# Patient Record
Sex: FEMALE | Race: WHITE | NOT HISPANIC OR LATINO | Employment: OTHER | URBAN - METROPOLITAN AREA
[De-identification: names, ages, dates, MRNs, and addresses within clinical notes are randomized per-mention and may not be internally consistent; named-entity substitution may affect disease eponyms.]

---

## 2017-07-28 ENCOUNTER — HOSPITAL ENCOUNTER (OUTPATIENT)
Dept: LAB | Facility: HOSPITAL | Age: 67
Discharge: HOME | End: 2017-07-28
Attending: PSYCHIATRY & NEUROLOGY | Admitting: PSYCHIATRY & NEUROLOGY

## 2017-07-28 LAB
ALBUMIN SERPL-MCNC: 3.9 G/DL (ref 3.5–4.8)
ALBUMIN/GLOB SERPL: 1.1 {RATIO} (ref 1–1.7)
ALP SERPL-CCNC: 87 IU/L (ref 32–91)
ALT SERPL-CCNC: 49 IU/L (ref 14–54)
AMPICILLIN SUSC ISLT: NORMAL
ANION GAP SERPL CALC-SCNC: 19.1 MMOL/L (ref 10–20)
AST SERPL-CCNC: 32 IU/L (ref 15–41)
AZTREONAM SUSC ISLT: NORMAL
BACTERIA ISLT: NORMAL
BACTERIA SPEC AEROBE CULT: NORMAL
BASOPHILS # BLD AUTO: 0 10*3/UL (ref 0–0.2)
BASOPHILS NFR BLD AUTO: 0 % (ref 0–2)
BILIRUB SERPL-MCNC: 0.6 MG/DL (ref 0.3–1.2)
BILIRUB UR QL STRIP: NEGATIVE MG/DL
BUN SERPL-MCNC: 13 MG/DL (ref 8–20)
BUN/CREAT SERPL: 14.4 (ref 5.4–26.2)
CALCIUM SERPL-MCNC: 9.8 MG/DL (ref 8.9–10.3)
CASTS URNS QL MICRO: ABNORMAL /[LPF]
CEFAZOLIN SUSC ISLT: NORMAL
CEFEPIME SUSC ISLT: NORMAL
CEFTRIAXONE SUSC ISLT: NORMAL
CHLORIDE SERPL-SCNC: 95 MMOL/L (ref 101–111)
CHOLEST SERPL-MCNC: 161 MG/DL
CHOLEST/HDLC SERPL: 3.8 {RATIO}
CIPROFLOXACIN SUSC ISLT: NORMAL
COLONY COUNT: NORMAL
COLOR UR: YELLOW
CONV BACTERIA IN URINE MICRO: ABNORMAL
CONV CLARITY OF URINE: ABNORMAL
CONV CO2: 27 MMOL/L (ref 22–32)
CONV HYALINE CASTS IN URINE MICRO: ABNORMAL /[LPF] (ref 0–5)
CONV LDL CHOLESTEROL DIRECT: 77 MG/DL (ref 0–100)
CONV MICROALBUM.,U,RANDOM: 303 MG/L
CONV PROTEIN IN URINE BY AUTOMATED TEST STRIP: 30 MG/DL
CONV SMALL ROUND CELLS: ABNORMAL /[HPF]
CONV TOTAL PROTEIN: 7.5 G/DL (ref 6.1–7.9)
CONV UROBILINOGEN IN URINE BY AUTOMATED TEST STRIP: 0.2 MG/DL
CREAT 24H UR-MCNC: 46.7 MG/DL
CREAT UR-MCNC: 0.9 MG/DL (ref 0.4–1)
CULTURE INDICATED?: ABNORMAL
DIFFERENTIAL METHOD BLD: (no result)
EOSINOPHIL # BLD AUTO: 0 % (ref 0–3)
EOSINOPHIL # BLD AUTO: 0 10*3/UL (ref 0–0.3)
ERTAPENEM SUSC ISLT: NORMAL
ERYTHROCYTE [DISTWIDTH] IN BLOOD BY AUTOMATED COUNT: 13.6 % (ref 11.5–14.5)
GLOBULIN UR ELPH-MCNC: 3.6 G/DL (ref 2.5–3.8)
GLUCOSE SERPL-MCNC: 127 MG/DL (ref 65–99)
GLUCOSE UR QL: NEGATIVE MG/DL
HCT VFR BLD AUTO: 42.6 % (ref 35–49)
HDLC SERPL-MCNC: 43 MG/DL
HGB BLD-MCNC: 14.6 G/DL (ref 12–15)
HGB UR QL STRIP: ABNORMAL
KETONES UR QL STRIP: NEGATIVE MG/DL
LDLC/HDLC SERPL: 1.8 {RATIO}
LEUKOCYTE ESTERASE UR QL STRIP: ABNORMAL
LEVOFLOXACIN SUSC ISLT: NORMAL
LIPID INTERPRETATION: ABNORMAL
LYMPHOCYTES # BLD AUTO: 1.5 10*3/UL (ref 0.8–4.8)
LYMPHOCYTES NFR BLD AUTO: 18 % (ref 18–42)
Lab: NORMAL
MCH RBC QN AUTO: 33.1 PG (ref 26–32)
MCHC RBC AUTO-ENTMCNC: 34.1 G/DL (ref 32–36)
MCV RBC AUTO: 97.1 FL (ref 80–94)
MEROPENEM SUSC ISLT: NORMAL
MICRO REPORT STATUS: NORMAL
MICROALBUMIN/CREAT UR: 648.8 UG/MG
MONOCYTES # BLD AUTO: 0.7 10*3/UL (ref 0.1–1.3)
MONOCYTES NFR BLD AUTO: 8 % (ref 2–11)
NEUTROPHILS # BLD AUTO: 6.2 10*3/UL (ref 2.3–8.6)
NEUTROPHILS NFR BLD AUTO: 74 % (ref 50–75)
NITRITE UR QL STRIP: NEGATIVE
NITROFURANTOIN SUSC ISLT: NORMAL
NRBC BLD AUTO-RTO: 0 /100{WBCS}
NRBC/RBC NFR BLD MANUAL: 0 10*3/UL
PH UR STRIP.AUTO: 7 [PH] (ref 4.5–8)
PIP+TAZO SUSC ISLT: NORMAL
PLATELET # BLD AUTO: 213 10*3/UL (ref 150–450)
PMV BLD AUTO: 8.5 FL (ref 7.4–10.4)
POTASSIUM SERPL-SCNC: 4.1 MMOL/L (ref 3.6–5.1)
RBC # BLD AUTO: 4.39 10*6/UL (ref 4–5.4)
RBC #/AREA URNS HPF: 3 /[HPF] (ref 0–3)
SODIUM SERPL-SCNC: 137 MMOL/L (ref 136–144)
SP GR UR: 1.01 (ref 1–1.03)
SPECIMEN SOURCE: NORMAL
SPERM URNS QL MICRO: ABNORMAL /[HPF]
SQUAMOUS SPT QL MICRO: 5 /[HPF] (ref 0–5)
SUSC METH SPEC: NORMAL
T3FREE SERPL-MCNC: 3.62 PG/ML (ref 2.39–6.79)
T4 FREE SERPL-MCNC: 1.06 NG/DL (ref 0.58–1.64)
TETRACYCLINE SUSC ISLT: NORMAL
TOBRAMYCIN SUSC ISLT: NORMAL
TRIGL SERPL-MCNC: 226 MG/DL
TRIMETHOPRIM/SULFA: NORMAL
TSH SERPL-ACNC: 1.45 UIU/ML (ref 0.34–5.6)
UNIDENT CRYS URNS QL MICRO: ABNORMAL /[HPF]
VIT B12 SERPL-MCNC: 1411 PG/ML (ref 180–914)
VLDLC SERPL CALC-MCNC: 41 MG/DL
WBC # BLD AUTO: 8.4 10*3/UL (ref 4.5–11.5)
WBC #/AREA URNS HPF: ABNORMAL /[HPF] (ref 0–5)
YEAST SPEC QL WET PREP: ABNORMAL /[HPF]

## 2017-08-09 ENCOUNTER — HOSPITAL ENCOUNTER (OUTPATIENT)
Dept: MAMMOGRAPHY | Facility: HOSPITAL | Age: 67
Discharge: HOME | End: 2017-08-09
Attending: PSYCHIATRY & NEUROLOGY | Admitting: PSYCHIATRY & NEUROLOGY

## 2017-10-06 ENCOUNTER — INPATIENT HOSPITAL (OUTPATIENT)
Dept: URBAN - METROPOLITAN AREA HOSPITAL 84 | Facility: HOSPITAL | Age: 67
End: 2017-10-06
Payer: MEDICAID

## 2017-10-06 DIAGNOSIS — R94.5 ABNORMAL RESULTS OF LIVER FUNCTION STUDIES: ICD-10-CM

## 2017-10-06 PROCEDURE — 99221 1ST HOSP IP/OBS SF/LOW 40: CPT | Performed by: NURSE PRACTITIONER

## 2017-10-30 ENCOUNTER — LAB REQUISITION (OUTPATIENT)
Dept: LAB | Facility: HOSPITAL | Age: 67
End: 2017-10-30

## 2017-10-30 DIAGNOSIS — Z00.00 ROUTINE GENERAL MEDICAL EXAMINATION AT A HEALTH CARE FACILITY: ICD-10-CM

## 2017-10-30 LAB
INR PPP: 2.06 (ref 0.9–1.1)
PROTHROMBIN TIME: 22.5 SECONDS (ref 11.7–14.2)

## 2017-10-30 PROCEDURE — 85610 PROTHROMBIN TIME: CPT

## 2018-05-22 ENCOUNTER — HOSPITAL ENCOUNTER (OUTPATIENT)
Dept: LAB | Facility: HOSPITAL | Age: 68
Discharge: HOME | End: 2018-05-22
Attending: INTERNAL MEDICINE | Admitting: INTERNAL MEDICINE

## 2018-05-22 LAB
ANION GAP SERPL CALC-SCNC: 16.7 MMOL/L (ref 10–20)
BILIRUB UR QL STRIP: NEGATIVE MG/DL
BUN SERPL-MCNC: 18 MG/DL (ref 8–20)
BUN/CREAT SERPL: 22.5 (ref 5.4–26.2)
CALCIUM SERPL-MCNC: 9.5 MG/DL (ref 8.9–10.3)
CASTS URNS QL MICRO: ABNORMAL /[LPF]
CHLORIDE SERPL-SCNC: 98 MMOL/L (ref 101–111)
COLOR UR: YELLOW
CONV BACTERIA IN URINE MICRO: NEGATIVE
CONV CLARITY OF URINE: CLEAR
CONV CO2: 26 MMOL/L (ref 22–32)
CONV HYALINE CASTS IN URINE MICRO: 0 /[LPF] (ref 0–5)
CONV PROTEIN IN URINE BY AUTOMATED TEST STRIP: 30 MG/DL
CONV SMALL ROUND CELLS: ABNORMAL /[HPF]
CONV UROBILINOGEN IN URINE BY AUTOMATED TEST STRIP: 0.2 MG/DL
CREAT 24H UR-MCNC: 18.5 MG/DL
CREAT UR-MCNC: 0.8 MG/DL (ref 0.4–1)
CULTURE INDICATED?: ABNORMAL
GLUCOSE SERPL-MCNC: 101 MG/DL (ref 65–99)
GLUCOSE UR QL: NEGATIVE MG/DL
HGB UR QL STRIP: NEGATIVE
KETONES UR QL STRIP: NEGATIVE MG/DL
LEUKOCYTE ESTERASE UR QL STRIP: ABNORMAL
NITRITE UR QL STRIP: NEGATIVE
PH UR STRIP.AUTO: 7.5 [PH] (ref 4.5–8)
POTASSIUM SERPL-SCNC: 4.7 MMOL/L (ref 3.6–5.1)
PROT UR-MCNC: 31 MG/DL
PROT/CREAT UR: 1.7 MG/MG (ref 0–22)
RBC #/AREA URNS HPF: 0 /[HPF] (ref 0–3)
SODIUM SERPL-SCNC: 136 MMOL/L (ref 136–144)
SP GR UR: 1.01 (ref 1–1.03)
SPERM URNS QL MICRO: ABNORMAL /[HPF]
SQUAMOUS SPT QL MICRO: 1 /[HPF] (ref 0–5)
UNIDENT CRYS URNS QL MICRO: ABNORMAL /[HPF]
WBC #/AREA URNS HPF: 2 /[HPF] (ref 0–5)
YEAST SPEC QL WET PREP: ABNORMAL /[HPF]

## 2019-02-05 ENCOUNTER — LAB REQUISITION (OUTPATIENT)
Dept: LAB | Facility: HOSPITAL | Age: 69
End: 2019-02-05

## 2019-02-05 DIAGNOSIS — Z00.00 ROUTINE GENERAL MEDICAL EXAMINATION AT A HEALTH CARE FACILITY: ICD-10-CM

## 2019-02-05 LAB
INR PPP: 1.6 (ref 0.9–1.1)
PROTHROMBIN TIME: 18.7 SECONDS (ref 11.7–14.2)

## 2019-02-05 PROCEDURE — 85610 PROTHROMBIN TIME: CPT

## 2019-04-05 ENCOUNTER — LAB REQUISITION (OUTPATIENT)
Dept: LAB | Facility: HOSPITAL | Age: 69
End: 2019-04-05

## 2019-04-05 DIAGNOSIS — Z00.00 ROUTINE GENERAL MEDICAL EXAMINATION AT A HEALTH CARE FACILITY: ICD-10-CM

## 2019-04-05 LAB
INR PPP: 1.91 (ref 0.9–1.1)
PROTHROMBIN TIME: 21.5 SECONDS (ref 11.7–14.2)

## 2019-04-05 PROCEDURE — 85610 PROTHROMBIN TIME: CPT

## 2019-07-24 ENCOUNTER — OFFICE VISIT (OUTPATIENT)
Dept: NEUROLOGY | Facility: CLINIC | Age: 69
End: 2019-07-24

## 2019-07-24 VITALS
HEIGHT: 65 IN | WEIGHT: 207 LBS | DIASTOLIC BLOOD PRESSURE: 71 MMHG | SYSTOLIC BLOOD PRESSURE: 183 MMHG | HEART RATE: 78 BPM | BODY MASS INDEX: 34.49 KG/M2

## 2019-07-24 DIAGNOSIS — G81.90 HEMIPLEGIA OF DOMINANT SIDE (HCC): ICD-10-CM

## 2019-07-24 DIAGNOSIS — G40.909 SEIZURE DISORDER (HCC): ICD-10-CM

## 2019-07-24 DIAGNOSIS — G47.33 OBSTRUCTIVE SLEEP APNEA: Primary | ICD-10-CM

## 2019-07-24 PROBLEM — I48.91 ATRIAL FIBRILLATION (HCC): Status: ACTIVE | Noted: 2019-04-16

## 2019-07-24 PROBLEM — Z80.1 FAMILY HISTORY OF LUNG CANCER: Status: ACTIVE | Noted: 2019-07-24

## 2019-07-24 PROBLEM — I63.9 CEREBRAL INFARCTION (HCC): Status: ACTIVE | Noted: 2019-04-16

## 2019-07-24 PROBLEM — I10 HYPERTENSIVE DISORDER: Status: ACTIVE | Noted: 2019-04-16

## 2019-07-24 PROBLEM — E78.5 HYPERLIPIDEMIA: Status: ACTIVE | Noted: 2019-04-16

## 2019-07-24 PROBLEM — G47.30 SLEEP APNEA: Status: ACTIVE | Noted: 2019-07-24

## 2019-07-24 PROCEDURE — 99214 OFFICE O/P EST MOD 30 MIN: CPT | Performed by: PSYCHIATRY & NEUROLOGY

## 2019-07-24 RX ORDER — HYDRALAZINE HYDROCHLORIDE 50 MG/1
50 TABLET, FILM COATED ORAL 3 TIMES DAILY
COMMUNITY

## 2019-07-24 RX ORDER — BACLOFEN 10 MG/1
15 TABLET ORAL 3 TIMES DAILY
COMMUNITY

## 2019-07-24 RX ORDER — WARFARIN SODIUM 4 MG/1
TABLET ORAL
COMMUNITY
End: 2020-11-17

## 2019-07-24 RX ORDER — AMLODIPINE BESYLATE 10 MG/1
10 TABLET ORAL NIGHTLY
COMMUNITY
Start: 2015-05-07

## 2019-07-24 RX ORDER — PHENYTOIN SODIUM 100 MG/1
CAPSULE, EXTENDED RELEASE ORAL
COMMUNITY
Start: 2015-05-07 | End: 2020-11-17

## 2019-07-24 RX ORDER — METOPROLOL TARTRATE 50 MG/1
TABLET, FILM COATED ORAL
COMMUNITY
Start: 2015-05-07 | End: 2020-11-17

## 2019-07-24 RX ORDER — WARFARIN SODIUM 5 MG/1
TABLET ORAL
COMMUNITY
End: 2020-11-17

## 2019-07-24 RX ORDER — POLYETHYLENE GLYCOL 3350
17 GRANULES (GRAM) MISCELLANEOUS 2 TIMES DAILY
COMMUNITY
End: 2020-11-17

## 2019-07-24 RX ORDER — LISINOPRIL 40 MG/1
40 TABLET ORAL DAILY
COMMUNITY
Start: 2016-07-21

## 2019-07-24 RX ORDER — DIGOXIN 250 MCG
250 TABLET ORAL
COMMUNITY

## 2019-07-24 RX ORDER — ATORVASTATIN CALCIUM 10 MG/1
10 TABLET, FILM COATED ORAL NIGHTLY
COMMUNITY
Start: 2016-07-21

## 2019-07-24 RX ORDER — LEVETIRACETAM 500 MG/1
500 TABLET ORAL 2 TIMES DAILY
COMMUNITY
Start: 2015-05-07

## 2019-07-24 NOTE — PROGRESS NOTES
Sleep medicine follow-up visit    Jeremiah Henson   1950  68 y.o. female   DATE OF SERVICE: 7/24/2019     SLEEP TESTING HISTORY:    On NPSG at [facility] , 9/4/2009 patient had Moderate obstructive sleep apnea syndrome with apnea-hypopnea index of 9.5 per sleep hour,   Yearly f/u for bipap compliance, patient doing well with pap therapy, and uses nasal mask and goes through [employer] for supplies.     The compliance data reviewed and the patient is on BiPAP therapy at 12-24-4-8 cm/H2O and compliance data indicates excellent compliance with 99% usage for more than 4 hours with an average usage of 9 hours 35 minutes. AHI down to 4.3    The patient's hypersomnia also resolved with [place] Sleepiness Scale score of 0 with CPAP therapy.  The patient feels great and is benefiting from it and is compliant.     Seizure disorder, no seizures doing well with medication.    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 Patient currently uses a wheel chair currently taking coumadin. Patient currently lives at [facility] and all medication refill go to the nursing home.       Review of Systems   Constitutional: Negative for appetite change and fatigue.   HENT: Negative for congestion, sinus pressure and sinus pain.    Eyes: Negative for pain and itching.   Respiratory: Negative for cough and shortness of breath.    Cardiovascular: Negative for chest pain and palpitations.   Gastrointestinal: Negative for constipation and diarrhea.   Endocrine: Negative for cold intolerance and heat intolerance.   Genitourinary: Negative for difficulty urinating and frequency.   Musculoskeletal: Positive for gait problem. Negative for back pain.   Allergic/Immunologic: Negative for environmental allergies.   Neurological: Negative for dizziness, tremors, seizures, syncope, facial asymmetry, speech difficulty, weakness, light-headedness, numbness and headaches.   Psychiatric/Behavioral:  Negative for agitation and confusion.     I reviewed and addressed ROS entered by MA.      Current Outpatient Medications:   •  amLODIPine (NORVASC) 10 MG tablet, amlodipine 10 mg tablet, Disp: , Rfl:   •  levETIRAcetam (KEPPRA) 500 MG tablet, levetiracetam 500 mg tablet, Disp: , Rfl:   •  lisinopril (PRINIVIL,ZESTRIL) 40 MG tablet, lisinopril 40 mg tablet, Disp: , Rfl:   •  metoprolol tartrate (LOPRESSOR) 50 MG tablet, METOPROLOL TARTRATE TABS, Disp: , Rfl:   •  phenytoin (DILANTIN) 100 MG ER capsule, Dilantin Extended 100 mg capsule, Disp: , Rfl:   •  baclofen (LIORESAL) 10 MG tablet, baclofen 10 mg tablet, Disp: , Rfl:   •  digoxin (DIGOX) 250 MCG tablet, Digox 250 mcg tablet, Disp: , Rfl:   •  hydrALAZINE (APRESOLINE) 50 MG tablet, hydralazine 50 mg tablet, Disp: , Rfl:   •  warfarin (COUMADIN) 4 MG tablet, warfarin 4 mg tablet, Disp: , Rfl:   •  warfarin (COUMADIN) 5 MG tablet, warfarin 5 mg tablet, Disp: , Rfl:   Allergies not on file     PHYSICAL EXAMINATION:  Vitals:    07/24/19 1528   BP: (!) 183/71   Pulse: 78      Body mass index is 34.45 kg/m².       HEENT: Normal.      EXTREMITIES: No edema.     IMPRESSION: Patient with obstructive sleep apnea syndrome with hypersomnia successfully treated with CPAP therapy and is compliant and benefiting from it. Compliance 99%    No seizures on medication , dilantin, and keppra, continue currentl dose    Stoke no further stoke or coumadin      EPWORTH SLEEPINESS SCALE  Sitting and reading  0  WatchingTV  0  Sitting, inactive, in a public place  0  As a passenger in a car for 1 hour w/o a break  0  Lying down to rest in the afternoon  0  Sitting and talking to someone  0  Sitting quietly after a lunch  0  In a car, while stopped for traffic or a light  0  Total 0          This document has been electronically signed by Joseph Seipel, MD on July 24, 2019 4:06 PM

## 2019-07-25 ENCOUNTER — TELEPHONE (OUTPATIENT)
Dept: NEUROLOGY | Facility: CLINIC | Age: 69
End: 2019-07-25

## 2019-07-25 DIAGNOSIS — G47.33 OBSTRUCTIVE SLEEP APNEA: Primary | ICD-10-CM

## 2020-04-04 ENCOUNTER — LAB REQUISITION (OUTPATIENT)
Dept: LAB | Facility: HOSPITAL | Age: 70
End: 2020-04-04

## 2020-04-04 DIAGNOSIS — Z00.00 ROUTINE GENERAL MEDICAL EXAMINATION AT A HEALTH CARE FACILITY: ICD-10-CM

## 2020-04-04 LAB
INR PPP: 2.65 (ref 0.9–1.1)
PROTHROMBIN TIME: 28 SECONDS (ref 11.7–14.2)

## 2020-04-04 PROCEDURE — 85610 PROTHROMBIN TIME: CPT

## 2020-07-29 ENCOUNTER — OFFICE VISIT (OUTPATIENT)
Dept: NEUROLOGY | Facility: CLINIC | Age: 70
End: 2020-07-29

## 2020-07-29 VITALS
BODY MASS INDEX: 34.49 KG/M2 | SYSTOLIC BLOOD PRESSURE: 164 MMHG | HEART RATE: 59 BPM | WEIGHT: 207 LBS | HEIGHT: 65 IN | DIASTOLIC BLOOD PRESSURE: 78 MMHG | TEMPERATURE: 97.7 F

## 2020-07-29 DIAGNOSIS — G47.33 OBSTRUCTIVE SLEEP APNEA SYNDROME: Primary | ICD-10-CM

## 2020-07-29 DIAGNOSIS — G40.909 SEIZURE DISORDER (HCC): ICD-10-CM

## 2020-07-29 DIAGNOSIS — G81.90 HEMIPLEGIA OF DOMINANT SIDE (HCC): ICD-10-CM

## 2020-07-29 PROBLEM — S62.91XA CLOSED FRACTURE OF RIGHT HAND: Status: ACTIVE | Noted: 2020-02-03

## 2020-07-29 PROBLEM — G89.4 CHRONIC PAIN DISORDER: Status: ACTIVE | Noted: 2019-09-21

## 2020-07-29 PROBLEM — E55.9 VITAMIN D DEFICIENCY: Status: ACTIVE | Noted: 2020-03-25

## 2020-07-29 PROBLEM — J30.2 SEASONAL ALLERGIC RHINITIS: Status: ACTIVE | Noted: 2020-02-13

## 2020-07-29 PROBLEM — N39.0 URINARY TRACT INFECTIOUS DISEASE: Status: ACTIVE | Noted: 2020-04-08

## 2020-07-29 PROBLEM — L02.212 ABSCESS OF BACK: Status: ACTIVE | Noted: 2020-04-20

## 2020-07-29 PROCEDURE — 99213 OFFICE O/P EST LOW 20 MIN: CPT | Performed by: PSYCHIATRY & NEUROLOGY

## 2020-07-29 RX ORDER — DIVALPROEX SODIUM 125 MG/1
250 TABLET, DELAYED RELEASE ORAL 3 TIMES DAILY
COMMUNITY

## 2020-07-29 NOTE — PROGRESS NOTES
Sleep medicine follow-up visit    Jeremiah Henson   1950  69 y.o. female   DATE OF SERVICE: 7/29/2020     Yearly f/u for bipap compliance, patient doing well with pap therapy, and uses nasal mask and goes through [employer] for supplies.     SLEEP TESTING HISTORY:    On NPSG at [facility] , 9/4/2009 patient had Moderate obstructive sleep apnea syndrome with apnea-hypopnea index of 9.5 per sleep hour,     The compliance data reviewed and the patient is on PAP therapy at 12-24 / 4-8 cm/H2O and compliance data indicates excellent compliance with 96% usage for more than 4 hours with an average usage of 8 hours 49 minutes. AHI down to 12 but leak about 1.5 hour per day.      The patient's hypersomnia also resolved with [place] Sleepiness Scale score of 2 with CPAP therapy.  The patient feels great and is benefiting from it and is compliant.     Seizure disorder, no seizures doing well with medication.dilantin, and keppra.      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     Patient currently uses a wheel chair on coumadin and lives at [facility]. Would like all medication refills to go to the nursing home.       Review of Systems   Constitutional: Negative for appetite change and fatigue.   HENT: Negative for congestion, sinus pressure and sinus pain.    Eyes: Negative for pain and itching.   Respiratory: Positive for apnea. Negative for cough and shortness of breath.    Cardiovascular: Negative for chest pain and palpitations.   Gastrointestinal: Negative for constipation and diarrhea.   Endocrine: Positive for cold intolerance. Negative for heat intolerance.   Genitourinary: Negative for difficulty urinating and frequency.   Musculoskeletal: Positive for back pain and gait problem.   Allergic/Immunologic: Negative for environmental allergies.   Neurological: Negative for dizziness, tremors, seizures, syncope, facial asymmetry, speech difficulty, weakness,  light-headedness, numbness and headaches.   Psychiatric/Behavioral: Negative for agitation and confusion.     I reviewed and addressed ROS entered by MA.    History of Present Illness, BRIE, Seizure disorder, Hemiplegia and Hemiparesis, late effect of stroke right dominant side.     The following portions of the patient's history were reviewed and updated as appropriate: allergies, current medications, past family history, past medical history, past social history, past surgical history and problem list.      Family History   Problem Relation Age of Onset   • Lung cancer Father    • Heart attack Father        Past Medical History:   Diagnosis Date   • CVA (cerebral vascular accident) (CMS/HCC)    • Hypertension    • Seizure (CMS/HCC)    • Sleep apnea        Social History     Socioeconomic History   • Marital status:      Spouse name: Not on file   • Number of children: Not on file   • Years of education: Not on file   • Highest education level: Not on file   Tobacco Use   • Smoking status: Former Smoker     Years: 20.00   • Smokeless tobacco: Never Used   Substance and Sexual Activity   • Alcohol use: No     Frequency: Never   • Drug use: No   • Sexual activity: Defer         Current Outpatient Medications:   •  amLODIPine (NORVASC) 10 MG tablet, amlodipine 10 mg tablet, Disp: , Rfl:   •  atorvastatin (LIPITOR) 10 MG tablet, ATORVASTATIN CALCIUM 40 MG ORAL TABS (ATORVASTATIN CALCIUM), Disp: , Rfl:   •  baclofen (LIORESAL) 10 MG tablet, baclofen 10 mg tablet, Disp: , Rfl:   •  calcium carbonate-vitamin d (CALCIUM 600+D HIGH POTENCY) 600-400 MG-UNIT per tablet, CALCIUM 400, Disp: , Rfl:   •  digoxin (DIGOX) 250 MCG tablet, Digox 250 mcg tablet, Disp: , Rfl:   •  hydrALAZINE (APRESOLINE) 50 MG tablet, hydralazine 50 mg tablet, Disp: , Rfl:   •  levETIRAcetam (KEPPRA) 500 MG tablet, levetiracetam 500 mg tablet, Disp: , Rfl:   •  lisinopril (PRINIVIL,ZESTRIL) 40 MG tablet, lisinopril 40 mg tablet, Disp: , Rfl:   •   metoprolol tartrate (LOPRESSOR) 50 MG tablet, METOPROLOL TARTRATE TABS, Disp: , Rfl:   •  phenytoin (DILANTIN) 100 MG ER capsule, Dilantin Extended 100 mg capsule, Disp: , Rfl:   •  Polyethylene Glycol 3350 granules, polyethylene glycol 3350 17 gram/dose oral powder, Disp: , Rfl:   •  warfarin (COUMADIN) 4 MG tablet, warfarin 4 mg tablet, Disp: , Rfl:   •  warfarin (COUMADIN) 5 MG tablet, warfarin 5 mg tablet, Disp: , Rfl:     Allergies not on file     PHYSICAL EXAMINATION:    There were no vitals filed for this visit.   There is no height or weight on file to calculate BMI.       HEENT: Normal.      EXTREMITIES: No edema.     IMPRESSION:    Patient with obstructive sleep apnea syndrome with hypersomnia successfully treated with   PAP therapy and is compliant and benefiting from it.       RECOMMENDATIONS:   1. Continue present PAP.   2. Follow up 1 year.   3 continue Keppra and dilantin    EPWORTH SLEEPINESS SCALE  Sitting and reading  0  WatchingTV  2  Sitting, inactive, in a public place  0  As a passenger in a car for 1 hour w/o a break  0  Lying down to rest in the afternoon  0  Sitting and talking to someone  0  Sitting quietly after a lunch  0  In a car, while stopped for traffic or a light  0  Total 2        This document has been electronically signed by Joseph Seipel, MD on July 29, 2020 14:52

## 2020-07-31 ENCOUNTER — TELEPHONE (OUTPATIENT)
Dept: NEUROLOGY | Facility: CLINIC | Age: 70
End: 2020-07-31

## 2020-07-31 DIAGNOSIS — G47.33 OBSTRUCTIVE SLEEP APNEA SYNDROME: Primary | ICD-10-CM

## 2020-07-31 NOTE — TELEPHONE ENCOUNTER
----- Message from Joseph F Seipel, MD sent at 7/29/2020  4:16 PM EDT -----   nasal mask and goes through [facility]

## 2020-08-08 ENCOUNTER — LAB REQUISITION (OUTPATIENT)
Dept: LAB | Facility: HOSPITAL | Age: 70
End: 2020-08-08

## 2020-08-08 DIAGNOSIS — Z00.00 ROUTINE GENERAL MEDICAL EXAMINATION AT A HEALTH CARE FACILITY: ICD-10-CM

## 2020-08-08 LAB
BACTERIA UR QL AUTO: ABNORMAL /HPF
BILIRUB UR QL STRIP: NEGATIVE
CLARITY UR: CLEAR
COLOR UR: YELLOW
GLUCOSE UR STRIP-MCNC: NEGATIVE MG/DL
HGB UR QL STRIP.AUTO: NEGATIVE
HYALINE CASTS UR QL AUTO: ABNORMAL /LPF
KETONES UR QL STRIP: NEGATIVE
LEUKOCYTE ESTERASE UR QL STRIP.AUTO: NEGATIVE
NITRITE UR QL STRIP: NEGATIVE
PH UR STRIP.AUTO: 6 [PH] (ref 5–8)
PROT UR QL STRIP: ABNORMAL
RBC # UR: ABNORMAL /HPF
REF LAB TEST METHOD: ABNORMAL
SP GR UR STRIP: 1.01 (ref 1–1.03)
SQUAMOUS #/AREA URNS HPF: ABNORMAL /HPF
UROBILINOGEN UR QL STRIP: ABNORMAL
WBC UR QL AUTO: ABNORMAL /HPF
YEAST URNS QL MICRO: ABNORMAL /HPF

## 2020-08-08 PROCEDURE — 81001 URINALYSIS AUTO W/SCOPE: CPT

## 2020-08-21 ENCOUNTER — TRANSCRIBE ORDERS (OUTPATIENT)
Dept: ADMINISTRATIVE | Facility: HOSPITAL | Age: 70
End: 2020-08-21

## 2020-08-21 DIAGNOSIS — R41.82 ALTERED MENTAL STATUS, UNSPECIFIED ALTERED MENTAL STATUS TYPE: Primary | ICD-10-CM

## 2020-08-26 ENCOUNTER — HOSPITAL ENCOUNTER (OUTPATIENT)
Dept: CT IMAGING | Facility: HOSPITAL | Age: 70
Discharge: HOME | End: 2020-08-26
Admitting: NURSE PRACTITIONER

## 2020-08-26 DIAGNOSIS — R41.82 ALTERED MENTAL STATUS, UNSPECIFIED ALTERED MENTAL STATUS TYPE: ICD-10-CM

## 2020-08-26 PROCEDURE — 70450 CT HEAD/BRAIN W/O DYE: CPT

## 2020-11-17 ENCOUNTER — APPOINTMENT (OUTPATIENT)
Dept: CT IMAGING | Facility: HOSPITAL | Age: 70
End: 2020-11-17

## 2020-11-17 ENCOUNTER — HOSPITAL ENCOUNTER (OUTPATIENT)
Facility: HOSPITAL | Age: 70
Discharge: HOME | End: 2020-11-19
Attending: EMERGENCY MEDICINE | Admitting: INTERNAL MEDICINE

## 2020-11-17 DIAGNOSIS — R10.9 ABDOMINAL PAIN, UNSPECIFIED ABDOMINAL LOCATION: Primary | ICD-10-CM

## 2020-11-17 DIAGNOSIS — R10.32 LEFT LOWER QUADRANT ABDOMINAL PAIN: ICD-10-CM

## 2020-11-17 DIAGNOSIS — K59.00 CONSTIPATION, UNSPECIFIED CONSTIPATION TYPE: ICD-10-CM

## 2020-11-17 LAB
ALBUMIN SERPL-MCNC: 3.9 G/DL (ref 3.5–5.2)
ALBUMIN/GLOB SERPL: 1.3 G/DL
ALP SERPL-CCNC: 103 U/L (ref 39–117)
ALT SERPL W P-5'-P-CCNC: 16 U/L (ref 1–33)
ANION GAP SERPL CALCULATED.3IONS-SCNC: 10 MMOL/L (ref 5–15)
APTT PPP: 29.2 SECONDS (ref 24–31)
AST SERPL-CCNC: 17 U/L (ref 1–32)
BASOPHILS # BLD AUTO: 0 10*3/MM3 (ref 0–0.2)
BASOPHILS NFR BLD AUTO: 0.1 % (ref 0–1.5)
BILIRUB SERPL-MCNC: 0.4 MG/DL (ref 0–1.2)
BUN SERPL-MCNC: 14 MG/DL (ref 8–23)
BUN/CREAT SERPL: 21.2 (ref 7–25)
CALCIUM SPEC-SCNC: 8.8 MG/DL (ref 8.6–10.5)
CHLORIDE SERPL-SCNC: 100 MMOL/L (ref 98–107)
CO2 SERPL-SCNC: 28 MMOL/L (ref 22–29)
CREAT SERPL-MCNC: 0.66 MG/DL (ref 0.57–1)
DEPRECATED RDW RBC AUTO: 45.5 FL (ref 37–54)
EOSINOPHIL # BLD AUTO: 0 10*3/MM3 (ref 0–0.4)
EOSINOPHIL NFR BLD AUTO: 0.6 % (ref 0.3–6.2)
ERYTHROCYTE [DISTWIDTH] IN BLOOD BY AUTOMATED COUNT: 13.7 % (ref 12.3–15.4)
GFR SERPL CREATININE-BSD FRML MDRD: 88 ML/MIN/1.73
GLOBULIN UR ELPH-MCNC: 2.9 GM/DL
GLUCOSE SERPL-MCNC: 137 MG/DL (ref 65–99)
HCT VFR BLD AUTO: 58.6 % (ref 34–46.6)
HGB BLD-MCNC: 19.6 G/DL (ref 12–15.9)
HOLD SPECIMEN: NORMAL
HOLD SPECIMEN: NORMAL
INR PPP: 1.13 (ref 2–3)
LIPASE SERPL-CCNC: 57 U/L (ref 13–60)
LYMPHOCYTES # BLD AUTO: 0.5 10*3/MM3 (ref 0.7–3.1)
LYMPHOCYTES NFR BLD AUTO: 11.4 % (ref 19.6–45.3)
MCH RBC QN AUTO: 32.4 PG (ref 26.6–33)
MCHC RBC AUTO-ENTMCNC: 33.5 G/DL (ref 31.5–35.7)
MCV RBC AUTO: 96.5 FL (ref 79–97)
MONOCYTES # BLD AUTO: 0.4 10*3/MM3 (ref 0.1–0.9)
MONOCYTES NFR BLD AUTO: 9.1 % (ref 5–12)
NEUTROPHILS NFR BLD AUTO: 3.7 10*3/MM3 (ref 1.7–7)
NEUTROPHILS NFR BLD AUTO: 78.8 % (ref 42.7–76)
NRBC BLD AUTO-RTO: 0 /100 WBC (ref 0–0.2)
PLATELET # BLD AUTO: 91 10*3/MM3 (ref 140–450)
PMV BLD AUTO: 8.3 FL (ref 6–12)
POTASSIUM SERPL-SCNC: 3.9 MMOL/L (ref 3.5–5.2)
PROT SERPL-MCNC: 6.8 G/DL (ref 6–8.5)
PROTHROMBIN TIME: 12.4 SECONDS (ref 19.4–28.5)
RBC # BLD AUTO: 6.07 10*6/MM3 (ref 3.77–5.28)
SODIUM SERPL-SCNC: 138 MMOL/L (ref 136–145)
WBC # BLD AUTO: 4.7 10*3/MM3 (ref 3.4–10.8)

## 2020-11-17 PROCEDURE — 85730 THROMBOPLASTIN TIME PARTIAL: CPT | Performed by: EMERGENCY MEDICINE

## 2020-11-17 PROCEDURE — 99284 EMERGENCY DEPT VISIT MOD MDM: CPT

## 2020-11-17 PROCEDURE — G0378 HOSPITAL OBSERVATION PER HR: HCPCS

## 2020-11-17 PROCEDURE — 83690 ASSAY OF LIPASE: CPT | Performed by: EMERGENCY MEDICINE

## 2020-11-17 PROCEDURE — 80053 COMPREHEN METABOLIC PANEL: CPT | Performed by: EMERGENCY MEDICINE

## 2020-11-17 PROCEDURE — 74177 CT ABD & PELVIS W/CONTRAST: CPT

## 2020-11-17 PROCEDURE — 85025 COMPLETE CBC W/AUTO DIFF WBC: CPT | Performed by: EMERGENCY MEDICINE

## 2020-11-17 PROCEDURE — 0 IOPAMIDOL PER 1 ML: Performed by: EMERGENCY MEDICINE

## 2020-11-17 PROCEDURE — 99219 PR INITIAL OBSERVATION CARE/DAY 50 MINUTES: CPT | Performed by: PHYSICIAN ASSISTANT

## 2020-11-17 PROCEDURE — 85610 PROTHROMBIN TIME: CPT | Performed by: EMERGENCY MEDICINE

## 2020-11-17 RX ORDER — RISPERIDONE 0.25 MG/1
0.25 TABLET ORAL 2 TIMES DAILY
COMMUNITY
End: 2022-09-08

## 2020-11-17 RX ORDER — BUSPIRONE HYDROCHLORIDE 5 MG/1
5 TABLET ORAL 2 TIMES DAILY
COMMUNITY

## 2020-11-17 RX ORDER — WARFARIN SODIUM 3 MG/1
3 TABLET ORAL
COMMUNITY
End: 2021-08-02 | Stop reason: ALTCHOICE

## 2020-11-17 RX ORDER — SODIUM CHLORIDE 0.9 % (FLUSH) 0.9 %
10 SYRINGE (ML) INJECTION AS NEEDED
Status: DISCONTINUED | OUTPATIENT
Start: 2020-11-17 | End: 2020-11-19 | Stop reason: HOSPADM

## 2020-11-17 RX ORDER — DOCUSATE SODIUM 100 MG/1
200 CAPSULE, LIQUID FILLED ORAL 2 TIMES DAILY
COMMUNITY

## 2020-11-17 RX ORDER — PHENYTOIN SODIUM 200 MG/1
200 CAPSULE, EXTENDED RELEASE ORAL 2 TIMES DAILY
COMMUNITY

## 2020-11-17 RX ORDER — ACETAMINOPHEN 325 MG/1
650 TABLET ORAL EVERY 8 HOURS PRN
COMMUNITY

## 2020-11-17 RX ORDER — HYDRALAZINE HYDROCHLORIDE 25 MG/1
25 TABLET, FILM COATED ORAL DAILY
COMMUNITY
End: 2022-09-09 | Stop reason: SDUPTHER

## 2020-11-17 RX ORDER — CHLORAL HYDRATE 500 MG
1 CAPSULE ORAL 2 TIMES DAILY
COMMUNITY

## 2020-11-17 RX ORDER — CIPROFLOXACIN 250 MG/1
250 TABLET, FILM COATED ORAL 2 TIMES DAILY
COMMUNITY
Start: 2020-11-16 | End: 2020-11-23

## 2020-11-17 RX ORDER — MELATONIN 200 MCG
3 TABLET ORAL NIGHTLY
COMMUNITY
End: 2022-09-09

## 2020-11-17 RX ORDER — METOPROLOL TARTRATE 100 MG/1
100 TABLET ORAL DAILY
COMMUNITY

## 2020-11-17 RX ORDER — POLYETHYLENE GLYCOL 3350 17 G/17G
17 POWDER, FOR SOLUTION ORAL DAILY
COMMUNITY

## 2020-11-17 RX ADMIN — IOPAMIDOL 100 ML: 755 INJECTION, SOLUTION INTRAVENOUS at 21:00

## 2020-11-18 ENCOUNTER — ON CAMPUS - OUTPATIENT (OUTPATIENT)
Dept: URBAN - METROPOLITAN AREA HOSPITAL 85 | Facility: HOSPITAL | Age: 70
End: 2020-11-18

## 2020-11-18 DIAGNOSIS — G47.33 OBSTRUCTIVE SLEEP APNEA (ADULT) (PEDIATRIC): ICD-10-CM

## 2020-11-18 DIAGNOSIS — Z86.79 PERSONAL HISTORY OF OTHER DISEASES OF THE CIRCULATORY SYSTEM: ICD-10-CM

## 2020-11-18 DIAGNOSIS — R93.3 ABNORMAL FINDINGS ON DIAGNOSTIC IMAGING OF OTHER PARTS OF DI: ICD-10-CM

## 2020-11-18 DIAGNOSIS — R10.84 GENERALIZED ABDOMINAL PAIN: ICD-10-CM

## 2020-11-18 DIAGNOSIS — K59.00 CONSTIPATION, UNSPECIFIED: ICD-10-CM

## 2020-11-18 DIAGNOSIS — Z79.01 LONG TERM (CURRENT) USE OF ANTICOAGULANTS: ICD-10-CM

## 2020-11-18 DIAGNOSIS — I69.30 UNSPECIFIED SEQUELAE OF CEREBRAL INFARCTION: ICD-10-CM

## 2020-11-18 PROBLEM — F39 MOOD DISORDER (HCC): Status: ACTIVE | Noted: 2020-08-06

## 2020-11-18 PROBLEM — S43.006A DISLOCATION OF SHOULDER JOINT: Status: ACTIVE | Noted: 2020-08-10

## 2020-11-18 PROBLEM — F41.9 ANXIETY: Status: ACTIVE | Noted: 2020-08-06

## 2020-11-18 PROBLEM — K56.609 MECHANICAL ILEUS (HCC): Status: ACTIVE | Noted: 2020-11-16

## 2020-11-18 PROBLEM — R10.32 LEFT LOWER QUADRANT ABDOMINAL PAIN: Status: ACTIVE | Noted: 2020-11-17

## 2020-11-18 PROBLEM — F32.A DEPRESSIVE DISORDER: Status: ACTIVE | Noted: 2020-08-12

## 2020-11-18 LAB
ANION GAP SERPL CALCULATED.3IONS-SCNC: 11 MMOL/L (ref 5–15)
BACTERIA UR QL AUTO: ABNORMAL /HPF
BASOPHILS # BLD AUTO: 0 10*3/MM3 (ref 0–0.2)
BASOPHILS NFR BLD AUTO: 0.4 % (ref 0–1.5)
BILIRUB UR QL STRIP: NEGATIVE
BUN SERPL-MCNC: 15 MG/DL (ref 8–23)
BUN/CREAT SERPL: 19.7 (ref 7–25)
CALCIUM SPEC-SCNC: 8.8 MG/DL (ref 8.6–10.5)
CHLORIDE SERPL-SCNC: 100 MMOL/L (ref 98–107)
CLARITY UR: ABNORMAL
CO2 SERPL-SCNC: 26 MMOL/L (ref 22–29)
COLOR UR: YELLOW
CREAT SERPL-MCNC: 0.76 MG/DL (ref 0.57–1)
DEPRECATED RDW RBC AUTO: 45.5 FL (ref 37–54)
DIGOXIN SERPL-MCNC: 0.4 NG/ML (ref 0.6–1.2)
EOSINOPHIL # BLD AUTO: 0 10*3/MM3 (ref 0–0.4)
EOSINOPHIL NFR BLD AUTO: 0.4 % (ref 0.3–6.2)
ERYTHROCYTE [DISTWIDTH] IN BLOOD BY AUTOMATED COUNT: 13.7 % (ref 12.3–15.4)
GFR SERPL CREATININE-BSD FRML MDRD: 75 ML/MIN/1.73
GLUCOSE SERPL-MCNC: 117 MG/DL (ref 65–99)
GLUCOSE UR STRIP-MCNC: NEGATIVE MG/DL
HCT VFR BLD AUTO: 39.3 % (ref 34–46.6)
HGB BLD-MCNC: 13.4 G/DL (ref 12–15.9)
HGB UR QL STRIP.AUTO: NEGATIVE
HYALINE CASTS UR QL AUTO: ABNORMAL /LPF
INR PPP: 1.11 (ref 2–3)
KETONES UR QL STRIP: NEGATIVE
LEUKOCYTE ESTERASE UR QL STRIP.AUTO: ABNORMAL
LYMPHOCYTES # BLD AUTO: 1 10*3/MM3 (ref 0.7–3.1)
LYMPHOCYTES NFR BLD AUTO: 10 % (ref 19.6–45.3)
MAGNESIUM SERPL-MCNC: 1.7 MG/DL (ref 1.6–2.4)
MCH RBC QN AUTO: 32.8 PG (ref 26.6–33)
MCHC RBC AUTO-ENTMCNC: 34.1 G/DL (ref 31.5–35.7)
MCV RBC AUTO: 96.2 FL (ref 79–97)
MONOCYTES # BLD AUTO: 1.2 10*3/MM3 (ref 0.1–0.9)
MONOCYTES NFR BLD AUTO: 12.6 % (ref 5–12)
NEUTROPHILS NFR BLD AUTO: 7.4 10*3/MM3 (ref 1.7–7)
NEUTROPHILS NFR BLD AUTO: 76.6 % (ref 42.7–76)
NITRITE UR QL STRIP: POSITIVE
NRBC BLD AUTO-RTO: 0.3 /100 WBC (ref 0–0.2)
PH UR STRIP.AUTO: 6.5 [PH] (ref 5–8)
PHOSPHATE SERPL-MCNC: 3.7 MG/DL (ref 2.5–4.5)
PLATELET # BLD AUTO: 236 10*3/MM3 (ref 140–450)
PMV BLD AUTO: 8.5 FL (ref 6–12)
POTASSIUM SERPL-SCNC: 4 MMOL/L (ref 3.5–5.2)
PROT UR QL STRIP: ABNORMAL
PROTHROMBIN TIME: 12.2 SECONDS (ref 19.4–28.5)
RBC # BLD AUTO: 4.09 10*6/MM3 (ref 3.77–5.28)
RBC # UR: ABNORMAL /HPF
REF LAB TEST METHOD: ABNORMAL
SARS-COV-2 RNA PNL SPEC NAA+PROBE: NOT DETECTED
SODIUM SERPL-SCNC: 137 MMOL/L (ref 136–145)
SP GR UR STRIP: 1.03 (ref 1–1.03)
SQUAMOUS #/AREA URNS HPF: ABNORMAL /HPF
TSH SERPL DL<=0.05 MIU/L-ACNC: 1.07 UIU/ML (ref 0.27–4.2)
UROBILINOGEN UR QL STRIP: ABNORMAL
WBC # BLD AUTO: 9.6 10*3/MM3 (ref 3.4–10.8)
WBC UR QL AUTO: ABNORMAL /HPF

## 2020-11-18 PROCEDURE — 97161 PT EVAL LOW COMPLEX 20 MIN: CPT

## 2020-11-18 PROCEDURE — 83735 ASSAY OF MAGNESIUM: CPT | Performed by: PHYSICIAN ASSISTANT

## 2020-11-18 PROCEDURE — 80048 BASIC METABOLIC PNL TOTAL CA: CPT | Performed by: PHYSICIAN ASSISTANT

## 2020-11-18 PROCEDURE — 84443 ASSAY THYROID STIM HORMONE: CPT | Performed by: PHYSICIAN ASSISTANT

## 2020-11-18 PROCEDURE — 99225 PR SBSQ OBSERVATION CARE/DAY 25 MINUTES: CPT | Performed by: HOSPITALIST

## 2020-11-18 PROCEDURE — 99213 OFFICE O/P EST LOW 20 MIN: CPT | Performed by: NURSE PRACTITIONER

## 2020-11-18 PROCEDURE — 85610 PROTHROMBIN TIME: CPT | Performed by: PHYSICIAN ASSISTANT

## 2020-11-18 PROCEDURE — 96361 HYDRATE IV INFUSION ADD-ON: CPT

## 2020-11-18 PROCEDURE — 96360 HYDRATION IV INFUSION INIT: CPT

## 2020-11-18 PROCEDURE — 87635 SARS-COV-2 COVID-19 AMP PRB: CPT | Performed by: NURSE PRACTITIONER

## 2020-11-18 PROCEDURE — G0378 HOSPITAL OBSERVATION PER HR: HCPCS

## 2020-11-18 PROCEDURE — 80162 ASSAY OF DIGOXIN TOTAL: CPT | Performed by: PHYSICIAN ASSISTANT

## 2020-11-18 PROCEDURE — 81001 URINALYSIS AUTO W/SCOPE: CPT | Performed by: HOSPITALIST

## 2020-11-18 PROCEDURE — 85025 COMPLETE CBC W/AUTO DIFF WBC: CPT | Performed by: PHYSICIAN ASSISTANT

## 2020-11-18 PROCEDURE — 84100 ASSAY OF PHOSPHORUS: CPT | Performed by: PHYSICIAN ASSISTANT

## 2020-11-18 RX ORDER — SODIUM CHLORIDE 9 MG/ML
100 INJECTION, SOLUTION INTRAVENOUS ONCE
Status: COMPLETED | OUTPATIENT
Start: 2020-11-18 | End: 2020-11-18

## 2020-11-18 RX ORDER — ALUMINA, MAGNESIA, AND SIMETHICONE 2400; 2400; 240 MG/30ML; MG/30ML; MG/30ML
15 SUSPENSION ORAL EVERY 6 HOURS PRN
Status: DISCONTINUED | OUTPATIENT
Start: 2020-11-18 | End: 2020-11-19 | Stop reason: HOSPADM

## 2020-11-18 RX ORDER — POLYETHYLENE GLYCOL 3350 17 G/17G
17 POWDER, FOR SOLUTION ORAL 2 TIMES DAILY
Status: DISCONTINUED | OUTPATIENT
Start: 2020-11-18 | End: 2020-11-18

## 2020-11-18 RX ORDER — SODIUM CHLORIDE 0.9 % (FLUSH) 0.9 %
10 SYRINGE (ML) INJECTION EVERY 12 HOURS SCHEDULED
Status: DISCONTINUED | OUTPATIENT
Start: 2020-11-18 | End: 2020-11-19 | Stop reason: HOSPADM

## 2020-11-18 RX ORDER — CHOLECALCIFEROL (VITAMIN D3) 125 MCG
5 CAPSULE ORAL NIGHTLY PRN
Status: DISCONTINUED | OUTPATIENT
Start: 2020-11-18 | End: 2020-11-19 | Stop reason: HOSPADM

## 2020-11-18 RX ORDER — ACETAMINOPHEN 325 MG/1
650 TABLET ORAL EVERY 4 HOURS PRN
Status: DISCONTINUED | OUTPATIENT
Start: 2020-11-18 | End: 2020-11-19 | Stop reason: HOSPADM

## 2020-11-18 RX ORDER — BUSPIRONE HYDROCHLORIDE 15 MG/1
7.5 TABLET ORAL 3 TIMES DAILY
Status: DISCONTINUED | OUTPATIENT
Start: 2020-11-18 | End: 2020-11-19 | Stop reason: HOSPADM

## 2020-11-18 RX ORDER — SODIUM CHLORIDE 0.9 % (FLUSH) 0.9 %
10 SYRINGE (ML) INJECTION AS NEEDED
Status: DISCONTINUED | OUTPATIENT
Start: 2020-11-18 | End: 2020-11-19 | Stop reason: HOSPADM

## 2020-11-18 RX ORDER — METOPROLOL TARTRATE 50 MG/1
200 TABLET, FILM COATED ORAL EVERY 12 HOURS SCHEDULED
Status: DISCONTINUED | OUTPATIENT
Start: 2020-11-18 | End: 2020-11-19 | Stop reason: HOSPADM

## 2020-11-18 RX ORDER — LISINOPRIL 20 MG/1
40 TABLET ORAL DAILY
Status: DISCONTINUED | OUTPATIENT
Start: 2020-11-18 | End: 2020-11-19 | Stop reason: HOSPADM

## 2020-11-18 RX ORDER — LEVETIRACETAM 500 MG/1
500 TABLET ORAL 2 TIMES DAILY
Status: DISCONTINUED | OUTPATIENT
Start: 2020-11-18 | End: 2020-11-19 | Stop reason: HOSPADM

## 2020-11-18 RX ORDER — WARFARIN SODIUM 3 MG/1
3 TABLET ORAL
Status: DISCONTINUED | OUTPATIENT
Start: 2020-11-18 | End: 2020-11-18

## 2020-11-18 RX ORDER — RISPERIDONE 0.25 MG/1
0.25 TABLET ORAL EVERY 12 HOURS SCHEDULED
Status: DISCONTINUED | OUTPATIENT
Start: 2020-11-18 | End: 2020-11-19 | Stop reason: HOSPADM

## 2020-11-18 RX ORDER — FUROSEMIDE 20 MG/1
20 TABLET ORAL ONCE
Status: DISCONTINUED | OUTPATIENT
Start: 2020-11-18 | End: 2020-11-18

## 2020-11-18 RX ORDER — SODIUM CHLORIDE 0.9 % (FLUSH) 0.9 %
10 SYRINGE (ML) INJECTION AS NEEDED
Status: DISCONTINUED | OUTPATIENT
Start: 2020-11-18 | End: 2020-11-19

## 2020-11-18 RX ORDER — HYDRALAZINE HYDROCHLORIDE 25 MG/1
75 TABLET, FILM COATED ORAL 3 TIMES DAILY
Status: DISCONTINUED | OUTPATIENT
Start: 2020-11-18 | End: 2020-11-19 | Stop reason: HOSPADM

## 2020-11-18 RX ORDER — BISACODYL 10 MG
10 SUPPOSITORY, RECTAL RECTAL DAILY PRN
Status: DISCONTINUED | OUTPATIENT
Start: 2020-11-18 | End: 2020-11-19 | Stop reason: HOSPADM

## 2020-11-18 RX ORDER — ONDANSETRON 4 MG/1
4 TABLET, FILM COATED ORAL EVERY 6 HOURS PRN
Status: DISCONTINUED | OUTPATIENT
Start: 2020-11-18 | End: 2020-11-19 | Stop reason: HOSPADM

## 2020-11-18 RX ORDER — HYDRALAZINE HYDROCHLORIDE 25 MG/1
50 TABLET, FILM COATED ORAL 3 TIMES DAILY
Status: DISCONTINUED | OUTPATIENT
Start: 2020-11-18 | End: 2020-11-18

## 2020-11-18 RX ORDER — WARFARIN SODIUM 4 MG/1
4 TABLET ORAL
Status: COMPLETED | OUTPATIENT
Start: 2020-11-18 | End: 2020-11-18

## 2020-11-18 RX ORDER — ACETAMINOPHEN 160 MG/5ML
650 SOLUTION ORAL EVERY 4 HOURS PRN
Status: DISCONTINUED | OUTPATIENT
Start: 2020-11-18 | End: 2020-11-19 | Stop reason: HOSPADM

## 2020-11-18 RX ORDER — AMLODIPINE BESYLATE 5 MG/1
10 TABLET ORAL NIGHTLY
Status: DISCONTINUED | OUTPATIENT
Start: 2020-11-18 | End: 2020-11-19 | Stop reason: HOSPADM

## 2020-11-18 RX ORDER — ONDANSETRON 2 MG/ML
4 INJECTION INTRAMUSCULAR; INTRAVENOUS EVERY 6 HOURS PRN
Status: DISCONTINUED | OUTPATIENT
Start: 2020-11-18 | End: 2020-11-19 | Stop reason: HOSPADM

## 2020-11-18 RX ORDER — SORBITOL SOLUTION 70 %
50 SOLUTION, ORAL MISCELLANEOUS ONCE
Status: COMPLETED | OUTPATIENT
Start: 2020-11-18 | End: 2020-11-18

## 2020-11-18 RX ORDER — PEG-3350, SODIUM SULFATE, SODIUM CHLORIDE, POTASSIUM CHLORIDE, SODIUM ASCORBATE AND ASCORBIC ACID 7.5-2.691G
1000 KIT ORAL EVERY 12 HOURS
Status: COMPLETED | OUTPATIENT
Start: 2020-11-18 | End: 2020-11-19

## 2020-11-18 RX ORDER — DIVALPROEX SODIUM 250 MG/1
250 TABLET, DELAYED RELEASE ORAL 2 TIMES DAILY
Status: DISCONTINUED | OUTPATIENT
Start: 2020-11-18 | End: 2020-11-19 | Stop reason: HOSPADM

## 2020-11-18 RX ORDER — DIGOXIN 250 MCG
250 TABLET ORAL
Status: DISCONTINUED | OUTPATIENT
Start: 2020-11-18 | End: 2020-11-19 | Stop reason: HOSPADM

## 2020-11-18 RX ORDER — PHENYTOIN SODIUM 100 MG/1
300 CAPSULE, EXTENDED RELEASE ORAL 2 TIMES DAILY
Status: DISCONTINUED | OUTPATIENT
Start: 2020-11-18 | End: 2020-11-19 | Stop reason: HOSPADM

## 2020-11-18 RX ORDER — DOCUSATE SODIUM 100 MG/1
100 CAPSULE, LIQUID FILLED ORAL 2 TIMES DAILY
Status: DISCONTINUED | OUTPATIENT
Start: 2020-11-18 | End: 2020-11-19 | Stop reason: HOSPADM

## 2020-11-18 RX ORDER — ACETAMINOPHEN 650 MG/1
650 SUPPOSITORY RECTAL EVERY 4 HOURS PRN
Status: DISCONTINUED | OUTPATIENT
Start: 2020-11-18 | End: 2020-11-19 | Stop reason: HOSPADM

## 2020-11-18 RX ORDER — ATORVASTATIN CALCIUM 10 MG/1
10 TABLET, FILM COATED ORAL NIGHTLY
Status: DISCONTINUED | OUTPATIENT
Start: 2020-11-18 | End: 2020-11-19 | Stop reason: HOSPADM

## 2020-11-18 RX ORDER — WARFARIN SODIUM 3 MG/1
3 TABLET ORAL
Status: DISCONTINUED | OUTPATIENT
Start: 2020-11-18 | End: 2020-11-19 | Stop reason: HOSPADM

## 2020-11-18 RX ORDER — SODIUM CHLORIDE 0.9 % (FLUSH) 0.9 %
3 SYRINGE (ML) INJECTION EVERY 12 HOURS SCHEDULED
Status: DISCONTINUED | OUTPATIENT
Start: 2020-11-18 | End: 2020-11-19

## 2020-11-18 RX ADMIN — ATORVASTATIN CALCIUM 10 MG: 10 TABLET, FILM COATED ORAL at 20:55

## 2020-11-18 RX ADMIN — DOCUSATE SODIUM 100 MG: 100 CAPSULE, LIQUID FILLED ORAL at 08:12

## 2020-11-18 RX ADMIN — RISPERIDONE 0.25 MG: 0.25 TABLET ORAL at 08:12

## 2020-11-18 RX ADMIN — DIVALPROEX SODIUM 250 MG: 250 TABLET, DELAYED RELEASE ORAL at 20:54

## 2020-11-18 RX ADMIN — ATORVASTATIN CALCIUM 10 MG: 10 TABLET, FILM COATED ORAL at 02:45

## 2020-11-18 RX ADMIN — PHENYTOIN SODIUM 300 MG: 100 CAPSULE ORAL at 08:11

## 2020-11-18 RX ADMIN — SORBITOL SOLUTION (BULK) 50 ML: 70 SOLUTION at 09:39

## 2020-11-18 RX ADMIN — AMLODIPINE BESYLATE 10 MG: 5 TABLET ORAL at 02:45

## 2020-11-18 RX ADMIN — RISPERIDONE 0.25 MG: 0.25 TABLET ORAL at 20:56

## 2020-11-18 RX ADMIN — LINACLOTIDE 145 MCG: 145 CAPSULE, GELATIN COATED ORAL at 11:14

## 2020-11-18 RX ADMIN — METOPROLOL TARTRATE 200 MG: 50 TABLET, FILM COATED ORAL at 20:55

## 2020-11-18 RX ADMIN — BUSPIRONE HYDROCHLORIDE 7.5 MG: 15 TABLET ORAL at 15:25

## 2020-11-18 RX ADMIN — METOPROLOL TARTRATE 200 MG: 50 TABLET, FILM COATED ORAL at 02:45

## 2020-11-18 RX ADMIN — METOPROLOL TARTRATE 200 MG: 50 TABLET, FILM COATED ORAL at 08:15

## 2020-11-18 RX ADMIN — SODIUM CHLORIDE 100 ML/HR: 0.9 INJECTION, SOLUTION INTRAVENOUS at 02:45

## 2020-11-18 RX ADMIN — WARFARIN SODIUM 4 MG: 4 TABLET ORAL at 02:45

## 2020-11-18 RX ADMIN — BUSPIRONE HYDROCHLORIDE 7.5 MG: 15 TABLET ORAL at 08:11

## 2020-11-18 RX ADMIN — LEVETIRACETAM 500 MG: 500 TABLET ORAL at 08:12

## 2020-11-18 RX ADMIN — POLYETHYLENE GLYCOL 3350, SODIUM SULFATE, SODIUM CHLORIDE, POTASSIUM CHLORIDE, ASCORBIC ACID, SODIUM ASCORBATE 1000 ML: KIT at 17:36

## 2020-11-18 RX ADMIN — HYDRALAZINE HYDROCHLORIDE 75 MG: 25 TABLET, FILM COATED ORAL at 20:54

## 2020-11-18 RX ADMIN — SERTRALINE HYDROCHLORIDE 50 MG: 50 TABLET ORAL at 08:11

## 2020-11-18 RX ADMIN — AMLODIPINE BESYLATE 10 MG: 5 TABLET ORAL at 20:55

## 2020-11-18 RX ADMIN — LISINOPRIL 40 MG: 20 TABLET ORAL at 08:11

## 2020-11-18 RX ADMIN — DIVALPROEX SODIUM 250 MG: 250 TABLET, DELAYED RELEASE ORAL at 08:11

## 2020-11-18 RX ADMIN — HYDRALAZINE HYDROCHLORIDE 75 MG: 25 TABLET, FILM COATED ORAL at 15:25

## 2020-11-18 RX ADMIN — BUSPIRONE HYDROCHLORIDE 7.5 MG: 15 TABLET ORAL at 20:54

## 2020-11-18 RX ADMIN — DOCUSATE SODIUM 100 MG: 100 CAPSULE, LIQUID FILLED ORAL at 20:55

## 2020-11-18 RX ADMIN — HYDRALAZINE HYDROCHLORIDE 75 MG: 25 TABLET, FILM COATED ORAL at 08:11

## 2020-11-18 RX ADMIN — LEVETIRACETAM 500 MG: 500 TABLET ORAL at 20:55

## 2020-11-18 RX ADMIN — Medication 10 ML: at 02:45

## 2020-11-18 RX ADMIN — POLYETHYLENE GLYCOL 3350 17 G: 17 POWDER, FOR SOLUTION ORAL at 08:10

## 2020-11-18 RX ADMIN — Medication 10 ML: at 20:54

## 2020-11-18 RX ADMIN — PHENYTOIN SODIUM 300 MG: 100 CAPSULE ORAL at 20:55

## 2020-11-18 RX ADMIN — DIGOXIN 250 MCG: 250 TABLET ORAL at 11:14

## 2020-11-18 RX ADMIN — WARFARIN 3 MG: 3 TABLET ORAL at 17:35

## 2020-11-18 RX ADMIN — Medication 10 ML: at 08:10

## 2020-11-18 NOTE — CONSULTS
"GI CONSULT  NOTE:    Referring Provider:  Dr. Selby    Chief complaint: Abdominal pain, constipation    Subjective . \"I've had pain\"    History of present illness: Jeremiah Henson is a 70 y.o. female who has a history of CVA, seizure disorder and sleep apnea.  She presents from the nursing facility with diffuse abdominal pain with constipation and distention.  Bedside RN in room who reports enema nursing facility without improvement.  Patient unfortunately is alert but poor historian and visually impaired.  Chart reviewed and discussed with bedside RN.  She reports chronic constipation and but is unable to quantify how often she has bowel movements.  She does report generalized abdominal pain.  No nausea or vomiting has been reported.  No fevers or rectal bleeding have been reported.  She denies chest pain or shortness of breath.      Endo History:  11/2012 colonoscopy by Dr. Jameson -hemorrhoids    Past Medical History:  Past Medical History:   Diagnosis Date   • CVA (cerebral vascular accident) (CMS/HCC)    • Hyperlipidemia    • Hypertension    • Seizure (CMS/HCC)    • Sleep apnea        Past Surgical History:  Past Surgical History:   Procedure Laterality Date   • HIP SURGERY     • PACEMAKER IMPLANTATION         Social History:  Social History     Tobacco Use   • Smoking status: Former Smoker     Years: 20.00   • Smokeless tobacco: Never Used   Substance Use Topics   • Alcohol use: No     Frequency: Never   • Drug use: No       Family History:  Family History   Problem Relation Age of Onset   • Lung cancer Father    • Heart attack Father        Medications:  Medications Prior to Admission   Medication Sig Dispense Refill Last Dose   • acetaminophen (TYLENOL) 325 MG tablet Take 650 mg by mouth Every 8 (Eight) Hours As Needed for Mild Pain , Moderate Pain  or Fever.   Unknown at Unknown time   • amLODIPine (NORVASC) 10 MG tablet Take 10 mg by mouth Every Night.   Unknown at Unknown time   • atorvastatin " (LIPITOR) 10 MG tablet Take 10 mg by mouth Every Night.   Unknown at Unknown time   • baclofen (LIORESAL) 10 MG tablet 15 mg 3 (Three) Times a Day.   Unknown at Unknown time   • busPIRone (BUSPAR) 7.5 MG tablet Take 7.5 mg by mouth 3 (Three) Times a Day.   Unknown at Unknown time   • Cholecalciferol 50 MCG (2000 UT) tablet Take 2,000 Units by mouth Daily.   Unknown at Unknown time   • ciprofloxacin (CIPRO) 250 MG tablet Take 250 mg by mouth 2 (Two) Times a Day.   Unknown at Unknown time   • digoxin (DIGOX) 250 MCG tablet Take 250 mcg by mouth Daily.   Unknown at Unknown time   • divalproex (DEPAKOTE) 125 MG DR tablet Take 250 mg by mouth 2 (Two) Times a Day.   Unknown at Unknown time   • docusate sodium (COLACE) 100 MG capsule Take 100 mg by mouth 2 (Two) Times a Day.   Unknown at Unknown time   • hydrALAZINE (APRESOLINE) 25 MG tablet Take 25 mg by mouth 3 (Three) Times a Day. Total dose = 75 mg; give with 50 mg tabs   Unknown at Unknown time   • hydrALAZINE (APRESOLINE) 50 MG tablet Take 50 mg by mouth 3 (Three) Times a Day. Total dose = 75 mg; give with 25 mg tabs   Unknown at Unknown time   • levETIRAcetam (KEPPRA) 500 MG tablet Take 500 mg by mouth 2 (Two) Times a Day.   Unknown at Unknown time   • lisinopril (PRINIVIL,ZESTRIL) 40 MG tablet Take 40 mg by mouth Daily.   Unknown at Unknown time   • Melatonin 3 MG tablet Take 3 mg by mouth Every Night.   Unknown at Unknown time   • metoprolol tartrate (LOPRESSOR) 100 MG tablet Take 200 mg by mouth 2 (Two) Times a Day.   Unknown at Unknown time   • Omega-3 1000 MG capsule Take 1 capsule by mouth 2 (two) times a day.   Unknown at Unknown time   • phenytoin extended (DILANTIN) 300 MG ER capsule Take 300 mg by mouth 2 (Two) Times a Day.   Unknown at Unknown time   • polyethylene glycol (MiraLax) 17 g packet Take 17 g by mouth 2 (Two) Times a Day.   Unknown at Unknown time   • risperiDONE (risperDAL) 0.25 MG tablet Take 0.25 mg by mouth 2 (Two) Times a Day.   Unknown at  Unknown time   • sertraline (ZOLOFT) 50 MG tablet Take 50 mg by mouth Daily.   Unknown at Unknown time   • warfarin (COUMADIN) 3 MG tablet Take 3 mg by mouth Daily.   Unknown at Unknown time       Scheduled Meds:amLODIPine, 10 mg, Oral, Nightly  atorvastatin, 10 mg, Oral, Nightly  busPIRone, 7.5 mg, Oral, TID  digoxin, 250 mcg, Oral, Daily  divalproex, 250 mg, Oral, BID  docusate sodium, 100 mg, Oral, BID  hydrALAZINE, 75 mg, Oral, TID  levETIRAcetam, 500 mg, Oral, BID  linaclotide, 145 mcg, Oral, Daily  lisinopril, 40 mg, Oral, Daily  metoprolol tartrate, 200 mg, Oral, Q12H  PEG-KCl-NaCl-NaSulf-Na Asc-C, 1,000 mL, Oral, Q12H  phenytoin extended, 300 mg, Oral, BID  risperiDONE, 0.25 mg, Oral, Q12H  sertraline, 50 mg, Oral, Daily  sodium chloride, 10 mL, Intravenous, Q12H  sodium chloride, 3 mL, Intravenous, Q12H      Continuous Infusions:Pharmacy to dose warfarin,       PRN Meds:.•  acetaminophen **OR** acetaminophen **OR** acetaminophen  •  aluminum-magnesium hydroxide-simethicone  •  bisacodyl  •  magnesium hydroxide  •  melatonin  •  ondansetron **OR** ondansetron  •  Pharmacy to dose warfarin  •  [COMPLETED] Insert peripheral IV **AND** sodium chloride  •  sodium chloride  •  sodium chloride    ALLERGIES:  Patient has no known allergies.    ROS:  The following systems were reviewed  Constitution:  No fevers, chills, no unintentional weight loss  Skin: no rash, no jaundice  Eyes:  No blurry vision, no eye pain  HENT:  No change in hearing or smell  Resp:  No dyspnea or cough  CV:  No chest pain or palpitations  :  No dysuria, hematuria  Musculoskeletal:  No leg cramps or arthralgias  Neuro:  No tremor, no numbness  Psych:  No depression or confusion    Objective Resting in bed, no acute distress, nurse in room    Vital Signs:   Vitals:    11/17/20 2202 11/17/20 2234 11/18/20 0419 11/18/20 0807   BP:  (!) 184/75 133/78 (!) 190/79   BP Location:  Left arm Left arm Left arm   Patient Position:  Lying Lying Lying    Pulse: 72 68 60 69   Resp:  18 17 18   Temp:  98.1 °F (36.7 °C) 97.7 °F (36.5 °C)    TempSrc:  Oral Oral    SpO2: 98% 92% 95% 96%   Weight:   87.6 kg (193 lb 2 oz)    Height:           Physical Exam:       General Appearance:    Awake and alert, in no acute distress, obese, does not open eyes during exam, poor historian   Head:    Normocephalic, without obvious abnormality, atraumatic   Throat:   No oral lesions, no thrush, oral mucosa moist   Lungs:     Respirations regular, even and unlabored   Chest Wall:    No abnormalities observed   Abdomen:     Soft, mild distention with generalized tenderness but no rebound or guarding, no obvious mass   Rectal:     Deferred   Extremities:   Moves all extremities, no edema, no cyanosis   Pulses:   Pulses palpable and equal bilaterally   Skin:   No rash, no jaundice, normal palpation       Neurologic:   Right sided paralysis       Results Review:   I reviewed the patient's labs and imaging.  CBC    Results from last 7 days   Lab Units 11/18/20 0537 11/17/20 1943   WBC 10*3/mm3 9.60 4.70   HEMOGLOBIN g/dL 13.4 19.6*   PLATELETS 10*3/mm3 236 91*     CMP   Results from last 7 days   Lab Units 11/18/20 0410 11/17/20 1943   SODIUM mmol/L 137 138   POTASSIUM mmol/L 4.0 3.9   CHLORIDE mmol/L 100 100   CO2 mmol/L 26.0 28.0   BUN mg/dL 15 14   CREATININE mg/dL 0.76 0.66   GLUCOSE mg/dL 117* 137*   ALBUMIN g/dL  --  3.90   BILIRUBIN mg/dL  --  0.4   ALK PHOS U/L  --  103   AST (SGOT) U/L  --  17   ALT (SGPT) U/L  --  16   MAGNESIUM mg/dL 1.7  --    PHOSPHORUS mg/dL 3.7  --    LIPASE U/L  --  57     Cr Clearance Estimated Creatinine Clearance: 69.4 mL/min (by C-G formula based on SCr of 0.76 mg/dL).  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 2.Large amount of stool throughout the dilated colon including stool throughout the transverse and ascending portions of the colon to the cecum. There is slight wall thickening at the anorectal region. Clinical and physical exam correlation required to ensure there is no underlying mass.  Electronically Signed By-Chuckie Garcia MD On:11/17/2020 9:10 PM This report was finalized on [id number] by  Chuckie Garcia MD.         ASSESSMENT:  Generalized abdominal pain with constipation  Abnormal CT of the rectum  Abnormal CT of the renal collecting systems  History of CVA/seizure disorder  BRIE  History of A. Fib/pacemaker-on warfarin    PLAN:  Patient presents from the nursing facility with abdominal pain, distention and constipation.  Unfortunately patient is a poor historian and unable to provide significant history.  We will plan full bowel purge as chronic constipation likely contributing to her symptoms with possible anal rectal inflammation/stercoral ulcer from constipation but cannot rule out mass.  We will proceed with colonoscopy evaluation in a.m.. Discussed with bedside RN during rounds.  Will follow.    I discussed the patients findings and my recommendations with the patient.    We appreciate the referral    Tere Collier, JESUS  11/18/20  10:10 EST

## 2020-11-18 NOTE — PLAN OF CARE
Problem: Adult Inpatient Plan of Care  Goal: Absence of Hospital-Acquired Illness or Injury  Intervention: Identify and Manage Fall Risk  Recent Flowsheet Documentation  Taken 11/18/2020 0100 by Julián Livingston LPN  Safety Promotion/Fall Prevention:   toileting scheduled   safety round/check completed   room organization consistent   nonskid shoes/slippers when out of bed   muscle strengthening facilitated   mobility aid in reach  Intervention: Prevent Skin Injury  Recent Flowsheet Documentation  Taken 11/18/2020 0100 by Julián Livingston LPN  Body Position: position changed independently  Intervention: Prevent Infection  Recent Flowsheet Documentation  Taken 11/18/2020 0100 by Julián Livingston LPN  Infection Prevention:   personal protective equipment utilized   hand hygiene promoted   rest/sleep promoted   single patient room provided     Problem: Fall Injury Risk  Goal: Absence of Fall and Fall-Related Injury  Intervention: Identify and Manage Contributors to Fall Injury Risk  Recent Flowsheet Documentation  Taken 11/18/2020 0100 by Julián Livingston LPN  Medication Review/Management: medications reviewed  Intervention: Promote Injury-Free Environment  Recent Flowsheet Documentation  Taken 11/18/2020 0100 by Julián Livingston LPN  Safety Promotion/Fall Prevention:   toileting scheduled   safety round/check completed   room organization consistent   nonskid shoes/slippers when out of bed   muscle strengthening facilitated   mobility aid in reach     Problem: Skin Injury Risk Increased  Goal: Skin Health and Integrity  Intervention: Optimize Skin Protection  Recent Flowsheet Documentation  Taken 11/18/2020 0100 by Julián Livingston LPN  Head of Bed (HOB): HOB at 20-30 degrees   Goal Outcome Evaluation:

## 2020-11-18 NOTE — ED PROVIDER NOTES
"Subjective   Chief complaint: Abdominal pain    70-year-old female presents with abdominal pain and distention.  Patient is a poor historian and cannot state how long this has been going on.  Pain is described as moderate in intensity.  She reports no vomiting.  She is unsure when her last bowel movement was.  She has had no fever.  No alleviating or exacerbating factors.      History provided by:  Patient      Review of Systems   Constitutional: Negative for fever.   HENT: Negative for congestion and sore throat.    Eyes: Negative for redness.   Respiratory: Negative for cough and shortness of breath.    Cardiovascular: Negative for chest pain.   Gastrointestinal: Positive for abdominal distention and abdominal pain. Negative for diarrhea and vomiting.   Genitourinary: Negative for dysuria.   Musculoskeletal: Negative for back pain.   Skin: Negative for rash.   Neurological: Negative for dizziness and headaches.   Psychiatric/Behavioral: Negative for confusion.       Past Medical History:   Diagnosis Date   • CVA (cerebral vascular accident) (CMS/HCC)    • Hypertension    • Seizure (CMS/[facility])    • Sleep apnea        No Known Allergies    Past Surgical History:   Procedure Laterality Date   • HIP SURGERY     • PACEMAKER IMPLANTATION         Family History   Problem Relation Age of Onset   • Lung cancer Father    • Heart attack Father        Social History     Socioeconomic History   • Marital status:      Spouse name: Not on file   • Number of children: Not on file   • Years of education: Not on file   • Highest education level: Not on file   Tobacco Use   • Smoking status: Former Smoker     Years: 20.00   • Smokeless tobacco: Never Used   Substance and Sexual Activity   • Alcohol use: No     Frequency: Never   • Drug use: No   • Sexual activity: Defer       BP (!) 190/83   Pulse 86   Temp 98.2 °F (36.8 °C)   Resp 16   Ht 165.1 cm (65\")   Wt 93 kg (205 lb)   SpO2 98%   BMI 34.11 kg/m²       Objective "   Physical Exam  Constitutional:       Appearance: She is well-developed.   HENT:      Head: Normocephalic and atraumatic.   Eyes:      Extraocular Movements: Extraocular movements intact.      Pupils: Pupils are equal, round, and reactive to light.   Neck:      Musculoskeletal: Normal range of motion and neck supple.   Cardiovascular:      Rate and Rhythm: Normal rate and regular rhythm.      Heart sounds: Normal heart sounds.   Pulmonary:      Effort: Pulmonary effort is normal. No respiratory distress.      Breath sounds: Normal breath sounds.   Abdominal:      General: Bowel sounds are normal. There is distension.      Palpations: Abdomen is soft.      Tenderness: There is generalized abdominal tenderness.   Musculoskeletal: Normal range of motion.   Skin:     General: Skin is warm and dry.   Neurological:      Mental Status: She is alert and oriented to person, place, and time.      Comments: Right-sided paralysis from previous CVA         Procedures           ED Course      Results for orders placed or performed during the hospital encounter of 11/17/20   Comprehensive Metabolic Panel    Specimen: Blood   Result Value Ref Range    Glucose 137 (H) 65 - 99 mg/dL    BUN 14 8 - 23 mg/dL    Creatinine 0.66 0.57 - 1.00 mg/dL    Sodium 138 136 - 145 mmol/L    Potassium 3.9 3.5 - 5.2 mmol/L    Chloride 100 98 - 107 mmol/L    CO2 28.0 22.0 - 29.0 mmol/L    Calcium 8.8 8.6 - 10.5 mg/dL    Total Protein 6.8 6.0 - 8.5 g/dL    Albumin 3.90 3.50 - 5.20 g/dL    ALT (SGPT) 16 1 - 33 U/L    AST (SGOT) 17 1 - 32 U/L    Alkaline Phosphatase 103 39 - 117 U/L    Total Bilirubin 0.4 0.0 - 1.2 mg/dL    eGFR Non African Amer 88 >60 mL/min/1.73    Globulin 2.9 gm/dL    A/G Ratio 1.3 g/dL    BUN/Creatinine Ratio 21.2 7.0 - 25.0    Anion Gap 10.0 5.0 - 15.0 mmol/L   Lipase    Specimen: Blood   Result Value Ref Range    Lipase 57 13 - 60 U/L   Protime-INR    Specimen: Blood   Result Value Ref Range    Protime 12.4 (L) 19.4 - 28.5 Seconds     INR 1.13 (L) 2.00 - 3.00   aPTT    Specimen: Blood   Result Value Ref Range    PTT 29.2 24.0 - 31.0 seconds   CBC Auto Differential    Specimen: Blood   Result Value Ref Range    WBC 4.70 3.40 - 10.80 10*3/mm3    RBC 6.07 (H) 3.77 - 5.28 10*6/mm3    Hemoglobin 19.6 (H) 12.0 - 15.9 g/dL    Hematocrit 58.6 (H) 34.0 - 46.6 %    MCV 96.5 79.0 - 97.0 fL    MCH 32.4 26.6 - 33.0 pg    MCHC 33.5 31.5 - 35.7 g/dL    RDW 13.7 12.3 - 15.4 %    RDW-SD 45.5 37.0 - 54.0 fl    MPV 8.3 6.0 - 12.0 fL    Platelets 91 (L) 140 - 450 10*3/mm3    Neutrophil % 78.8 (H) 42.7 - 76.0 %    Lymphocyte % 11.4 (L) 19.6 - 45.3 %    Monocyte % 9.1 5.0 - 12.0 %    Eosinophil % 0.6 0.3 - 6.2 %    Basophil % 0.1 0.0 - 1.5 %    Neutrophils, Absolute 3.70 1.70 - 7.00 10*3/mm3    Lymphocytes, Absolute 0.50 (L) 0.70 - 3.10 10*3/mm3    Monocytes, Absolute 0.40 0.10 - 0.90 10*3/mm3    Eosinophils, Absolute 0.00 0.00 - 0.40 10*3/mm3    Basophils, Absolute 0.00 0.00 - 0.20 10*3/mm3    nRBC 0.0 0.0 - 0.2 /100 WBC   [place] Blood Culture Bottle Set    Specimen: Arm, Left; Blood   Result Value Ref Range    Extra Tube Hold for add-ons.      Ct Abdomen Pelvis With Contrast    Result Date: 11/17/2020  1.Duplicated right-sided renal collecting system with an obstructed upper pole calyx and significantly dilated ureter to the level of the ectopic ureterocele. 2.Large amount of stool throughout the dilated colon including stool throughout the transverse and ascending portions of the colon to the cecum. There is slight wall thickening at the anorectal region. Clinical and physical exam correlation required to ensure there is no underlying mass.  Electronically Signed By-Chuckie Garcia MD On:11/17/2020 9:10 PM This report was finalized on [id number] by  Chuckie Garcia MD.                                         MDM   Patient had the above evaluation.  Results were discussed with the patient.  Labs were fairly unremarkable.  CT scan is showing a large amount of stool  throughout the colon with slight wall thickening at the anorectal region which could represent a mass.  Patient will be admitted for GI consult.  I discussed with the nurse practitioner on-call for the hospitalist who agreed to admit.      Final diagnoses:   Abdominal pain, unspecified abdominal location   Constipation, unspecified constipation type            Jer Jiménez MD  11/17/20 4696

## 2020-11-18 NOTE — PROGRESS NOTES
"Pharmacy dosing service  Anticoagulant  Warfarin     Subjective:    Jeremiah Henson is a 70 y.o.female being continued on warfarin for atrial fibrillation.    INR Goal: 2 - 3  Home medication?: Yes, warfarin 3 mg PO every day at 1800  Bridge Therapy Present?:  No  Interacting Medications Evaluation (New/Present/Discontinued): phenytoin, divalproex, sertraline, digoxin  Additional Contributing Factors: patient constipated on arrival      Assessment/Plan:    Patient INR subtherapeutic at 1.13 so ordered once boost dose of warfarin 4mg. Dayshift will follow up and schedule subsequent doses.    Continue to monitor and adjust based on INR.         Date 11/17           INR 1.13           Dose 4mg once               Objective:  [Ht: 165.1 cm (65\"); Wt: 93 kg (205 lb); BMI: Body mass index is 34.11 kg/m².]    Lab Results   Component Value Date    ALBUMIN 3.90 11/17/2020     Lab Results   Component Value Date    INR 1.13 (L) 11/17/2020    INR 2.65 (H) 04/04/2020    INR 1.91 (H) 04/05/2019    PROTIME 12.4 (L) 11/17/2020    PROTIME 28.0 (H) 04/04/2020    PROTIME 21.5 (H) 04/05/2019     Lab Results   Component Value Date    HGB 19.6 (H) 11/17/2020    HGB 14.2 10/07/2017    HGB 15.3 (H) 10/06/2017     Lab Results   Component Value Date    HCT 58.6 (H) 11/17/2020    HCT 41.3 10/07/2017    HCT 44.0 10/06/2017       Julia Meadows RPH  11/18/20 04:12 EST     "

## 2020-11-18 NOTE — PLAN OF CARE
Per physical therapy, pt is from nursing home and is at baseline level of function.  Will sign off.   Martha Batres OTR/L

## 2020-11-18 NOTE — H&P
[facility] Medicine Services      Patient Name: Jeremiah Henson  : 1950  MRN: [id number]  Primary Care Physician: Anna Barkley MD  Date of admission: 2020    Patient Care Team:  Anna Barkley MD as PCP - General (Geriatric Medicine)          Subjective   History Present Illness     Chief Complaint:   Chief Complaint   Patient presents with   • Abdominal Pain     Abdominal pain    Ms. Henson is a 70 y.o.  presents to [facility] complaining of diffusely located abdominal pain that is associated with constipation and distention. Patient is unable to say when this started and is unable to describe the pain. She does not notice a pattern to the pain. She does report that having a bowel movement improves the pain but is unable to state the last time she had a BM. Patient denies any fever, chills, nausea/vomiting, melena or bright red blood per rectum. She is a poor historian and is unable to provide any further details.          History of Present Illness    Review of Systems   All other systems reviewed and are negative.          Personal History     Past Medical History:   Past Medical History:   Diagnosis Date   • CVA (cerebral vascular accident) (CMS/HCC)    • Hyperlipidemia    • Hypertension    • Seizure (CMS/HCC)    • Sleep apnea        Surgical History:      Past Surgical History:   Procedure Laterality Date   • HIP SURGERY     • PACEMAKER IMPLANTATION             Family History: family history includes Heart attack in her father; Lung cancer in her father. Otherwise pertinent FHx was reviewed and unremarkable.     Social History:  reports that she has quit smoking. She quit after 20.00 years of use. She has never used smokeless tobacco. She reports that she does not drink alcohol or use drugs.      Medications:  Prior to Admission medications    Medication Sig Start Date End Date Taking? Authorizing Provider   acetaminophen (TYLENOL) 325 MG tablet  Take 650 mg by mouth Every 8 (Eight) Hours As Needed for Mild Pain , Moderate Pain  or Fever.    Som Stringer MD   amLODIPine (NORVASC) 10 MG tablet Take 10 mg by mouth Every Night. 5/7/15   Som Stringer MD   atorvastatin (LIPITOR) 10 MG tablet Take 10 mg by mouth Every Night. 7/21/16   Som Stringer MD   baclofen (LIORESAL) 10 MG tablet 15 mg 3 (Three) Times a Day.    Som Stringer MD   busPIRone (BUSPAR) 7.5 MG tablet Take 7.5 mg by mouth 3 (Three) Times a Day.    Som Stringer MD   Cholecalciferol 50 MCG (2000 UT) tablet Take 2,000 Units by mouth Daily.    Som Stringer MD   ciprofloxacin (CIPRO) 250 MG tablet Take 250 mg by mouth 2 (Two) Times a Day. 11/16/20 11/23/20  Som Stringer MD   digoxin (DIGOX) 250 MCG tablet Take 250 mcg by mouth Daily.    Som Stringer MD   divalproex (DEPAKOTE) 125 MG DR tablet Take 250 mg by mouth 2 (Two) Times a Day.    Som Stringer MD   docusate sodium (COLACE) 100 MG capsule Take 100 mg by mouth 2 (Two) Times a Day.    Som Stringer MD   hydrALAZINE (APRESOLINE) 25 MG tablet Take 25 mg by mouth 3 (Three) Times a Day. Total dose = 75 mg; give with 50 mg tabs    Som Stringer MD   hydrALAZINE (APRESOLINE) 50 MG tablet Take 50 mg by mouth 3 (Three) Times a Day. Total dose = 75 mg; give with 25 mg tabs    Som Stringer MD   levETIRAcetam (KEPPRA) 500 MG tablet Take 500 mg by mouth 2 (Two) Times a Day. 5/7/15   Som Stringer MD   lisinopril (PRINIVIL,ZESTRIL) 40 MG tablet Take 40 mg by mouth Daily. 7/21/16   Som Stringer MD   Melatonin 3 MG tablet Take 3 mg by mouth Every Night.    Som Stringer MD   metoprolol tartrate (LOPRESSOR) 100 MG tablet Take 200 mg by mouth 2 (Two) Times a Day.    Som Stringer MD   Omega-3 1000 MG capsule Take 1 capsule by mouth 2 (two) times a day.    Som Stringer MD   phenytoin extended (DILANTIN) 300 MG ER capsule  Take 300 mg by mouth 2 (Two) Times a Day.    Som Stringer MD   polyethylene glycol (MiraLax) 17 g packet Take 17 g by mouth 2 (Two) Times a Day.    Som Stringer MD   risperiDONE (risperDAL) 0.25 MG tablet Take 0.25 mg by mouth 2 (Two) Times a Day.    Som Stringer MD   sertraline (ZOLOFT) 50 MG tablet Take 50 mg by mouth Daily.    Som Stringer MD   warfarin (COUMADIN) 3 MG tablet Take 3 mg by mouth Daily.    Som Stringer MD   calcium carbonate-vitamin d (CALCIUM 600+D HIGH POTENCY) 600-400 MG-UNIT per tablet CALCIUM 400 7/21/16 11/17/20  Som Stringer MD   metoprolol tartrate (LOPRESSOR) 50 MG tablet METOPROLOL TARTRATE TABS 5/7/15 11/17/20  Som Stringer MD   phenytoin (DILANTIN) 100 MG ER capsule Dilantin Extended 100 mg capsule 5/7/15 11/17/20  Som Stringer MD   Polyethylene Glycol 3350 granules Take 17 g by mouth 2 (two) times a day.  11/17/20  Som Stringer MD   Pseudoephedrine-Acetaminophen  MG tablet acetaminophen 325 mg tablet  11/17/20  Som Stringer MD   warfarin (COUMADIN) 4 MG tablet warfarin 4 mg tablet  11/17/20  Som Stringer MD   warfarin (COUMADIN) 5 MG tablet warfarin 5 mg tablet  11/17/20  Som Stringer MD       Allergies:  No Known Allergies    Objective   Objective     Vital Signs  Temp:  [98.1 °F (36.7 °C)-98.2 °F (36.8 °C)] 98.1 °F (36.7 °C)  Heart Rate:  [68-86] 68  Resp:  [16-18] 18  BP: (152-190)/(49-83) 184/75  SpO2:  [2 %-100 %] 2 %  on   ;   Device (Oxygen Therapy): room air  Body mass index is 34.11 kg/m².    Physical Exam  Vitals signs reviewed.   Constitutional:       General: She is not in acute distress.     Appearance: Normal appearance. She is obese. She is not ill-appearing, toxic-appearing or diaphoretic.   HENT:      Head: Normocephalic and atraumatic.      Right Ear: External ear normal.      Left Ear: External ear normal.      Nose: Nose normal.      Mouth/Throat:       Comments: Wearing mask  Eyes:      General: No scleral icterus.        Right eye: No discharge.         Left eye: No discharge.      Comments: Wearing mask   Neck:      Musculoskeletal: Neck supple.   Cardiovascular:      Rate and Rhythm: Normal rate and regular rhythm.      Pulses: Normal pulses.      Heart sounds: Normal heart sounds.   Pulmonary:      Effort: Pulmonary effort is normal. No respiratory distress.      Breath sounds: Normal breath sounds. No wheezing or rales.   Abdominal:      Comments: Mildly firm with distention; hypoactive bowel sounds; more tender in LLQ with increased firmness but she is mildly tender RLQ; guarding on LLQ   Musculoskeletal: Normal range of motion.      Right lower leg: No edema.      Left lower leg: No edema.   Skin:     General: Skin is warm and dry.   Neurological:      General: No focal deficit present.      Mental Status: She is alert. Mental status is at baseline.   Psychiatric:         Mood and Affect: Mood normal.         Behavior: Behavior normal.         Results Review:  I have personally reviewed most recent cardiac tracings, lab results, microbiology results, pathology results and radiology images and interpretations and agree with findings, most notably: .    Results from last 7 days   Lab Units 11/17/20 1943   WBC 10*3/mm3 4.70   HEMOGLOBIN g/dL 19.6*   HEMATOCRIT % 58.6*   PLATELETS 10*3/mm3 91*   INR  1.13*     Results from last 7 days   Lab Units 11/17/20 1943   SODIUM mmol/L 138   POTASSIUM mmol/L 3.9   CHLORIDE mmol/L 100   CO2 mmol/L 28.0   BUN mg/dL 14   CREATININE mg/dL 0.66   GLUCOSE mg/dL 137*   CALCIUM mg/dL 8.8   ALT (SGPT) U/L 16   AST (SGOT) U/L 17     Estimated Creatinine Clearance: 71.6 mL/min (by C-G formula based on SCr of 0.66 mg/dL).  Brief Urine Lab Results     None          Microbiology Results (last 10 days)     ** No results found for the last 240 hours. **          ECG/EMG Results (most recent)     None                    Ct Abdomen  Pelvis With Contrast    Result Date: 11/17/2020  1.Duplicated right-sided renal collecting system with an obstructed upper pole calyx and significantly dilated ureter to the level of the ectopic ureterocele. 2.Large amount of stool throughout the dilated colon including stool throughout the transverse and ascending portions of the colon to the cecum. There is slight wall thickening at the anorectal region. Clinical and physical exam correlation required to ensure there is no underlying mass.  Electronically Signed By-Chuckie Garcia MD On:11/17/2020 9:10 PM This report was finalized on [id number] by  Chuckie Garcia MD.        Estimated Creatinine Clearance: 71.6 mL/min (by C-G formula based on SCr of 0.66 mg/dL).    Assessment/Plan   Assessment/Plan       Active Hospital Problems    Diagnosis  POA   • **Abdominal pain [R10.9]  Yes   • Constipation [K59.00]  Yes   • Seizure disorder (CMS/HCC) [G40.909]  Yes   • Sleep apnea [G47.30]  Yes   • Atrial fibrillation (CMS/HCC) [I48.91]  Yes   • Hypertensive disorder [I10]  Yes      Resolved Hospital Problems   No resolved problems to display.       Acute Abdominal pain likely related to acute constipation with concern for underlying rectal mass  - GI consult ordered   - Miralax ordered daily along with PRN constipation medication  - NPO  - IVFs ordered     HTN: continue amlodipine and hydralazine and lisinopril and metoprolol    Obesity: BMI 34.11    Seizure Disorder: continue Keppra and dilaantin    AFIB: continue Digoxin; will check level; continue metoprolol; continue ACT with coumadin and daily PT/INR ordered    BRIE: Supplemental O2 QHS PRN               VTE Prophylaxis -   Mechanical Order History:     None      Pharmalogical Order History:     Home coumadin          CODE STATUS:    There are no questions and answers to display.       This patient has been examined wearing appropriate Personal Protective Equipment and discussed with ed provider. 11/18/20      I  discussed the patient's findings and my recommendations with patient.      Signature:Electronically signed by Britni Molina PA-C, 11/18/20, 1:24 AM EST.      [facility] Fragoso Hospitalist Team

## 2020-11-18 NOTE — PROGRESS NOTES
"Pharmacy dosing service  Anticoagulant  Warfarin     Subjective:    Jeremiah Henson is a 70 y.o.female being continued on warfarin for atrial fibrillation.    INR Goal: 2 - 3  Home medication?: Yes, warfarin 3 mg PO every day at 1800  Bridge Therapy Present?:  No  Interacting Medications Evaluation (New/Present/Discontinued): phenytoin, divalproex, sertraline, digoxin  Additional Contributing Factors:       Assessment/Plan:    INR on admission subtherapeutic so patient was given increased dose of 4mg yesterday. Will resume home dosing today but patient may require overall dose increase if INR remains subtherapeutic.      Continue to monitor and adjust based on INR.         Date 11/17 11/18          INR 1.13 1.11          Dose 4mg once 3mg               Objective:  [Ht: 165.1 cm (65\"); Wt: 87.6 kg (193 lb 2 oz); BMI: Body mass index is 32.14 kg/m².]    Lab Results   Component Value Date    ALBUMIN 3.90 11/17/2020     Lab Results   Component Value Date    INR 1.11 (L) 11/18/2020    INR 1.13 (L) 11/17/2020    INR 2.65 (H) 04/04/2020    PROTIME 12.2 (L) 11/18/2020    PROTIME 12.4 (L) 11/17/2020    PROTIME 28.0 (H) 04/04/2020     Lab Results   Component Value Date    HGB 13.4 11/18/2020    HGB 19.6 (H) 11/17/2020    HGB 14.2 10/07/2017     Lab Results   Component Value Date    HCT 39.3 11/18/2020    HCT 58.6 (H) 11/17/2020    HCT 41.3 10/07/2017       Yue Diaz RPH  11/18/20 11:29 EST       "

## 2020-11-18 NOTE — PROGRESS NOTES
Discharge Planning Assessment   [place]     Patient Name: Jeremiah Henson  MRN: [id number]  Today's Date: 11/18/2020    Admit Date: 11/17/2020    Discharge Needs Assessment     Row Name 11/18/20 1422       Living Environment    Lives With  facility resident per patient she resides at [facility]    Primary Care Provided by  other (see comments) staff at rehab facility    Able to Return to Prior Arrangements  other (see comments) patient states that she is from [facility]       Transition Planning    Patient/Family Anticipates Transition to  long-term care facility    Patient/Family Anticipated Services at Transition         Discharge Needs Assessment    Equipment Currently Used at Home  wheelchair    Concerns to be Addressed  discharge planning    Provided Post Acute Provider List?  N/A    N/A Provider List Comment  patient states she is from [facility]; attempting to verify with rep        Discharge Plan     Row Name 11/18/20 1426       Plan    Plan  from [facility] and need verification from liason    Patient/Family in Agreement with Plan  yes    Plan Comments  spoke to patient in room with ppe(mask and goggles); staying 6 feet away and less than 15 mins; patient states she is from [facility] and verifying with liason       Carol naegele rn  Case management  Office number [phone]  Cell phone [phone]

## 2020-11-18 NOTE — DISCHARGE PLACEMENT REQUEST
"Taqueria Henson (70 y.o. Female)     Date of Birth Social Security Number Address [place] Phone MRN    1950  [id number] [place] [address] [phone] [id number]    Bahai Marital Status          None        Admission Date Admission Type Admitting Provider Attending Provider Department, Room/Bed    11/17/20 Emergency Chandra Selby MD Piwko, Radomir, MD [facility] 3C MEDICAL INPATIENT, 366/1    Discharge Date Discharge Disposition Discharge Destination                       Attending Provider: Chandra Selby MD    Allergies: No Known Allergies    Isolation: None   Infection: None   Code Status: CPR    Ht: 165.1 cm (65\")   Wt: 87.6 kg (193 lb 2 oz)    Admission Cmt: None   Principal Problem: Abdominal pain [R10.9]                 Active Insurance as of 11/17/2020     Primary Coverage     Payor Plan Insurance Group Employer/Plan Group    MEDICARE MEDICARE A & B      Payor Plan Address Payor Plan Phone Number Payor Plan Fax Number Effective Dates    [address] [phone]  12/1/2001 - None Entered    [facility] [id number]       Subscriber Name Subscriber Birth Date Member ID       TAQUERIA HENSON 1950 [id number]           Secondary Coverage     Payor Plan Insurance Group Employer/Plan Group    [place]      Payor Plan Address Payor Plan Phone Number Payor Plan Fax Number Effective Dates    [address]   7/21/2019 - None Entered    [place] [id number]       Subscriber Name Subscriber Birth Date Member ID       TAQUERIA HENSON 1950 [id number]                 Emergency Contacts      (Rel.) Home Phone Work Phone Mobile Phone    PRAVEEN PARSONS (Daughter) -- -- [phone]              "

## 2020-11-18 NOTE — THERAPY EVALUATION
Patient Name: Jeremiah Henson  : 1950    MRN: [id number]                              Today's Date: 2020       Admit Date: 2020    Visit Dx:     ICD-10-CM ICD-9-CM   1. Abdominal pain, unspecified abdominal location  R10.9 789.00   2. Constipation, unspecified constipation type  K59.00 564.00   3. Left lower quadrant abdominal pain  R10.32 789.04     Patient Active Problem List   Diagnosis   • Atrial fibrillation (CMS/HCC)   • Cerebral infarction (CMS/HCC)   • Family history of lung cancer   • Hemiplegia of dominant side (CMS/HCC)   • Hyperlipidemia   • Hypertensive disorder   • Seizure disorder (CMS/HCC)   • Sleep apnea   • Abscess of back   • Chronic pain disorder   • Closed fracture of right hand   • Seasonal allergic rhinitis   • Urinary tract infectious disease   • Vitamin D deficiency   • Seizure disorder (CMS/HCC)   • Abdominal pain   • Anxiety   • Constipation   • Depressive disorder   • Mood disorder (CMS/HCC)   • Dislocation of shoulder joint   • Mechanical ileus (CMS/HCC)   • Left lower quadrant abdominal pain     Past Medical History:   Diagnosis Date   • CVA (cerebral vascular accident) (CMS/HCC)    • Hyperlipidemia    • Hypertension    • Seizure (CMS/HCC)    • Sleep apnea      Past Surgical History:   Procedure Laterality Date   • HIP SURGERY     • PACEMAKER IMPLANTATION       General Information     Row Name 20 1220          Physical Therapy Time and Intention    Document Type  evaluation  -CR     Mode of Treatment  physical therapy  -CR     Row Name 20 1220          General Information    Patient Profile Reviewed  yes  -CR     Prior Level of Function  dependent:;w/c or scooter  -CR     Barriers to Rehab  medically complex;previous functional deficit  -CR     Row Name 20 1220          Living Environment    Lives With  facility resident from [facility]  -     Row Name 20 1220          Home Main Entrance    Number of Stairs, Main Entrance  none  -CR     Row Name  11/18/20 1220          Stairs Within Home, Primary    Number of Stairs, Within Home, Primary  none  -CR     Row Name 11/18/20 1220          Cognition    Orientation Status (Cognition)  oriented to;person  -CR     Row Name 11/18/20 1220          Safety Issues, Functional Mobility    Impairments Affecting Function (Mobility)  coordination;pain;range of motion (ROM);strength;muscle tone abnormal;balance  -CR       User Key  (r) = Recorded By, (t) = Taken By, (c) = Cosigned By    Initials Name Provider Type    CR Reyes, Carmela, PT Physical Therapist        Mobility     Row Name 11/18/20 1222          Bed Mobility    Bed Mobility  scooting/bridging;supine-sit-supine  -CR     Scooting/Bridging Pearl (Bed Mobility)  dependent (less than 25% patient effort);2 person assist  -CR     Supine-Sit-Supine [place] (Bed Mobility)  dependent (less than 25% patient effort);1 person assist  -CR     Assistive Device (Bed Mobility)  draw sheet;head of bed elevated  -CR     Comment (Bed Mobility)  resisting sitting EOB  -CR     Row Name 11/18/20 1222          Sit-Stand Transfer    Sit-Stand Pearl (Transfers)  unable to assess  -CR       User Key  (r) = Recorded By, (t) = Taken By, (c) = Cosigned By    Initials Name Provider Type    CR Reyes, Carmela, PT Physical Therapist        Obj/Interventions     Row Name 11/18/20 1223          Range of Motion Comprehensive    Comment, General Range of Motion  AROM LLE wfl; mod limit AROM RLE  -CR     Row Name 11/18/20 1223          Strength Comprehensive (MMT)    Comment, General Manual Muscle Testing (MMT) Assessment  LLE grossly 3/5, RLE grossly 2-/5  -CR     Row Name 11/18/20 1223          Balance    Balance Assessment  sitting static balance  -CR     Static Sitting Balance  severe impairment;sitting, edge of bed  -CR     Row Name 11/18/20 1223          Sensory Assessment (Somatosensory)    Sensory Assessment (Somatosensory)  left-sided sensation intact  -CR       User Key   (r) = Recorded By, (t) = Taken By, (c) = Cosigned By    Initials Name Provider Type    CR Reyes, Carmela, PT Physical Therapist        Goals/Plan    No documentation.       Clinical Impression     Row Name 11/18/20 1224          Pain    Additional Documentation  Pain Scale: FACES Pre/Post-Treatment (Group)  -CR     Row Name 11/18/20 1224          Pain Scale: FACES Pre/Post-Treatment    Pain: FACES Scale, Pretreatment  2-->hurts little bit  -CR     Posttreatment Pain Rating  6-->hurts even more  -CR     Pain Location - Side  Right  -CR     Pain Location  shoulder  -CR     Pre/Posttreatment Pain Comment  pain with movement  -CR     Row Name 11/18/20 1224          Plan of Care Review    Plan of Care Reviewed With  patient  -CR     Outcome Summary  71 [place]/17 due to abd pain, found to have acute constipation. PMH includes cva with residual R side weakness, HTN, sz, sleep apnea, afib. Pt requires total assist with mobility at baseline and remains a total assist for bed mobility this evaluation. Pt did not want to sit Eob , pushing posteriorly, thus unable to assess sitting balance. Pt needs extensive assistance and care but has poor rehab potential. Will sign off , recommend pt return to [facility]. PPE gloves, mask with shield.  -CR     Row Name 11/18/20 1220          Positioning and Restraints    Pre-Treatment Position  in bed  -CR     Post Treatment Position  bed  -CR     In Bed  notified nsg;exit alarm on  -CR       User Key  (r) = Recorded By, (t) = Taken By, (c) = Cosigned By    Initials Name Provider Type    CR Reyes, Carmela, PT Physical Therapist        Outcome Measures     Row Name 11/18/20 1252          How much help from another person do you currently need...    Turning from your back to your side while in flat bed without using bedrails?  2  -CR     Moving from lying on back to sitting on the side of a flat bed without bedrails?  2  -CR     Moving to and from a bed to a  chair (including a wheelchair)?  1  -CR     Standing up from a chair using your arms (e.g., wheelchair, bedside chair)?  1  -CR     Climbing 3-5 steps with a railing?  1  -CR     To walk in hospital room?  1  -CR     AM-PAC 6 Clicks Score (PT)  8  -CR     Row Name 11/18/20 1250          Functional Assessment    Outcome Measure Options  AM-PAC 6 Clicks Basic Mobility (PT)  -CR       User Key  (r) = Recorded By, (t) = Taken By, (c) = Cosigned By    Initials Name Provider Type    CR Reyes, Carmela, LUIS FELIPE Physical Therapist        Physical Therapy Education                 Title: PT OT SLP Therapies (Resolved)     Topic: Physical Therapy (Resolved)     Point: Mobility training (Resolved)     Learning Progress Summary           Patient Acceptance, E, VU by CR at 11/18/2020 1251                               User Key     Initials Effective Dates Name Provider Type Discipline    CR 03/01/19 -  Reyes, Carmela, PT Physical Therapist PT              PT Recommendation and Plan     Plan of Care Reviewed With: patient  Outcome Summary: 69 [place] on 11/17 due to abd pain, found to have acute constipation. PMH includes cva with residual R side weakness, HTN, sz, sleep apnea, afib. Pt requires total assist with mobility at baseline and remains a total assist for bed mobility this evaluation. Pt did not want to sit Eob , pushing posteriorly, thus unable to assess sitting balance. Pt needs extensive assistance and care but has poor rehab potential. Will sign off , recommend pt return to [facility]. PPE gloves, mask with shield.     Time Calculation:   PT Charges     Row Name 11/18/20 1252             Time Calculation    Start Time  0906  -CR      Stop Time  0921  -CR      Time Calculation (min)  15 min  -CR      PT Received On  11/18/20  -CR         Time Calculation- PT    Total Timed Code Minutes- PT  0 minute(s)  -CR        User Key  (r) = Recorded By, (t) = Taken By, (c) = Cosigned By    Initials Name  Provider Type    CR Reyes, Carmela, PT Physical Therapist        Therapy Charges for Today     Code Description Service Date Service Provider Modifiers Qty    86098215271 HC PT EVAL LOW COMPLEXITY 3 11/18/2020 Reyes, Carmela, PT GP 1          PT G-Codes  Outcome Measure Options: AM-PAC 6 Clicks Basic Mobility (PT)  AM-PAC 6 Clicks Score (PT): 8    Carmela Reyes, PT  11/18/2020

## 2020-11-18 NOTE — PROGRESS NOTES
"Chief complaint abdominal pain        Subjective     Seen and examined.  Continues to have abdominal pain still constipated.  No nausea no vomiting      Objective     Vital Signs  Visit Vitals  /61   Pulse 63   Temp 98.7 °F (37.1 °C) (Oral)   Resp 18   Ht 165.1 cm (65\")   Wt 87.6 kg (193 lb 2 oz)   SpO2 97%   BMI 32.14 kg/m²       Physical Exam:  Physical Exam   Constitutional:  oriented to person, place, and time. No distress.   HENT:   Head: Normocephalic and atraumatic.   Eyes: Conjunctivae and EOM are normal. Pupils are equal, round, and reactive to light.   Neck: No JVD present. No thyromegaly present.   Cardiovascular: Normal rate, regular rhythm, normal heart sounds and intact distal pulses. Exam reveals no gallop and no friction rub.   No murmur heard.  Pulmonary/Chest: Effort normal and breath sounds normal. No stridor. No respiratory distress.  has no wheezes.  has no rales.  exhibits no tenderness.   Abdominal: Soft. Bowel sounds are normal.  no distension and no mass. There is no tenderness. There is no rebound and no guarding. No hernia.   Musculoskeletal: Normal range of motion.   Lymphadenopathy:     no cervical adenopathy.   Neurological:  alert and oriented to person, place, and time. No cranial nerve deficit or sensory deficit. exhibits normal muscle tone.   Skin: No rash noted.  not diaphoretic.   Psychiatric:  normal mood and affect.   Vitals reviewed.    Physical Exam          Results Review:    CMP:  Lab Results   Component Value Date    BUN 15 11/18/2020    CREATININE 0.76 11/18/2020    EGFRIFNONA 75 11/18/2020     11/18/2020    K 4.0 11/18/2020     11/18/2020    CALCIUM 8.8 11/18/2020    ALBUMIN 3.90 11/17/2020    BILITOT 0.4 11/17/2020    ALKPHOS 103 11/17/2020    AST 17 11/17/2020    ALT 16 11/17/2020     CBC:  Lab Results   Component Value Date    WBC 9.60 11/18/2020    RBC 4.09 11/18/2020    HGB 13.4 11/18/2020    HCT 39.3 11/18/2020    MCV 96.2 11/18/2020    MCH 32.8 " 11/18/2020    [facility] 34.1 11/18/2020    RDW 13.7 11/18/2020     11/18/2020         Medication Review:     Scheduled Meds:amLODIPine, 10 mg, Oral, Nightly  atorvastatin, 10 mg, Oral, Nightly  busPIRone, 7.5 mg, Oral, TID  digoxin, 250 mcg, Oral, Daily  divalproex, 250 mg, Oral, BID  docusate sodium, 100 mg, Oral, BID  hydrALAZINE, 75 mg, Oral, TID  levETIRAcetam, 500 mg, Oral, BID  linaclotide, 145 mcg, Oral, Daily  lisinopril, 40 mg, Oral, Daily  metoprolol tartrate, 200 mg, Oral, Q12H  PEG-KCl-NaCl-NaSulf-Na Asc-C, 1,000 mL, Oral, Q12H  phenytoin extended, 300 mg, Oral, BID  risperiDONE, 0.25 mg, Oral, Q12H  sertraline, 50 mg, Oral, Daily  sodium chloride, 10 mL, Intravenous, Q12H  sodium chloride, 3 mL, Intravenous, Q12H  warfarin, 3 mg, Oral, Daily      Continuous Infusions:Pharmacy to dose warfarin,       PRN Meds:.•  acetaminophen **OR** acetaminophen **OR** acetaminophen  •  aluminum-magnesium hydroxide-simethicone  •  bisacodyl  •  magnesium hydroxide  •  melatonin  •  ondansetron **OR** ondansetron  •  Pharmacy to dose warfarin  •  [COMPLETED] Insert peripheral IV **AND** sodium chloride  •  sodium chloride  •  sodium chloride    Assessment/Plan   Abdominal pain  CT abdomen pelvis noted consistent with constipation however concern for slight wall thickening in the anorectal region  LFTs normal  Lipase normal  GI on board planning for colon prep and colonoscopy     Hypertension  Continue amlodipine hydralazine lisinopril and metoprolol    A. Fib  Continue digoxin metoprolol  And Coumadin for anticoagulation likely need to hold for colonoscopy  INR subtherapeutic    Ureterocele  Chronic  No evidence of infection per history however will obtain UA  Follow-up with urology as an outpatient    Seizure disorder  Continue Dilantin and Keppra    Psych  Continue home meds    DVT PUD prophylaxis    Plan as above        Chandra Selby MD  11/18/20  11:38 EST

## 2020-11-18 NOTE — PLAN OF CARE
Goal Outcome Evaluation:  Plan of Care Reviewed With: patient  Progress: improving  Outcome Summary: Patient with complaints of abdominal pain- no meds given per pt request. Abbott placed per MD d/t urinary retention. Sorbitol and linzess added. BM x1 thus far. Moviprep to start this evening for colonoscopy in AM. BP controlled. Pre-op covid swab completed. Will continue to monitor.

## 2020-11-19 ENCOUNTER — ON CAMPUS - OUTPATIENT (OUTPATIENT)
Dept: URBAN - METROPOLITAN AREA HOSPITAL 85 | Facility: HOSPITAL | Age: 70
End: 2020-11-19

## 2020-11-19 ENCOUNTER — ANESTHESIA (OUTPATIENT)
Dept: GASTROENTEROLOGY | Facility: HOSPITAL | Age: 70
End: 2020-11-19

## 2020-11-19 ENCOUNTER — ANESTHESIA EVENT (OUTPATIENT)
Dept: GASTROENTEROLOGY | Facility: HOSPITAL | Age: 70
End: 2020-11-19

## 2020-11-19 VITALS
HEIGHT: 65 IN | SYSTOLIC BLOOD PRESSURE: 173 MMHG | WEIGHT: 187.2 LBS | OXYGEN SATURATION: 97 % | DIASTOLIC BLOOD PRESSURE: 57 MMHG | HEART RATE: 71 BPM | RESPIRATION RATE: 20 BRPM | BODY MASS INDEX: 31.19 KG/M2 | TEMPERATURE: 97.9 F

## 2020-11-19 DIAGNOSIS — R10.32 LEFT LOWER QUADRANT PAIN: ICD-10-CM

## 2020-11-19 DIAGNOSIS — D12.3 BENIGN NEOPLASM OF TRANSVERSE COLON: ICD-10-CM

## 2020-11-19 DIAGNOSIS — R93.3 ABNORMAL FINDINGS ON DIAGNOSTIC IMAGING OF OTHER PARTS OF DI: ICD-10-CM

## 2020-11-19 LAB
ALBUMIN SERPL-MCNC: 3.5 G/DL (ref 3.5–5.2)
ALBUMIN/GLOB SERPL: 1.3 G/DL
ALP SERPL-CCNC: 99 U/L (ref 39–117)
ALT SERPL W P-5'-P-CCNC: 17 U/L (ref 1–33)
ANION GAP SERPL CALCULATED.3IONS-SCNC: 12 MMOL/L (ref 5–15)
AST SERPL-CCNC: 18 U/L (ref 1–32)
BASOPHILS # BLD AUTO: 0 10*3/MM3 (ref 0–0.2)
BASOPHILS NFR BLD AUTO: 0.3 % (ref 0–1.5)
BILIRUB SERPL-MCNC: 0.3 MG/DL (ref 0–1.2)
BUN SERPL-MCNC: 13 MG/DL (ref 8–23)
BUN/CREAT SERPL: 20 (ref 7–25)
CALCIUM SPEC-SCNC: 8.5 MG/DL (ref 8.6–10.5)
CHLORIDE SERPL-SCNC: 101 MMOL/L (ref 98–107)
CO2 SERPL-SCNC: 27 MMOL/L (ref 22–29)
CREAT SERPL-MCNC: 0.65 MG/DL (ref 0.57–1)
DEPRECATED RDW RBC AUTO: 45.1 FL (ref 37–54)
EOSINOPHIL # BLD AUTO: 0.1 10*3/MM3 (ref 0–0.4)
EOSINOPHIL NFR BLD AUTO: 1 % (ref 0.3–6.2)
ERYTHROCYTE [DISTWIDTH] IN BLOOD BY AUTOMATED COUNT: 13.5 % (ref 12.3–15.4)
GFR SERPL CREATININE-BSD FRML MDRD: 90 ML/MIN/1.73
GLOBULIN UR ELPH-MCNC: 2.7 GM/DL
GLUCOSE SERPL-MCNC: 110 MG/DL (ref 65–99)
HCT VFR BLD AUTO: 37.8 % (ref 34–46.6)
HGB BLD-MCNC: 12.8 G/DL (ref 12–15.9)
INR PPP: 1.52 (ref 2–3)
LYMPHOCYTES # BLD AUTO: 1.5 10*3/MM3 (ref 0.7–3.1)
LYMPHOCYTES NFR BLD AUTO: 15 % (ref 19.6–45.3)
MCH RBC QN AUTO: 32.9 PG (ref 26.6–33)
MCHC RBC AUTO-ENTMCNC: 33.9 G/DL (ref 31.5–35.7)
MCV RBC AUTO: 96.9 FL (ref 79–97)
MONOCYTES # BLD AUTO: 1.2 10*3/MM3 (ref 0.1–0.9)
MONOCYTES NFR BLD AUTO: 12.1 % (ref 5–12)
NEUTROPHILS NFR BLD AUTO: 7.3 10*3/MM3 (ref 1.7–7)
NEUTROPHILS NFR BLD AUTO: 71.6 % (ref 42.7–76)
NRBC BLD AUTO-RTO: 0 /100 WBC (ref 0–0.2)
PLATELET # BLD AUTO: 212 10*3/MM3 (ref 140–450)
PMV BLD AUTO: 8.6 FL (ref 6–12)
POTASSIUM SERPL-SCNC: 3.7 MMOL/L (ref 3.5–5.2)
PROT SERPL-MCNC: 6.2 G/DL (ref 6–8.5)
PROTHROMBIN TIME: 16.4 SECONDS (ref 19.4–28.5)
RBC # BLD AUTO: 3.89 10*6/MM3 (ref 3.77–5.28)
SODIUM SERPL-SCNC: 140 MMOL/L (ref 136–145)
WBC # BLD AUTO: 10.2 10*3/MM3 (ref 3.4–10.8)

## 2020-11-19 PROCEDURE — 63710000001 ATORVASTATIN 10 MG TABLET: Performed by: INTERNAL MEDICINE

## 2020-11-19 PROCEDURE — 85025 COMPLETE CBC W/AUTO DIFF WBC: CPT | Performed by: PHYSICIAN ASSISTANT

## 2020-11-19 PROCEDURE — A9270 NON-COVERED ITEM OR SERVICE: HCPCS | Performed by: INTERNAL MEDICINE

## 2020-11-19 PROCEDURE — 63710000001 AMLODIPINE 5 MG TABLET: Performed by: INTERNAL MEDICINE

## 2020-11-19 PROCEDURE — 63710000001 RISPERIDONE 0.25 MG TABLET: Performed by: INTERNAL MEDICINE

## 2020-11-19 PROCEDURE — 63710000001 DIGOXIN 250 MCG TABLET: Performed by: INTERNAL MEDICINE

## 2020-11-19 PROCEDURE — 63710000001 LEVETIRACETAM 500 MG TABLET: Performed by: INTERNAL MEDICINE

## 2020-11-19 PROCEDURE — 88305 TISSUE EXAM BY PATHOLOGIST: CPT | Performed by: INTERNAL MEDICINE

## 2020-11-19 PROCEDURE — 25010000002 PROPOFOL 10 MG/ML EMULSION: Performed by: ANESTHESIOLOGY

## 2020-11-19 PROCEDURE — 99217 PR OBSERVATION CARE DISCHARGE MANAGEMENT: CPT | Performed by: HOSPITALIST

## 2020-11-19 PROCEDURE — 85610 PROTHROMBIN TIME: CPT | Performed by: PHYSICIAN ASSISTANT

## 2020-11-19 PROCEDURE — 63710000001 DIVALPROEX 250 MG TABLET DELAYED-RELEASE: Performed by: INTERNAL MEDICINE

## 2020-11-19 PROCEDURE — 63710000001 HYDRALAZINE 25 MG TABLET: Performed by: INTERNAL MEDICINE

## 2020-11-19 PROCEDURE — 63710000001 METOPROLOL TARTRATE 50 MG TABLET: Performed by: INTERNAL MEDICINE

## 2020-11-19 PROCEDURE — 63710000001 DOCUSATE SODIUM 100 MG CAPSULE: Performed by: INTERNAL MEDICINE

## 2020-11-19 PROCEDURE — G0378 HOSPITAL OBSERVATION PER HR: HCPCS

## 2020-11-19 PROCEDURE — 63710000001 BUSPIRONE 15 MG TABLET: Performed by: INTERNAL MEDICINE

## 2020-11-19 PROCEDURE — 63710000001 WARFARIN 3 MG TABLET: Performed by: INTERNAL MEDICINE

## 2020-11-19 PROCEDURE — 45385 COLONOSCOPY W/LESION REMOVAL: CPT | Performed by: INTERNAL MEDICINE

## 2020-11-19 PROCEDURE — 80053 COMPREHEN METABOLIC PANEL: CPT | Performed by: NURSE PRACTITIONER

## 2020-11-19 PROCEDURE — 63710000001 PHENYTOIN 100 MG CAPSULE: Performed by: INTERNAL MEDICINE

## 2020-11-19 RX ORDER — SORBITOL SOLUTION 70 %
50 SOLUTION, ORAL MISCELLANEOUS ONCE
Status: COMPLETED | OUTPATIENT
Start: 2020-11-19 | End: 2020-11-19

## 2020-11-19 RX ORDER — LIDOCAINE HYDROCHLORIDE 10 MG/ML
0.5 INJECTION, SOLUTION INFILTRATION; PERINEURAL ONCE AS NEEDED
Status: DISCONTINUED | OUTPATIENT
Start: 2020-11-19 | End: 2020-11-19 | Stop reason: HOSPADM

## 2020-11-19 RX ORDER — SODIUM CHLORIDE 9 MG/ML
1000 INJECTION, SOLUTION INTRAVENOUS CONTINUOUS
Status: DISCONTINUED | OUTPATIENT
Start: 2020-11-19 | End: 2020-11-19 | Stop reason: HOSPADM

## 2020-11-19 RX ORDER — LEVOFLOXACIN 500 MG/1
500 TABLET, FILM COATED ORAL DAILY
Qty: 7 TABLET | Refills: 0 | Status: SHIPPED | OUTPATIENT
Start: 2020-11-19 | End: 2020-11-26

## 2020-11-19 RX ORDER — PROPOFOL 10 MG/ML
VIAL (ML) INTRAVENOUS AS NEEDED
Status: DISCONTINUED | OUTPATIENT
Start: 2020-11-19 | End: 2020-11-19 | Stop reason: SURG

## 2020-11-19 RX ORDER — PHENYLEPHRINE HCL IN 0.9% NACL 0.5 MG/5ML
SYRINGE (ML) INTRAVENOUS AS NEEDED
Status: DISCONTINUED | OUTPATIENT
Start: 2020-11-19 | End: 2020-11-19 | Stop reason: SURG

## 2020-11-19 RX ORDER — SODIUM CHLORIDE 0.9 % (FLUSH) 0.9 %
10 SYRINGE (ML) INJECTION AS NEEDED
Status: DISCONTINUED | OUTPATIENT
Start: 2020-11-19 | End: 2020-11-19 | Stop reason: HOSPADM

## 2020-11-19 RX ORDER — LIDOCAINE HYDROCHLORIDE 10 MG/ML
INJECTION, SOLUTION EPIDURAL; INFILTRATION; INTRACAUDAL; PERINEURAL AS NEEDED
Status: DISCONTINUED | OUTPATIENT
Start: 2020-11-19 | End: 2020-11-19 | Stop reason: SURG

## 2020-11-19 RX ADMIN — ATORVASTATIN CALCIUM 10 MG: 10 TABLET, FILM COATED ORAL at 21:05

## 2020-11-19 RX ADMIN — SERTRALINE HYDROCHLORIDE 50 MG: 50 TABLET ORAL at 08:50

## 2020-11-19 RX ADMIN — PHENYTOIN SODIUM 300 MG: 100 CAPSULE ORAL at 08:49

## 2020-11-19 RX ADMIN — HYDRALAZINE HYDROCHLORIDE 75 MG: 25 TABLET, FILM COATED ORAL at 21:05

## 2020-11-19 RX ADMIN — LINACLOTIDE 145 MCG: 145 CAPSULE, GELATIN COATED ORAL at 08:50

## 2020-11-19 RX ADMIN — Medication 10 ML: at 21:03

## 2020-11-19 RX ADMIN — POLYETHYLENE GLYCOL 3350, SODIUM SULFATE, SODIUM CHLORIDE, POTASSIUM CHLORIDE, ASCORBIC ACID, SODIUM ASCORBATE 1000 ML: KIT at 04:18

## 2020-11-19 RX ADMIN — DIVALPROEX SODIUM 250 MG: 250 TABLET, DELAYED RELEASE ORAL at 21:04

## 2020-11-19 RX ADMIN — SORBITOL SOLUTION (BULK) 50 ML: 70 SOLUTION at 08:53

## 2020-11-19 RX ADMIN — HYDRALAZINE HYDROCHLORIDE 75 MG: 25 TABLET, FILM COATED ORAL at 17:29

## 2020-11-19 RX ADMIN — BUSPIRONE HYDROCHLORIDE 7.5 MG: 15 TABLET ORAL at 17:29

## 2020-11-19 RX ADMIN — BUSPIRONE HYDROCHLORIDE 7.5 MG: 15 TABLET ORAL at 21:04

## 2020-11-19 RX ADMIN — LISINOPRIL 40 MG: 20 TABLET ORAL at 08:50

## 2020-11-19 RX ADMIN — BUSPIRONE HYDROCHLORIDE 7.5 MG: 15 TABLET ORAL at 08:50

## 2020-11-19 RX ADMIN — WARFARIN 3 MG: 3 TABLET ORAL at 17:30

## 2020-11-19 RX ADMIN — DOCUSATE SODIUM 100 MG: 100 CAPSULE, LIQUID FILLED ORAL at 08:50

## 2020-11-19 RX ADMIN — METOPROLOL TARTRATE 200 MG: 50 TABLET, FILM COATED ORAL at 21:04

## 2020-11-19 RX ADMIN — DIVALPROEX SODIUM 250 MG: 250 TABLET, DELAYED RELEASE ORAL at 08:50

## 2020-11-19 RX ADMIN — LIDOCAINE HYDROCHLORIDE 40 MG: 10 INJECTION, SOLUTION EPIDURAL; INFILTRATION; INTRACAUDAL; PERINEURAL at 13:35

## 2020-11-19 RX ADMIN — PROPOFOL 50 MG: 10 INJECTION, EMULSION INTRAVENOUS at 13:33

## 2020-11-19 RX ADMIN — PROPOFOL 50 MG: 10 INJECTION, EMULSION INTRAVENOUS at 13:35

## 2020-11-19 RX ADMIN — PHENYTOIN SODIUM 300 MG: 100 CAPSULE ORAL at 21:04

## 2020-11-19 RX ADMIN — HYDRALAZINE HYDROCHLORIDE 75 MG: 25 TABLET, FILM COATED ORAL at 08:52

## 2020-11-19 RX ADMIN — DOCUSATE SODIUM 100 MG: 100 CAPSULE, LIQUID FILLED ORAL at 21:04

## 2020-11-19 RX ADMIN — AMLODIPINE BESYLATE 10 MG: 5 TABLET ORAL at 21:04

## 2020-11-19 RX ADMIN — DIGOXIN 250 MCG: 250 TABLET ORAL at 17:34

## 2020-11-19 RX ADMIN — LEVETIRACETAM 500 MG: 500 TABLET ORAL at 08:50

## 2020-11-19 RX ADMIN — RISPERIDONE 0.25 MG: 0.25 TABLET ORAL at 21:05

## 2020-11-19 RX ADMIN — PHENYLEPHRINE HYDROCHLORIDE 100 MCG: 10 INJECTION INTRAVENOUS at 13:40

## 2020-11-19 RX ADMIN — RISPERIDONE 0.25 MG: 0.25 TABLET ORAL at 08:50

## 2020-11-19 RX ADMIN — LEVETIRACETAM 500 MG: 500 TABLET ORAL at 21:04

## 2020-11-19 RX ADMIN — Medication 10 ML: at 08:49

## 2020-11-19 RX ADMIN — METOPROLOL TARTRATE 200 MG: 50 TABLET, FILM COATED ORAL at 08:50

## 2020-11-19 NOTE — PROGRESS NOTES
"Pharmacy dosing service  Anticoagulant  Warfarin     Subjective:    Jeremiah Henson is a 70 y.o.female being continued on warfarin for atrial fibrillation.    INR Goal: 2 - 3  Home medication?: Yes, warfarin 3 mg PO every day at 1800  Bridge Therapy Present?:  No  Interacting Medications Evaluation (New/Present/Discontinued): phenytoin, divalproex, sertraline, digoxin  Additional Contributing Factors:       Assessment/Plan:    INR subtherapeutic on admission. Home dose resumed 11/18 and INR is increasing appropriately. Will continue home dose today.     Continue to monitor and adjust based on INR.         Date 11/17 11/18 11/19         INR 1.13 1.11 1.52         Dose 4mg once 3mg  3mg             Objective:  [Ht: 165.1 cm (65\"); Wt: 84.9 kg (187 lb 3.2 oz); BMI: Body mass index is 31.15 kg/m².]    Lab Results   Component Value Date    ALBUMIN 3.50 11/19/2020     Lab Results   Component Value Date    INR 1.52 (L) 11/19/2020    INR 1.11 (L) 11/18/2020    INR 1.13 (L) 11/17/2020    PROTIME 16.4 (L) 11/19/2020    PROTIME 12.2 (L) 11/18/2020    PROTIME 12.4 (L) 11/17/2020     Lab Results   Component Value Date    HGB 12.8 11/19/2020    HGB 13.4 11/18/2020    HGB 19.6 (H) 11/17/2020     Lab Results   Component Value Date    HCT 37.8 11/19/2020    HCT 39.3 11/18/2020    HCT 58.6 (H) 11/17/2020       Yue Diaz RPH  11/19/20 10:41 EST         "

## 2020-11-19 NOTE — ANESTHESIA POSTPROCEDURE EVALUATION
Patient: Jeremiah Henson    Procedure Summary     Date: 11/19/20 Room / Location: [facility] ENDOSCOPY 1 / [facility] ENDOSCOPY    Anesthesia Start: 1328 Anesthesia Stop: 1355    Procedure: COLONOSCOPY WITH POLYPECTOMY X 2 (N/A ) Diagnosis:       Left lower quadrant abdominal pain      (Left lower quadrant abdominal pain [R10.32])    Surgeon: Abelardo Garay MD Provider: Evonne Green MD    Anesthesia Type: MAC ASA Status: 3          Anesthesia Type: MAC    Vitals  No vitals data found for the desired time range.          Post Anesthesia Care and Evaluation    Patient location during evaluation: PACU  Patient participation: complete - patient participated  Level of consciousness: awake  Pain score: 0 (See nurse's notes for pain score)  Pain management: adequate  Airway patency: patent  Anesthetic complications: No anesthetic complications  PONV Status: none  Cardiovascular status: acceptable  Respiratory status: acceptable  Hydration status: acceptable    Comments: Patient seen and examined postoperatively; vital signs stable; SpO2 greater than or equal to 90%; cardiopulmonary status stable; nausea/vomiting adequately controlled; pain adequately controlled; no apparent anesthesia complications; patient discharged from anesthesia care when discharge criteria were met

## 2020-11-19 NOTE — OP NOTE
COLONOSCOPY Procedure Report    Patient Name:  Jeremiah Henson  YOB: 1950    Date of Surgery:  11/19/2020     Pre-Op Diagnosis:  Left lower quadrant abdominal pain [R10.32]    Post-Op Diagnosis:  1.  2 small transverse colon polyps removed with cold snare polypectomy  2.  Otherwise normal examination       Procedure/CPT® Codes:      Procedure(s):  COLONOSCOPY WITH POLYPECTOMY X 2    Staff:  Surgeon(s):  Abelardo Garay MD      Anesthesia: Monitored Anesthesia Care    Description of Procedure:  A description of the procedure as well as risks, benefits and alternative methods were explained to the patient who voiced understanding and signed the corresponding consent form. A physical exam was performed and vital signs were monitored throughout the procedure.    After informed consent was obtained the patient was placed in the left lateral decubitus position and sedated as above.  Digital rectal examination was performed and was normal.  The Olympus video colonoscope was inserted into the rectum and advanced to the cecum without difficulty.  A careful examination of the colon was performed as the colonoscope was slowly withdrawn.  The bowel prep was excellent.  The cecum and appendiceal orifice were identified. The scope was then retroflexed and the distal rectum was visualized. The procedure was not difficult and there were no immediate complications.  There was no blood loss.    Findings:   2 small transverse colon polyps removed with cold snare polypectomy.  The polyps measured approximately 6 mm in diameter.  There were no mass lesions noted.  There were no inflammatory changes in the rectum sigmoid or descending colon.  There is nothing seen to explain the abnormal CT findings.    Impression:  Small colon polyps    Recommendations:  Regular diet.  Okay for discharge home.  We will see as needed.      Abelardo Garay MD     Date: 11/19/2020    Time: 13:54 EST

## 2020-11-19 NOTE — ANESTHESIA PREPROCEDURE EVALUATION
Anesthesia Evaluation     Patient summary reviewed and Nursing notes reviewed   NPO Solid Status: > 8 hours  NPO Liquid Status: > 8 hours           Airway   Mallampati: II  TM distance: >3 FB  Neck ROM: full  No difficulty expected  Dental - normal exam     Pulmonary - normal exam   (+) sleep apnea,   Cardiovascular - normal exam    (+) hypertension, dysrhythmias Atrial Fib,       Neuro/Psych  (+) seizures, CVA, psychiatric history Anxiety and Depression, poor historian.,     GI/Hepatic/Renal/Endo - negative ROS     Musculoskeletal (-) negative ROS    Abdominal  - normal exam    Bowel sounds: normal.   Substance History - negative use     OB/GYN negative ob/gyn ROS         Other                        Anesthesia Plan    ASA 3     MAC     intravenous induction     Anesthetic plan, all risks, benefits, and alternatives have been provided, discussed and informed consent has been obtained with: patient.

## 2020-11-19 NOTE — DISCHARGE SUMMARY
Date of Admission: 11/17/2020    Date of Discharge:  11/19/2020    Length of stay:  LOS: 0 days   Hospital Course  Chief Complaint:   Chief Complaint   Patient presents with   • Abdominal Pain      Abdominal pain     Ms. Henson is a 70 y.o.  presents to [facility] complaining of diffusely located abdominal pain that is associated with constipation and distention. Patient is unable to say when this started and is unable to describe the pain. She does not notice a pattern to the pain. She does report that having a bowel movement improves the pain but is unable to state the last time she had a BM. Patient denies any fever, chills, nausea/vomiting, melena or bright red blood per rectum. She is a poor historian and is unable to provide any further details.     Physical Exam   Constitutional:  oriented to person, place, and time. No distress.   HENT:   Head: Normocephalic and atraumatic.   Eyes: Conjunctivae and EOM are normal. Pupils are equal, round, and reactive to light.   Neck: No JVD present. No thyromegaly present.   Cardiovascular: Normal rate, regular rhythm, normal heart sounds and intact distal pulses. Exam reveals no gallop and no friction rub.   No murmur heard.  Pulmonary/Chest: Effort normal and breath sounds normal. No stridor. No respiratory distress.  has no wheezes.  has no rales.  exhibits no tenderness.   Abdominal: Soft. Bowel sounds are normal.  no distension and no mass. There is no tenderness. There is no rebound and no guarding. No hernia.   Musculoskeletal: Normal range of motion.   Lymphadenopathy:     no cervical adenopathy.   Neurological:  alert and oriented to person, place, and time. No cranial nerve deficit or sensory deficit. exhibits normal muscle tone.   Skin: No rash noted.  not diaphoretic.   Psychiatric:  normal mood and affect.   Vitals reviewed.      Hospital course and problem list    Abdominal pain  Resolved, had multiple bowel movements with colonoscopy prep  CT  "abdomen pelvis noted consistent with constipation however concern for slight wall thickening in the anorectal region  LFTs normal  Lipase normal  GI on board planning for colonoscopy today     Hypertension  Continue amlodipine hydralazine lisinopril and metoprolol    UTI?  We will opt to treat with a course of Levaquin  However she has a chronic ureterocele and said \"the urine test always says I have an infection.  She has no dysuria no polyuria however in the light of abdominal pain will opt to treat with an antibiotic and have her follow-up with urology as an outpatient     A. Fib  Continue digoxin metoprolol  And Coumadin for anticoagulation likely need to hold for colonoscopy  INR subtherapeutic     Ureterocele  Chronic  Follow-up with urology as an outpatient     Seizure disorder  Continue Dilantin and Keppra     Psych  Continue home meds     DVT PUD prophylaxis     Plan discharge home in stable condition with follow-up with PCP urology and GI as an outpatient.         Pertinent Test Results:     Lab Results (last 48 hours)     Procedure Component Value Units Date/Time    Comprehensive Metabolic Panel [[id number]]  (Abnormal) Collected: 11/19/20 0326    Specimen: Blood Updated: 11/19/20 0459     Glucose 110 mg/dL      BUN 13 mg/dL      Creatinine 0.65 mg/dL      Sodium 140 mmol/L      Potassium 3.7 mmol/L      Chloride 101 mmol/L      CO2 27.0 mmol/L      Calcium 8.5 mg/dL      Total Protein 6.2 g/dL      Albumin 3.50 g/dL      ALT (SGPT) 17 U/L      AST (SGOT) 18 U/L      Alkaline Phosphatase 99 U/L      Total Bilirubin 0.3 mg/dL      eGFR Non African Amer 90 mL/min/1.73      Globulin 2.7 gm/dL      A/G Ratio 1.3 g/dL      BUN/Creatinine Ratio 20.0     Anion Gap 12.0 mmol/L     Narrative:      GFR Normal >60  Chronic Kidney Disease <60  Kidney Failure <15      Protime-INR [[id number]]  (Abnormal) Collected: 11/19/20 0326    Specimen: Blood Updated: 11/19/20 0443     Protime 16.4 Seconds      INR 1.52    CBC & " Differential [[id number]]  (Abnormal) Collected: 11/19/20 0326    Specimen: Blood Updated: 11/19/20 0435    Narrative:      The following orders were created for panel order CBC & Differential.  Procedure                               Abnormality         Status                     ---------                               -----------         ------                     CBC Auto Differential[[id number]]        Abnormal            Final result                 Please view results for these tests on the individual orders.    CBC Auto Differential [[id number]]  (Abnormal) Collected: 11/19/20 0326    Specimen: Blood Updated: 11/19/20 0435     WBC 10.20 10*3/mm3      RBC 3.89 10*6/mm3      Hemoglobin 12.8 g/dL      Hematocrit 37.8 %      MCV 96.9 fL      MCH 32.9 pg      MCHC 33.9 g/dL      RDW 13.5 %      RDW-SD 45.1 fl      MPV 8.6 fL      Platelets 212 10*3/mm3      Neutrophil % 71.6 %      Lymphocyte % 15.0 %      Monocyte % 12.1 %      Eosinophil % 1.0 %      Basophil % 0.3 %      Neutrophils, Absolute 7.30 10*3/mm3      Lymphocytes, Absolute 1.50 10*3/mm3      Monocytes, Absolute 1.20 10*3/mm3      Eosinophils, Absolute 0.10 10*3/mm3      Basophils, Absolute 0.00 10*3/mm3      nRBC 0.0 /100 WBC     Urinalysis With Microscopic If Indicated (No Culture) - Urine, Clean Catch [[id number]]  (Abnormal) Collected: 11/18/20 1339    Specimen: Urine, Clean Catch Updated: 11/18/20 1452     Color, UA Yellow     Appearance, UA Cloudy     Comment: Result checked         pH, UA 6.5     Specific Gravity, UA 1.035     Glucose, UA Negative     Ketones, UA Negative     Bilirubin, UA Negative     Blood, UA Negative     Protein,  mg/dL (2+)     Leuk Esterase, UA Small (1+)     Nitrite, UA Positive     Urobilinogen, UA 0.2 E.U./dL    Urinalysis, Microscopic Only - Urine, Clean Catch [[id number]]  (Abnormal) Collected: 11/18/20 1339    Specimen: Urine, Clean Catch Updated: 11/18/20 1452     RBC, UA 0-2 /HPF      WBC, UA Too Numerous to Count  /HPF      Bacteria, UA 1+ /HPF      Squamous Epithelial Cells, UA None Seen /HPF      Hyaline Casts, UA 0-2 /LPF      Methodology Manual Light Microscopy    COVID PRE-OP / PRE-PROCEDURE SCREENING ORDER (NO ISOLATION) - Swab, Nasopharynx [[id number]]  (Normal) Collected: 11/18/20 0943    Specimen: Swab from Nasopharynx Updated: 11/18/20 1052    Narrative:      The following orders were created for panel order COVID PRE-OP / PRE-PROCEDURE SCREENING ORDER (NO ISOLATION) - Swab, Nasopharynx.  Procedure                               Abnormality         Status                     ---------                               -----------         ------                     COVID-19,CEPHEID,COR/KAMI...[[id number]]  Normal              Final result                 Please view results for these tests on the individual orders.    COVID-19,CEPHEID,COR/[facility]/PAD IN-HOUSE(OR EMERGENT/ADD-ON),NP SWAB IN TRANSPORT MEDIA 3-4 HR TAT - Swab, Nasopharynx [[id number]]  (Normal) Collected: 11/18/20 0943    Specimen: Swab from Nasopharynx Updated: 11/18/20 1052     COVID19 Not Detected    Narrative:      Fact sheet for providers: https://www.fda.gov/media/080960/download     Fact sheet for patients: https://www.fda.gov/media/856217/download    CBC & Differential [[id number]]  (Abnormal) Collected: 11/18/20 0537    Specimen: Blood Updated: 11/18/20 0618    Narrative:      The following orders were created for panel order CBC & Differential.  Procedure                               Abnormality         Status                     ---------                               -----------         ------                     CBC Auto Differential[[id number]]        Abnormal            Final result                 Please view results for these tests on the individual orders.    CBC Auto Differential [[id number]]  (Abnormal) Collected: 11/18/20 0537    Specimen: Blood Updated: 11/18/20 0618     WBC 9.60 10*3/mm3      Comment: Result checked         RBC 4.09 10*6/mm3       Hemoglobin 13.4 g/dL      Hematocrit 39.3 %      MCV 96.2 fL      MCH 32.8 pg      MCHC 34.1 g/dL      RDW 13.7 %      RDW-SD 45.5 fl      MPV 8.5 fL      Platelets 236 10*3/mm3      Neutrophil % 76.6 %      Lymphocyte % 10.0 %      Monocyte % 12.6 %      Eosinophil % 0.4 %      Basophil % 0.4 %      Neutrophils, Absolute 7.40 10*3/mm3      Lymphocytes, Absolute 1.00 10*3/mm3      Monocytes, Absolute 1.20 10*3/mm3      Eosinophils, Absolute 0.00 10*3/mm3      Basophils, Absolute 0.00 10*3/mm3      nRBC 0.3 /100 WBC     TSH [[id number]]  (Normal) Collected: 11/18/20 0410    Specimen: Blood Updated: 11/18/20 0530     TSH 1.070 uIU/mL     Digoxin Level [[id number]]  (Abnormal) Collected: 11/18/20 0410    Specimen: Blood Updated: 11/18/20 0526     Digoxin 0.40 ng/mL     Basic Metabolic Panel [[id number]]  (Abnormal) Collected: 11/18/20 0410    Specimen: Blood Updated: 11/18/20 0524     Glucose 117 mg/dL      BUN 15 mg/dL      Creatinine 0.76 mg/dL      Sodium 137 mmol/L      Potassium 4.0 mmol/L      Chloride 100 mmol/L      CO2 26.0 mmol/L      Calcium 8.8 mg/dL      eGFR Non African Amer 75 mL/min/1.73      BUN/Creatinine Ratio 19.7     Anion Gap 11.0 mmol/L     Narrative:      GFR Normal >60  Chronic Kidney Disease <60  Kidney Failure <15      Magnesium [[id number]]  (Normal) Collected: 11/18/20 0410    Specimen: Blood Updated: 11/18/20 0524     Magnesium 1.7 mg/dL     Phosphorus [[id number]]  (Normal) Collected: 11/18/20 0410    Specimen: Blood Updated: 11/18/20 0524     Phosphorus 3.7 mg/dL     Protime-INR [[id number]]  (Abnormal) Collected: 11/18/20 0410    Specimen: Blood Updated: 11/18/20 0509     Protime 12.2 Seconds      INR 1.11    Extra Tubes [[id number]] Collected: 11/17/20 1569    Specimen: Blood, Venous Line Updated: 11/17/20 5661    Narrative:      The following orders were created for panel order Extra Tubes.  Procedure                               Abnormality         Status                     ---------                                -----------         ------                     Gold Top - SST[[id number]]                                   Final result               [place] Blood Culture [place]...[[id number]]                      Final result                 Please view results for these tests on the individual orders.    Gold Top - SST [[id number]] Collected: 11/17/20 1754    Specimen: Blood Updated: 11/17/20 2145     Extra Tube Hold for add-ons.     Comment: Auto resulted.       [place] Blood Culture Bottle Set [[id number]] Collected: 11/17/20 2004    Specimen: Blood from Arm, Left Updated: 11/17/20 2115     Extra Tube Hold for add-ons.     Comment: Auto resulted.       Comprehensive Metabolic Panel [[id number]]  (Abnormal) Collected: 11/17/20 1943    Specimen: Blood Updated: 11/17/20 2031     Glucose 137 mg/dL      BUN 14 mg/dL      Creatinine 0.66 mg/dL      Sodium 138 mmol/L      Potassium 3.9 mmol/L      Chloride 100 mmol/L      CO2 28.0 mmol/L      Calcium 8.8 mg/dL      Total Protein 6.8 g/dL      Albumin 3.90 g/dL      ALT (SGPT) 16 U/L      AST (SGOT) 17 U/L      Alkaline Phosphatase 103 U/L      Total Bilirubin 0.4 mg/dL      eGFR Non African Amer 88 mL/min/1.73      Globulin 2.9 gm/dL      A/G Ratio 1.3 g/dL      BUN/Creatinine Ratio 21.2     Anion Gap 10.0 mmol/L     Narrative:      GFR Normal >60  Chronic Kidney Disease <60  Kidney Failure <15      Lipase [[id number]]  (Normal) Collected: 11/17/20 1943    Specimen: Blood Updated: 11/17/20 2031     Lipase 57 U/L     Protime-INR [[id number]]  (Abnormal) Collected: 11/17/20 1943    Specimen: Blood Updated: 11/17/20 2013     Protime 12.4 Seconds      INR 1.13    aPTT [[id number]]  (Normal) Collected: 11/17/20 1943    Specimen: Blood Updated: 11/17/20 2013     PTT 29.2 seconds     CBC & Differential [[id number]]  (Abnormal) Collected: 11/17/20 1943    Specimen: Blood Updated: 11/17/20 2005    Narrative:      The following orders were created for panel order CBC &  Differential.  Procedure                               Abnormality         Status                     ---------                               -----------         ------                     CBC Auto Differential[[id number]]        Abnormal            Final result                 Please view results for these tests on the individual orders.    CBC Auto Differential [[id number]]  (Abnormal) Collected: 11/17/20 1943    Specimen: Blood Updated: 11/17/20 2005     WBC 4.70 10*3/mm3      RBC 6.07 10*6/mm3      Hemoglobin 19.6 g/dL      Hematocrit 58.6 %      MCV 96.5 fL      MCH 32.4 pg      MCHC 33.5 g/dL      RDW 13.7 %      RDW-SD 45.5 fl      MPV 8.3 fL      Platelets 91 10*3/mm3      Neutrophil % 78.8 %      Lymphocyte % 11.4 %      Monocyte % 9.1 %      Eosinophil % 0.6 %      Basophil % 0.1 %      Neutrophils, Absolute 3.70 10*3/mm3      Lymphocytes, Absolute 0.50 10*3/mm3      Monocytes, Absolute 0.40 10*3/mm3      Eosinophils, Absolute 0.00 10*3/mm3      Basophils, Absolute 0.00 10*3/mm3      nRBC 0.0 /100 WBC                  Imaging Results (All)     Procedure Component Value Units Date/Time    CT Abdomen Pelvis With Contrast [[id number]] Collected: 11/17/20 2104     Updated: 11/17/20 2113    Narrative:      CT ABDOMEN PELVIS W CONTRAST-     Date of Exam: 11/17/2020 8:57 PM     Indication: Abdominal pain and distention.     Comparison: None available.     Technique: Contiguous axial CT images were obtained from the lung bases  to the superior iliac crests without contrast.  Following uneventful  administration of 100 mL of Isovue-370 intravenous and oral contrast,  contiguous axial images obtained from lung bases to the pubic symphysis.  Sagittal and coronal reconstructions were performed.  Automated exposure  control and iterative reconstruction methods were used.     FINDINGS:  There is mild bilateral basilar atelectasis. There is calcific  atherosclerosis of the coronary arteries.     The gallbladder, liver,  "bilateral adrenal glands, pancreas and spleen  are normal. There are benign cysts of the left kidney. The left  collecting system is normal.     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 There appears to be neck  topic ureterocele with marked dilatation of the upper pole calyx which  appears blown out the posterior upper kidney. The lower pole collecting  system is nondilated.     There is a densely calcified mass of the fundus of the uterus.     There is a large amount of stool throughout the colon with stool to the  cecum. There is slight thickening at the anorectal region. Correlation  with possible mass recommended.       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  2.Large amount of stool throughout the dilated colon including stool  throughout the transverse and ascending portions of the colon to the  cecum. There is slight wall thickening at the anorectal region. Clinical  and physical exam correlation required to ensure there is no underlying  mass.     Electronically Signed By-Chuckie Garcia MD On:11/17/2020 9:10 PM  This report was finalized on [id number] by  Chuckie Garcia MD.            Vital Signs  Visit Vitals  BP (!) 212/67 (BP Location: Left arm, Patient Position: Lying)   Pulse 70   Temp 97.8 °F (36.6 °C) (Axillary)   Resp 18   Ht 165.1 cm (65\")   Wt 84.9 kg (187 lb 3.2 oz)   SpO2 97%   BMI 31.15 kg/m²       Physical Exam:  Physical Exam      Discharge Medications     Discharge Medications      New Medications      Instructions Start Date   levoFLOXacin 500 MG tablet  Commonly known as: Levaquin   500 mg, Oral, Daily         Continue These Medications      Instructions Start Date   acetaminophen 325 MG tablet  Commonly known as: TYLENOL   650 mg, Oral, Every 8 Hours PRN      amLODIPine 10 MG tablet  Commonly known as: NORVASC   10 mg, Oral, Nightly    "   atorvastatin 10 MG tablet  Commonly known as: LIPITOR   10 mg, Oral, Nightly      baclofen 10 MG tablet  Commonly known as: LIORESAL   15 mg, 3 Times Daily      busPIRone 7.5 MG tablet  Commonly known as: BUSPAR   7.5 mg, Oral, 3 Times Daily      Cholecalciferol 50 MCG (2000 UT) tablet   2,000 Units, Oral, Daily      ciprofloxacin 250 MG tablet  Commonly known as: CIPRO   250 mg, Oral, 2 Times Daily      Digox 250 MCG tablet  Generic drug: digoxin   250 mcg, Oral, Daily Digoxin      divalproex 125 MG DR tablet  Commonly known as: DEPAKOTE   250 mg, Oral, 2 Times Daily      docusate sodium 100 MG capsule  Commonly known as: COLACE   100 mg, Oral, 2 Times Daily      hydrALAZINE 50 MG tablet  Commonly known as: APRESOLINE   50 mg, Oral, 3 Times Daily, Total dose = 75 mg; give with 25 mg tabs      hydrALAZINE 25 MG tablet  Commonly known as: APRESOLINE   25 mg, Oral, 3 Times Daily, Total dose = 75 mg; give with 50 mg tabs      levETIRAcetam 500 MG tablet  Commonly known as: KEPPRA   500 mg, Oral, 2 Times Daily      lisinopril 40 MG tablet  Commonly known as: PRINIVIL,ZESTRIL   40 mg, Oral, Daily      Melatonin 3 MG tablet   3 mg, Oral, Nightly      metoprolol tartrate 100 MG tablet  Commonly known as: LOPRESSOR   200 mg, Oral, 2 Times Daily      MiraLax 17 g packet  Generic drug: polyethylene glycol   17 g, Oral, 2 Times Daily      Omega-3 1000 MG capsule   1 capsule, Oral, 2 times daily      phenytoin extended 300 MG ER capsule  Commonly known as: DILANTIN   300 mg, Oral, 2 Times Daily      risperiDONE 0.25 MG tablet  Commonly known as: risperDAL   0.25 mg, Oral, 2 Times Daily      sertraline 50 MG tablet  Commonly known as: ZOLOFT   50 mg, Oral, Daily      warfarin 3 MG tablet  Commonly known as: COUMADIN   3 mg, Oral, Daily Warfarin             Discharge Diet:   Diet Instructions     Diet: Regular      Discharge Diet: Regular          Activity at Discharge:   Activity Instructions     Activity as Tolerated             Follow-up Appointments  Future Appointments   Date Time Provider Department Center   7/29/2021  3:45 PM Seipel, Joseph F, MD [facility]     Additional Instructions for the Follow-ups that You Need to Schedule     Discharge Follow-up with PCP   As directed       Currently Documented PCP:    Anna Barkley MD    PCP Phone Number:    [phone]     Follow Up Details: one week         Discharge Follow-up with Specialty: gi one week, urology one week   As directed      Specialty: gi one week, urology one week                   Chandra Selby MD  11/19/20  09:36 EST    Time: Discharge 38 min

## 2020-11-19 NOTE — PLAN OF CARE
Goal Outcome Evaluation:  Plan of Care Reviewed With: patient  Progress: no change  Outcome Summary: Pt having multiple loose stools overnight. Pt awaiting colonoscopy today.

## 2020-11-20 LAB
LAB AP CASE REPORT: NORMAL
PATH REPORT.FINAL DX SPEC: NORMAL
PATH REPORT.GROSS SPEC: NORMAL

## 2020-11-20 NOTE — PROGRESS NOTES
Discharge Planning Assessment   [place]     Patient Name: Jeremiah Henson  MRN: [id number]  Today's Date: 11/20/2020    Admit Date: 11/17/2020          Plan    Final Discharge Disposition Code  04 - intermediate care facility    Final Note  to long term care at [facility] and rehab       Carol naegele rn  Case management  Office number [phone]  Cell phone [phone]

## 2020-11-30 ENCOUNTER — LAB REQUISITION (OUTPATIENT)
Dept: LAB | Facility: HOSPITAL | Age: 70
End: 2020-11-30

## 2020-11-30 DIAGNOSIS — Z00.00 ROUTINE GENERAL MEDICAL EXAMINATION AT A HEALTH CARE FACILITY: ICD-10-CM

## 2020-11-30 LAB
ALBUMIN SERPL-MCNC: 3.8 G/DL (ref 3.5–5.2)
ALBUMIN/GLOB SERPL: 1.5 G/DL
ALP SERPL-CCNC: 79 U/L (ref 39–117)
ALT SERPL W P-5'-P-CCNC: 33 U/L (ref 1–33)
ANION GAP SERPL CALCULATED.3IONS-SCNC: 10.3 MMOL/L (ref 5–15)
AST SERPL-CCNC: 36 U/L (ref 1–32)
BASOPHILS # BLD AUTO: 0.01 10*3/MM3 (ref 0–0.2)
BASOPHILS NFR BLD AUTO: 0.2 % (ref 0–1.5)
BILIRUB SERPL-MCNC: 0.2 MG/DL (ref 0–1.2)
BUN SERPL-MCNC: 27 MG/DL (ref 8–23)
BUN/CREAT SERPL: 27.3 (ref 7–25)
CALCIUM SPEC-SCNC: 8.7 MG/DL (ref 8.6–10.5)
CHLORIDE SERPL-SCNC: 96 MMOL/L (ref 98–107)
CO2 SERPL-SCNC: 29.7 MMOL/L (ref 22–29)
CREAT SERPL-MCNC: 0.99 MG/DL (ref 0.57–1)
DEPRECATED RDW RBC AUTO: 42.9 FL (ref 37–54)
EOSINOPHIL # BLD AUTO: 0.05 10*3/MM3 (ref 0–0.4)
EOSINOPHIL NFR BLD AUTO: 0.8 % (ref 0.3–6.2)
ERYTHROCYTE [DISTWIDTH] IN BLOOD BY AUTOMATED COUNT: 12.5 % (ref 12.3–15.4)
GFR SERPL CREATININE-BSD FRML MDRD: 55 ML/MIN/1.73
GFR SERPL CREATININE-BSD FRML MDRD: 67 ML/MIN/1.73
GLOBULIN UR ELPH-MCNC: 2.5 GM/DL
GLUCOSE SERPL-MCNC: 129 MG/DL (ref 65–99)
HCT VFR BLD AUTO: 40 % (ref 34–46.6)
HGB BLD-MCNC: 13.6 G/DL (ref 12–15.9)
IMM GRANULOCYTES # BLD AUTO: 0.05 10*3/MM3 (ref 0–0.05)
IMM GRANULOCYTES NFR BLD AUTO: 0.8 % (ref 0–0.5)
LYMPHOCYTES # BLD AUTO: 1.57 10*3/MM3 (ref 0.7–3.1)
LYMPHOCYTES NFR BLD AUTO: 26.3 % (ref 19.6–45.3)
MCH RBC QN AUTO: 32.3 PG (ref 26.6–33)
MCHC RBC AUTO-ENTMCNC: 34 G/DL (ref 31.5–35.7)
MCV RBC AUTO: 95 FL (ref 79–97)
MONOCYTES # BLD AUTO: 0.7 10*3/MM3 (ref 0.1–0.9)
MONOCYTES NFR BLD AUTO: 11.7 % (ref 5–12)
NEUTROPHILS NFR BLD AUTO: 3.58 10*3/MM3 (ref 1.7–7)
NEUTROPHILS NFR BLD AUTO: 60.2 % (ref 42.7–76)
NRBC BLD AUTO-RTO: 0 /100 WBC (ref 0–0.2)
PLATELET # BLD AUTO: 244 10*3/MM3 (ref 140–450)
PMV BLD AUTO: 10 FL (ref 6–12)
POTASSIUM SERPL-SCNC: 5.2 MMOL/L (ref 3.5–5.2)
PROT SERPL-MCNC: 6.3 G/DL (ref 6–8.5)
RBC # BLD AUTO: 4.21 10*6/MM3 (ref 3.77–5.28)
SODIUM SERPL-SCNC: 136 MMOL/L (ref 136–145)
WBC # BLD AUTO: 5.96 10*3/MM3 (ref 3.4–10.8)

## 2020-11-30 PROCEDURE — 80053 COMPREHEN METABOLIC PANEL: CPT

## 2020-11-30 PROCEDURE — 85025 COMPLETE CBC W/AUTO DIFF WBC: CPT

## 2021-07-29 ENCOUNTER — TELEPHONE (OUTPATIENT)
Dept: NEUROLOGY | Facility: CLINIC | Age: 71
End: 2021-07-29

## 2021-07-29 NOTE — TELEPHONE ENCOUNTER
Has the patient been seen in the last 6 months? 7/29/20    If the patient has not been seen and the machine is broken, schedule an appointment and alert the practice- OFFICE AWARE AND PT SCHEDULED FOR F/U SLEEP APPT WITH DR. SEIPEL ON Monday, 8/2/21.    SIER[facility], WITH PT'S NURSING FACILITY, CALLED TO SCHEDULE F/U APPT FOR PT DUE TO A BROKEN BIPAP MACHINE. INFORMED [facility]ER[facility] THAT PT WAS ACTUALLY SCHEDULED FOR A F/U WITH DR. SEIPEL TODAY (DATE OF CALL, 7/29/21) AT 3:45PM (TIME OF CALL WAS 2:30PM). [facility] STATED THAT THEY WERE UNAWARE OF THE APPT AND REQUESTED TO RESCHEDULED FOR CURRENT APPT.    SIERRA CAN BE REACHED AT [phone] WITH ANY FURTHER QUESTIONS OR CONCERNS.    DOCUMENTING PER [employer].

## 2021-08-02 ENCOUNTER — OFFICE VISIT (OUTPATIENT)
Dept: NEUROLOGY | Facility: CLINIC | Age: 71
End: 2021-08-02

## 2021-08-02 VITALS — HEART RATE: 77 BPM | SYSTOLIC BLOOD PRESSURE: 180 MMHG | DIASTOLIC BLOOD PRESSURE: 80 MMHG

## 2021-08-02 DIAGNOSIS — G81.90 HEMIPLEGIA OF DOMINANT SIDE (HCC): ICD-10-CM

## 2021-08-02 DIAGNOSIS — G40.909 SEIZURE DISORDER (HCC): ICD-10-CM

## 2021-08-02 DIAGNOSIS — G47.33 OBSTRUCTIVE SLEEP APNEA SYNDROME: Primary | ICD-10-CM

## 2021-08-02 PROCEDURE — 99214 OFFICE O/P EST MOD 30 MIN: CPT | Performed by: PSYCHIATRY & NEUROLOGY

## 2021-08-02 RX ORDER — CIPROFLOXACIN 250 MG/1
TABLET, FILM COATED ORAL
COMMUNITY
End: 2022-09-08

## 2021-08-02 RX ORDER — OLANZAPINE 2.5 MG/1
TABLET ORAL
COMMUNITY
End: 2022-09-08

## 2021-08-02 RX ORDER — BACLOFEN 5 MG/1
TABLET ORAL
COMMUNITY
Start: 2021-07-06 | End: 2022-09-08

## 2021-08-02 RX ORDER — NITROFURANTOIN 25; 75 MG/1; MG/1
CAPSULE ORAL
COMMUNITY
End: 2022-09-08

## 2021-08-02 RX ORDER — BISACODYL 10 MG
10 SUPPOSITORY, RECTAL RECTAL DAILY PRN
COMMUNITY
Start: 2021-05-20

## 2021-08-02 RX ORDER — CEPHALEXIN 500 MG/1
CAPSULE ORAL
COMMUNITY
End: 2022-09-08

## 2021-08-02 RX ORDER — APIXABAN 5 MG/1
5 TABLET, FILM COATED ORAL 2 TIMES DAILY
COMMUNITY
Start: 2021-07-30

## 2021-08-02 RX ORDER — POLYETHYLENE GLYCOL 3350 17 G/17G
POWDER, FOR SOLUTION ORAL
COMMUNITY
Start: 2021-07-21 | End: 2022-09-08

## 2021-08-02 RX ORDER — METRONIDAZOLE 500 MG/1
TABLET ORAL
COMMUNITY
Start: 2021-07-29 | End: 2022-09-08

## 2021-08-02 NOTE — PROGRESS NOTES
Chief Complaint  Sleep Apnea    Subjective          Jeremiah Henson presents to [facility] NEUROLOG[facility]  History of Present Illness  Patient is here follow up on  BiPAP machine, her nasal mask is broken gets supplies from [place]    Pt has not been using BIPAP for some time time. Pt doesn't feel she needs to use bipap       Sleep testing history:    On NPSG at [facility] , 9/4/2009 patient had Moderate obstructive sleep apnea syndrome with apnea-hypopnea index of 9.5 per sleep hour,     Scr used for 4 days in past  6 months   Machine set on 24/12 set pressure, probably was to be on the auto mode but set up incorrectly    [place] Sleepiness Scale:  Sitting and reading 0 WatchingTV 0  Sitting, inactive, in a public place 0  As a passenger in a car for 1 hour w/o a break  0  Lying down to rest in the afternoon  0  Sitting and talking to someone  0  Sitting quietly after a lunch  0  In a car, while stopped for traffic or a light  0  Total 0    Review of Systems   Constitutional: Negative for fatigue and fever.   Respiratory: Negative for cough and shortness of breath.    Gastrointestinal: Negative for abdominal pain and nausea.   Genitourinary: Positive for urgency.   Musculoskeletal: Negative for back pain and neck pain.   Neurological: Negative for dizziness and light-headedness.         Objective   Vital Signs:   /80 (BP Location: Left arm, Patient Position: Sitting)   Pulse 77     Physical Exam  Vitals reviewed.   HENT:      Head: Normocephalic.      Nose: Nose normal.   Eyes:      Conjunctiva/sclera: Conjunctivae normal.      Pupils: Pupils are equal, round, and reactive to light.   Pulmonary:      Effort: No respiratory distress.   Neurological:      Mental Status: She is alert. Mental status is at baseline.      Comments: Right hemiparesis. With mod aphasia        Result Review :                 Assessment and Plan    Diagnoses and all orders for this visit:    1. Seizure  disorder (CMS/HCC) (Primary)    2. Obstructive sleep apnea syndrome    3. Hemiplegia of dominant side (CMS/HCC)         no seizures, continue Keppra and Depakote    Continue eloquis    Pt not using PAP,  incorrectly and probably over 5yr old and recalled    Will do in lab NPSG to evaluate for ru 'if needed then in lab titration study      Follow Up   Return in about 3 months (around 11/2/2021).    Patient was given instructions and counseling regarding her condition or for health maintenance advice. Please see specific information pulled into the AVS if appropriate.       This document has been electronically signed by Joseph Seipel, MD on August 2, 2021 14:52 EDT

## 2022-01-19 ENCOUNTER — HOSPITAL ENCOUNTER (EMERGENCY)
Facility: HOSPITAL | Age: 72
Discharge: NURSING, REHAB OR LONG-TERM CARE | End: 2022-01-20
Attending: EMERGENCY MEDICINE | Admitting: EMERGENCY MEDICINE

## 2022-01-19 ENCOUNTER — APPOINTMENT (OUTPATIENT)
Dept: GENERAL RADIOLOGY | Facility: HOSPITAL | Age: 72
End: 2022-01-19
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
## 2022-01-19 DIAGNOSIS — S70.01XA CONTUSION OF RIGHT HIP, INITIAL ENCOUNTER: Primary | ICD-10-CM

## 2022-01-19 PROCEDURE — 99283 EMERGENCY DEPT VISIT LOW MDM: CPT

## 2022-01-19 PROCEDURE — 73502 X-RAY EXAM HIP UNI 2-3 VIEWS: CPT

## 2022-01-20 VITALS
WEIGHT: 187 LBS | RESPIRATION RATE: 16 BRPM | HEART RATE: 70 BPM | OXYGEN SATURATION: 97 % | TEMPERATURE: 98 F | BODY MASS INDEX: 31.16 KG/M2 | DIASTOLIC BLOOD PRESSURE: 71 MMHG | HEIGHT: 65 IN | SYSTOLIC BLOOD PRESSURE: 168 MMHG

## 2022-01-20 NOTE — ED PROVIDER NOTES
"Subjective   Chief complaint: Fall    71-year-old female presents after a fall at her nursing home.  Patient has a history of strokes with right-sided weakness.  She states she was holding onto her bed rail trying to get back in bed when she fell backwards and landed on her buttocks.  She did not hit her head and had no loss of consciousness.  She reports some pain in her right hip although she states she normally has pain in her right hip.  She denies any other complaints.      History provided by:  Patient      Review of Systems   Constitutional: Negative for fever.   HENT: Negative for congestion.    Respiratory: Negative for cough and shortness of breath.    Cardiovascular: Negative for chest pain.   Gastrointestinal: Negative for abdominal pain and vomiting.   Musculoskeletal:        Right hip pain   Neurological: Negative for dizziness and headaches.       Past Medical History:   Diagnosis Date   • CVA (cerebral vascular accident) (CMS/HCC)    • Hyperlipidemia    • Hypertension    • Seizure (CMS/HCC)    • Sleep apnea        No Known Allergies    Past Surgical History:   Procedure Laterality Date   • COLONOSCOPY N/A 11/19/2020    Procedure: COLONOSCOPY WITH POLYPECTOMY X 2;  Surgeon: Abelardo Garay MD;  Location: [facility] ENDOSCOPY;  Service: Gastroenterology;  Laterality: N/A;  COLON POLYPS   • HIP SURGERY     • PACEMAKER IMPLANTATION         Family History   Problem Relation Age of Onset   • Lung cancer Father    • Heart attack Father        Social History     Socioeconomic History   • Marital status:    Tobacco Use   • Smoking status: Former Smoker     Years: 20.00   • Smokeless tobacco: Never Used   Substance and Sexual Activity   • Alcohol use: No   • Drug use: No   • Sexual activity: Defer       /82 (BP Location: Right arm, Patient Position: Lying)   Pulse 72   Temp 97.8 °F (36.6 °C) (Oral)   Resp 15   Ht 165.1 cm (65\")   Wt 84.8 kg (187 lb)   SpO2 96%   BMI 31.12 kg/m² "       Objective   Physical Exam  Vitals and nursing note reviewed.   Constitutional:       Appearance: Normal appearance.   HENT:      Head: Normocephalic and atraumatic.      Mouth/Throat:      Mouth: Mucous membranes are moist.   Eyes:      Pupils: Pupils are equal, round, and reactive to light.   Cardiovascular:      Rate and Rhythm: Normal rate and regular rhythm.      Heart sounds: Normal heart sounds.   Pulmonary:      Effort: Pulmonary effort is normal. No respiratory distress.      Breath sounds: Normal breath sounds.   Abdominal:      Palpations: Abdomen is soft.      Tenderness: There is no abdominal tenderness.   Musculoskeletal:      Comments: Mild tenderness to palpation to the right hip with no visible injury or deformity.  Good distal pulses.   Skin:     General: Skin is warm and dry.   Neurological:      Mental Status: She is alert and oriented to person, place, and time.      Comments: Right-sided paralysis from previous CVA.  No new focal deficits.         Procedures           ED Course      Right hip x-ray reviewed by me shows no fracture or dislocation, hardware in place, pending radiology review.                                           MDM   X-ray of the right hip was unremarkable.  Patient is well-appearing on exam.  She is stable for discharge back to her nursing home.      Final diagnoses:   Contusion of right hip, initial encounter   Fall, initial encounter       ED Disposition  ED Disposition     ED Disposition Condition Comment    Discharge Stable           Anna Barkley MD  [address] [place] [address] [place] [facility]  [phone]    Call in 2 days           Medication List      No changes were made to your prescriptions during this visit.          Jer Jiménez MD  01/20/22 0001

## 2022-09-08 ENCOUNTER — HOSPITAL ENCOUNTER (INPATIENT)
Facility: HOSPITAL | Age: 72
LOS: 4 days | Discharge: HOME | End: 2022-09-13
Attending: EMERGENCY MEDICINE | Admitting: INTERNAL MEDICINE

## 2022-09-08 ENCOUNTER — APPOINTMENT (OUTPATIENT)
Dept: GENERAL RADIOLOGY | Facility: HOSPITAL | Age: 72
End: 2022-09-08

## 2022-09-08 ENCOUNTER — APPOINTMENT (OUTPATIENT)
Dept: CT IMAGING | Facility: HOSPITAL | Age: 72
End: 2022-09-08

## 2022-09-08 DIAGNOSIS — R78.89 ELEVATED DIGOXIN LEVEL: ICD-10-CM

## 2022-09-08 DIAGNOSIS — R78.89 ELEVATED PHENYTOIN LEVEL: ICD-10-CM

## 2022-09-08 DIAGNOSIS — R41.82 ALTERED MENTAL STATUS, UNSPECIFIED ALTERED MENTAL STATUS TYPE: Primary | ICD-10-CM

## 2022-09-08 DIAGNOSIS — N30.01 ACUTE CYSTITIS WITH HEMATURIA: ICD-10-CM

## 2022-09-08 DIAGNOSIS — Z79.899 ON VALPROIC ACID THERAPY: ICD-10-CM

## 2022-09-08 DIAGNOSIS — N17.9 AKI (ACUTE KIDNEY INJURY): ICD-10-CM

## 2022-09-08 LAB
ALBUMIN SERPL-MCNC: 3.8 G/DL (ref 3.5–5.2)
ALBUMIN/GLOB SERPL: 1.2 G/DL
ALP SERPL-CCNC: 36 U/L (ref 39–117)
ALT SERPL W P-5'-P-CCNC: 8 U/L (ref 1–33)
ANION GAP SERPL CALCULATED.3IONS-SCNC: 18 MMOL/L (ref 5–15)
AST SERPL-CCNC: 14 U/L (ref 1–32)
BACTERIA UR QL AUTO: ABNORMAL /HPF
BASOPHILS # BLD AUTO: 0 10*3/MM3 (ref 0–0.2)
BASOPHILS NFR BLD AUTO: 0.5 % (ref 0–1.5)
BILIRUB SERPL-MCNC: 0.2 MG/DL (ref 0–1.2)
BILIRUB UR QL STRIP: NEGATIVE
BUN SERPL-MCNC: 57 MG/DL (ref 8–23)
BUN/CREAT SERPL: 38.5 (ref 7–25)
CALCIUM SPEC-SCNC: 9.5 MG/DL (ref 8.6–10.5)
CHLORIDE SERPL-SCNC: 104 MMOL/L (ref 98–107)
CLARITY UR: ABNORMAL
CO2 SERPL-SCNC: 24 MMOL/L (ref 22–29)
COLOR UR: ABNORMAL
CREAT SERPL-MCNC: 1.48 MG/DL (ref 0.57–1)
D-LACTATE SERPL-SCNC: 0.8 MMOL/L (ref 0.5–2)
DEPRECATED RDW RBC AUTO: 51.2 FL (ref 37–54)
DIGOXIN SERPL-MCNC: 1.4 NG/ML (ref 0.6–1.2)
EGFRCR SERPLBLD CKD-EPI 2021: 37.2 ML/MIN/1.73
EOSINOPHIL # BLD AUTO: 0 10*3/MM3 (ref 0–0.4)
EOSINOPHIL NFR BLD AUTO: 0.3 % (ref 0.3–6.2)
ERYTHROCYTE [DISTWIDTH] IN BLOOD BY AUTOMATED COUNT: 14.5 % (ref 12.3–15.4)
GLOBULIN UR ELPH-MCNC: 3.2 GM/DL
GLUCOSE BLDC GLUCOMTR-MCNC: 109 MG/DL (ref 70–105)
GLUCOSE SERPL-MCNC: 114 MG/DL (ref 65–99)
GLUCOSE UR STRIP-MCNC: NEGATIVE MG/DL
HCT VFR BLD AUTO: 35.8 % (ref 34–46.6)
HGB BLD-MCNC: 11.8 G/DL (ref 12–15.9)
HGB UR QL STRIP.AUTO: ABNORMAL
HYALINE CASTS UR QL AUTO: ABNORMAL /LPF
KETONES UR QL STRIP: ABNORMAL
LEUKOCYTE ESTERASE UR QL STRIP.AUTO: ABNORMAL
LYMPHOCYTES # BLD AUTO: 1.5 10*3/MM3 (ref 0.7–3.1)
LYMPHOCYTES NFR BLD AUTO: 42.6 % (ref 19.6–45.3)
MCH RBC QN AUTO: 33.4 PG (ref 26.6–33)
MCHC RBC AUTO-ENTMCNC: 33 G/DL (ref 31.5–35.7)
MCV RBC AUTO: 101.2 FL (ref 79–97)
MONOCYTES # BLD AUTO: 0.4 10*3/MM3 (ref 0.1–0.9)
MONOCYTES NFR BLD AUTO: 10.2 % (ref 5–12)
NEUTROPHILS NFR BLD AUTO: 1.7 10*3/MM3 (ref 1.7–7)
NEUTROPHILS NFR BLD AUTO: 46.4 % (ref 42.7–76)
NITRITE UR QL STRIP: NEGATIVE
NRBC BLD AUTO-RTO: 0 /100 WBC (ref 0–0.2)
PH UR STRIP.AUTO: <=5 [PH] (ref 5–8)
PHENYTOIN SERPL-MCNC: 21.6 MCG/ML (ref 10–20)
PLATELET # BLD AUTO: 268 10*3/MM3 (ref 140–450)
PMV BLD AUTO: 7.8 FL (ref 6–12)
POTASSIUM SERPL-SCNC: 4.5 MMOL/L (ref 3.5–5.2)
PROCALCITONIN SERPL-MCNC: 0.12 NG/ML (ref 0–0.25)
PROT SERPL-MCNC: 7 G/DL (ref 6–8.5)
PROT UR QL STRIP: ABNORMAL
QT INTERVAL: 367 MS
RBC # BLD AUTO: 3.53 10*6/MM3 (ref 3.77–5.28)
RBC # UR STRIP: ABNORMAL /HPF
REF LAB TEST METHOD: ABNORMAL
SODIUM SERPL-SCNC: 146 MMOL/L (ref 136–145)
SP GR UR STRIP: 1.02 (ref 1–1.03)
SQUAMOUS #/AREA URNS HPF: ABNORMAL /HPF
UROBILINOGEN UR QL STRIP: ABNORMAL
VALPROATE SERPL-MCNC: 15.7 MCG/ML (ref 50–125)
WBC # UR STRIP: ABNORMAL /HPF
WBC NRBC COR # BLD: 3.6 10*3/MM3 (ref 3.4–10.8)
WHOLE BLOOD HOLD COAG: NORMAL
WHOLE BLOOD HOLD SPECIMEN: NORMAL

## 2022-09-08 PROCEDURE — 85025 COMPLETE CBC W/AUTO DIFF WBC: CPT | Performed by: NURSE PRACTITIONER

## 2022-09-08 PROCEDURE — 81001 URINALYSIS AUTO W/SCOPE: CPT | Performed by: NURSE PRACTITIONER

## 2022-09-08 PROCEDURE — 87186 SC STD MICRODIL/AGAR DIL: CPT | Performed by: NURSE PRACTITIONER

## 2022-09-08 PROCEDURE — 87086 URINE CULTURE/COLONY COUNT: CPT | Performed by: NURSE PRACTITIONER

## 2022-09-08 PROCEDURE — 80162 ASSAY OF DIGOXIN TOTAL: CPT | Performed by: NURSE PRACTITIONER

## 2022-09-08 PROCEDURE — G0378 HOSPITAL OBSERVATION PER HR: HCPCS

## 2022-09-08 PROCEDURE — 99285 EMERGENCY DEPT VISIT HI MDM: CPT

## 2022-09-08 PROCEDURE — 87040 BLOOD CULTURE FOR BACTERIA: CPT | Performed by: NURSE PRACTITIONER

## 2022-09-08 PROCEDURE — 80185 ASSAY OF PHENYTOIN TOTAL: CPT | Performed by: NURSE PRACTITIONER

## 2022-09-08 PROCEDURE — 71045 X-RAY EXAM CHEST 1 VIEW: CPT

## 2022-09-08 PROCEDURE — 82962 GLUCOSE BLOOD TEST: CPT

## 2022-09-08 PROCEDURE — 70450 CT HEAD/BRAIN W/O DYE: CPT

## 2022-09-08 PROCEDURE — 99223 1ST HOSP IP/OBS HIGH 75: CPT | Performed by: NURSE PRACTITIONER

## 2022-09-08 PROCEDURE — 83605 ASSAY OF LACTIC ACID: CPT

## 2022-09-08 PROCEDURE — 93005 ELECTROCARDIOGRAM TRACING: CPT | Performed by: NURSE PRACTITIONER

## 2022-09-08 PROCEDURE — P9612 CATHETERIZE FOR URINE SPEC: HCPCS

## 2022-09-08 PROCEDURE — 25010000002 CEFTRIAXONE PER 250 MG: Performed by: NURSE PRACTITIONER

## 2022-09-08 PROCEDURE — 80053 COMPREHEN METABOLIC PANEL: CPT | Performed by: NURSE PRACTITIONER

## 2022-09-08 PROCEDURE — 87147 CULTURE TYPE IMMUNOLOGIC: CPT | Performed by: NURSE PRACTITIONER

## 2022-09-08 PROCEDURE — 80164 ASSAY DIPROPYLACETIC ACD TOT: CPT | Performed by: NURSE PRACTITIONER

## 2022-09-08 PROCEDURE — 87077 CULTURE AEROBIC IDENTIFY: CPT | Performed by: NURSE PRACTITIONER

## 2022-09-08 PROCEDURE — 84145 PROCALCITONIN (PCT): CPT | Performed by: NURSE PRACTITIONER

## 2022-09-08 PROCEDURE — 87150 DNA/RNA AMPLIFIED PROBE: CPT | Performed by: NURSE PRACTITIONER

## 2022-09-08 RX ORDER — SENNA PLUS 8.6 MG/1
2 TABLET ORAL 2 TIMES DAILY
COMMUNITY

## 2022-09-08 RX ORDER — ENOXAPARIN SODIUM 100 MG/ML
40 INJECTION SUBCUTANEOUS DAILY
Status: DISCONTINUED | OUTPATIENT
Start: 2022-09-08 | End: 2022-09-08 | Stop reason: ALTCHOICE

## 2022-09-08 RX ORDER — SODIUM CHLORIDE 0.9 % (FLUSH) 0.9 %
10 SYRINGE (ML) INJECTION AS NEEDED
Status: DISCONTINUED | OUTPATIENT
Start: 2022-09-08 | End: 2022-09-13 | Stop reason: HOSPADM

## 2022-09-08 RX ORDER — SODIUM CHLORIDE 0.9 % (FLUSH) 0.9 %
10 SYRINGE (ML) INJECTION EVERY 12 HOURS SCHEDULED
Status: DISCONTINUED | OUTPATIENT
Start: 2022-09-08 | End: 2022-09-13 | Stop reason: HOSPADM

## 2022-09-08 RX ORDER — HYDROCODONE BITARTRATE AND ACETAMINOPHEN 5; 325 MG/1; MG/1
1 TABLET ORAL 2 TIMES DAILY
COMMUNITY

## 2022-09-08 RX ORDER — ACETAMINOPHEN 325 MG/1
650 TABLET ORAL 2 TIMES DAILY
COMMUNITY
End: 2022-09-13 | Stop reason: HOSPADM

## 2022-09-08 RX ORDER — ENOXAPARIN SODIUM 100 MG/ML
1 INJECTION SUBCUTANEOUS EVERY 12 HOURS
Status: DISCONTINUED | OUTPATIENT
Start: 2022-09-09 | End: 2022-09-11

## 2022-09-08 RX ORDER — LIDOCAINE 4 G/G
PATCH TOPICAL DAILY
COMMUNITY

## 2022-09-08 RX ORDER — ACETAMINOPHEN 160 MG/5ML
650 SOLUTION ORAL EVERY 4 HOURS PRN
Status: DISCONTINUED | OUTPATIENT
Start: 2022-09-08 | End: 2022-09-13 | Stop reason: HOSPADM

## 2022-09-08 RX ORDER — DEXTROSE AND SODIUM CHLORIDE 5; .45 G/100ML; G/100ML
125 INJECTION, SOLUTION INTRAVENOUS CONTINUOUS
Status: DISCONTINUED | OUTPATIENT
Start: 2022-09-08 | End: 2022-09-09

## 2022-09-08 RX ORDER — CHOLECALCIFEROL (VITAMIN D3) 125 MCG
CAPSULE ORAL DAILY
COMMUNITY

## 2022-09-08 RX ORDER — BISACODYL 10 MG
10 SUPPOSITORY, RECTAL RECTAL DAILY PRN
Status: DISCONTINUED | OUTPATIENT
Start: 2022-09-08 | End: 2022-09-13 | Stop reason: HOSPADM

## 2022-09-08 RX ORDER — FENOFIBRATE 48 MG/1
48 TABLET, COATED ORAL DAILY
COMMUNITY

## 2022-09-08 RX ORDER — ACETAMINOPHEN 650 MG/1
650 SUPPOSITORY RECTAL EVERY 4 HOURS PRN
Status: DISCONTINUED | OUTPATIENT
Start: 2022-09-08 | End: 2022-09-13 | Stop reason: HOSPADM

## 2022-09-08 RX ORDER — SODIUM CHLORIDE 9 MG/ML
100 INJECTION, SOLUTION INTRAVENOUS CONTINUOUS
Status: DISCONTINUED | OUTPATIENT
Start: 2022-09-08 | End: 2022-09-08

## 2022-09-08 RX ORDER — ONDANSETRON 4 MG/1
4 TABLET, FILM COATED ORAL EVERY 6 HOURS PRN
Status: DISCONTINUED | OUTPATIENT
Start: 2022-09-08 | End: 2022-09-13 | Stop reason: HOSPADM

## 2022-09-08 RX ORDER — ONDANSETRON 2 MG/ML
4 INJECTION INTRAMUSCULAR; INTRAVENOUS EVERY 6 HOURS PRN
Status: DISCONTINUED | OUTPATIENT
Start: 2022-09-08 | End: 2022-09-13 | Stop reason: HOSPADM

## 2022-09-08 RX ORDER — ACETAMINOPHEN 325 MG/1
650 TABLET ORAL EVERY 4 HOURS PRN
Status: DISCONTINUED | OUTPATIENT
Start: 2022-09-08 | End: 2022-09-13 | Stop reason: HOSPADM

## 2022-09-08 RX ADMIN — SODIUM CHLORIDE 1000 ML: 9 INJECTION, SOLUTION INTRAVENOUS at 13:21

## 2022-09-08 RX ADMIN — ENOXAPARIN SODIUM 80 MG: 100 INJECTION SUBCUTANEOUS at 23:43

## 2022-09-08 RX ADMIN — DEXTROSE AND SODIUM CHLORIDE 125 ML/HR: 5; 450 INJECTION, SOLUTION INTRAVENOUS at 23:44

## 2022-09-08 RX ADMIN — CEFTRIAXONE 2 G: 2 INJECTION, POWDER, FOR SOLUTION INTRAMUSCULAR; INTRAVENOUS at 13:34

## 2022-09-08 RX ADMIN — Medication 10 ML: at 13:23

## 2022-09-08 RX ADMIN — VALPROATE SODIUM 250 MG: 100 INJECTION, SOLUTION INTRAVENOUS at 23:58

## 2022-09-08 RX ADMIN — Medication 10 ML: at 22:46

## 2022-09-08 RX ADMIN — SODIUM CHLORIDE 500 ML: 9 INJECTION, SOLUTION INTRAVENOUS at 22:46

## 2022-09-08 NOTE — CASE MANAGEMENT/SOCIAL WORK
Discharge Planning Assessment   [place]     Patient Name: Jeremiah Henson  MRN: [id number]  Today's Date: 9/8/2022    Admit Date: 9/8/2022     Discharge Needs Assessment     Row Name 09/08/22 1642       Living Environment    People in Home other (see comments)    Current Living Arrangements extended care facility  [place]    Primary Care Provided by child(trena)    Provides Primary Care For no one    Family Caregiver if Needed child(trena), adult    Quality of Family Relationships supportive    Able to Return to Prior Arrangements yes  Plan to return to [place]       Resource/Environmental Concerns    Transportation Concerns none  will need EMS transport       Transition Planning    Patient/Family Anticipates Transition to long-term care facility    Transportation Anticipated public transportation  daughter unable to transport       Discharge Needs Assessment    Readmission Within the Last 30 Days no previous admission in last 30 days    Equipment Currently Used at Home wheelchair    Concerns to be Addressed discharge planning               Discharge Plan     Row Name 09/08/22 1642       Plan    Plan DC Plan;Return to [place]    Plan Comments Spoke with patient daughter, who said plan is for her mother to return long term care at [place]. Notified liasaon of admisison.              Continued Care and Services - Admitted Since 9/8/2022     Destination     Service Provider Request Status Selected Services Address Phone Fax Patient Preferred    [facility]  Accepted N/A [address], [place] [id number] [phone] -- --                 Demographic Summary     Row Name 09/08/22 1641       General Information    Admission Type observation    Arrived From emergency department    Referral Source emergency department    Reason for Consult discharge planning    Preferred Language English               Functional Status     Row Name 09/08/22 1642       Functional Status     Usual Activity Tolerance poor    Current Activity Tolerance poor       Functional Status, IADL    Medications completely dependent    Meal Preparation completely dependent    Housekeeping completely dependent    Laundry completely dependent       Mental Status    General Appearance WDL WDL              Phone communication or documentation only - no physical contact with patient or family.  Katarina Pruitt RN

## 2022-09-08 NOTE — ED NOTES
Pt's daughter in to see pt. She confirmed that pt normally is wakeful or wakes easily and is able to give opinions and ask basic questions, regardless of confusion.

## 2022-09-08 NOTE — DISCHARGE PLACEMENT REQUEST
"Jeremiah Henson (71 y.o. Female)             Date of Birth   1950    Social Security Number       Address   [address] [place] [address]    Home Phone   [phone]    MRN   [id number]       Denominational   None    Marital Status                               Admission Date   9/8/22    Admission Type   Emergency    Admitting Provider   Valarie Hermosillo MD    Attending Provider   Valarie Hermosillo MD    Department, Room/Bed   [facility] EMERGENCY DEPARTMENT, 01/01       Discharge Date       Discharge Disposition       Discharge Destination                               Attending Provider: Valarie Hermosillo MD    Allergies: No Known Allergies    Isolation: None   Infection: None   Code Status: Prior   Advance Care Planning Activity    Ht: 165.1 cm (65\")   Wt: 84.8 kg (186 lb 15.2 oz)    Admission Cmt: None   Principal Problem: None                Active Insurance as of 9/8/2022     Primary Coverage     Payor Plan Insurance Group Employer/Plan Group    MISC MEDICARE REPLACEMENT MISC MCARE REPLACEMENT      Coverage Address Coverage Phone Number Coverage Fax Number Effective Dates    [address] [facility] [phone]  1/1/2022 - None Entered    [address]       [facility] [address]       Subscriber Name Subscriber Birth Date Member ID       JEREMIAH HENSON 1950 [id number]           Secondary Coverage     Payor Plan Insurance Group Employer/Plan Group    [place]      Payor Plan Address Payor Plan Phone Number Payor Plan Fax Number Effective Dates    [address]   7/21/2019 - None Entered    [place] [address]       Subscriber Name Subscriber Birth Date Member ID       JEREMIAH HENSON 1950 [id number]                 Emergency Contacts      (Rel.) Home Phone Work Phone Mobile Phone    PRAVEEN PARSONS (Daughter) -- -- [phone]              "

## 2022-09-08 NOTE — ED PROVIDER NOTES
"Subjective   PIT    71-year-old pleasantly demented, with communication barrier female presents from extended-care facility with reported worsening confusion.  The facility reports that she typically is able to answer simple questions and is only repeating \"mama\".      History provided by:  EMS personnel   used: No        Review of Systems   Unable to perform ROS: Dementia       Past Medical History:   Diagnosis Date   • CVA (cerebral vascular accident) (HCC)    • Hyperlipidemia    • Hypertension    • Seizure (HCC)    • Sleep apnea        No Known Allergies    Past Surgical History:   Procedure Laterality Date   • COLONOSCOPY N/A 11/19/2020    Procedure: COLONOSCOPY WITH POLYPECTOMY X 2;  Surgeon: Abelardo Garay MD;  Location: [facility] ENDOSCOPY;  Service: Gastroenterology;  Laterality: N/A;  COLON POLYPS   • HIP SURGERY     • PACEMAKER IMPLANTATION         Family History   Problem Relation Age of Onset   • Lung cancer Father    • Heart attack Father        Social History     Socioeconomic History   • Marital status:    Tobacco Use   • Smoking status: Former Smoker     Years: 20.00   • Smokeless tobacco: Never Used   Substance and Sexual Activity   • Alcohol use: No   • Drug use: No   • Sexual activity: Defer           Objective   Physical Exam  Vitals and nursing note reviewed.   Constitutional:       General: She is not in acute distress.     Appearance: She is well-developed. She is ill-appearing. She is not diaphoretic.   HENT:      Head: Normocephalic and atraumatic.      Right Ear: External ear normal.      Left Ear: External ear normal.      Nose: Nose normal.      Mouth/Throat:      Mouth: Mucous membranes are moist.      Pharynx: Oropharynx is clear.   Eyes:      Conjunctiva/sclera: Conjunctivae normal.      Pupils: Pupils are equal, round, and reactive to light.   Neck:      Trachea: Trachea and phonation normal.      Meningeal: Brudzinski's sign and Kernig's sign absent. "   Cardiovascular:      Rate and Rhythm: Normal rate and regular rhythm.      Pulses: Normal pulses.      Heart sounds: Normal heart sounds, S1 normal and S2 normal. No murmur heard.    No friction rub. No gallop.   Pulmonary:      Effort: Pulmonary effort is normal.      Breath sounds: Normal breath sounds.   Abdominal:      General: Bowel sounds are normal.      Palpations: Abdomen is soft.      Tenderness: There is no abdominal tenderness.   Musculoskeletal:         General: Normal range of motion.      Cervical back: Full passive range of motion without pain, normal range of motion and neck supple. No erythema or rigidity. No spinous process tenderness. Normal range of motion.   Skin:     General: Skin is warm and dry.      Capillary Refill: Capillary refill takes less than 2 seconds.   Neurological:      Mental Status: She is lethargic.      GCS: GCS eye subscore is 2. GCS verbal subscore is 4. GCS motor subscore is 5.   Psychiatric:         Thought Content: Thought content normal.         Cognition and Memory: Cognition is impaired (Cognitive disorder).         Judgment: Judgment normal.         Procedures           ED Course    CT Head Without Contrast    Result Date: 9/8/2022  No evidence of hemorrhage, mass effect or midline shift. No acute process identified. Stable chronic findings as above.  Electronically Signed By-Huong Dwyer MD On:9/8/2022 2:06 PM This report was finalized on [id number] by  Huong Dwyer MD.    XR Chest 1 View    Result Date: 9/8/2022   1. No acute cardiopulmonary findings.  2. Anterior inferior subluxation or dislocation of the right shoulder. This has a similar appearance to the chest x-ray from 10/5/2017. Correlate with physical exam findings. 3. Stable borderline cardiac enlargement. 4. Old right rib fractures.  Electronically Signed By-Taty Mack MD On:9/8/2022 12:55 PM This report was finalized on [id number] by  Taty Mack MD.    Medications   sodium chloride 0.9  % flush 10 mL (10 mL Intravenous Given 9/8/22 1323)   sodium chloride 0.9 % bolus 1,000 mL (0 mL Intravenous Stopped 9/8/22 1340)   cefTRIAXone (ROCEPHIN) 2 g in sodium chloride 0.9 % 100 mL IVPB (0 g Intravenous Stopped 9/8/22 1441)     Labs Reviewed   COMPREHENSIVE METABOLIC PANEL - Abnormal; Notable for the following components:       Result Value    Glucose 114 (*)     BUN 57 (*)     Creatinine 1.48 (*)     Sodium 146 (*)     Alkaline Phosphatase 36 (*)     BUN/Creatinine Ratio 38.5 (*)     Anion Gap 18.0 (*)     eGFR 37.2 (*)     All other components within normal limits    Narrative:     GFR Normal >60  Chronic Kidney Disease <60  Kidney Failure <15     URINALYSIS W/ MICROSCOPIC IF INDICATED (NO CULTURE) - Abnormal; Notable for the following components:    Color, UA Dark Yellow (*)     Appearance, UA Turbid (*)     Ketones, UA 15 mg/dL (1+) (*)     Blood, UA Small (1+) (*)     Protein, UA >=300 mg/dL (3+) (*)     Leuk Esterase, UA Large (3+) (*)     All other components within normal limits   DIGOXIN LEVEL - Abnormal; Notable for the following components:    Digoxin 1.40 (*)     All other components within normal limits   VALPROIC ACID LEVEL, TOTAL - Abnormal; Notable for the following components:    Valproic Acid 15.7 (*)     All other components within normal limits    Narrative:     Therapeutic Ranges for Valproic Acid    Epilepsy:       mcg/ml  Bipolar/Meli  up to 125 mcg/ml     PHENYTOIN LEVEL, TOTAL - Abnormal; Notable for the following components:    Phenytoin Level 21.6 (*)     All other components within normal limits   CBC WITH AUTO DIFFERENTIAL - Abnormal; Notable for the following components:    RBC 3.53 (*)     Hemoglobin 11.8 (*)     .2 (*)     MCH 33.4 (*)     All other components within normal limits   URINALYSIS, MICROSCOPIC ONLY - Abnormal; Notable for the following components:    RBC, UA 6-12 (*)     WBC, UA Too Numerous to Count (*)     Bacteria, UA 3+ (*)     Squamous  "Epithelial Cells, UA 3-6 (*)     All other components within normal limits   POCT GLUCOSE FINGERSTICK - Abnormal; Notable for the following components:    Glucose 109 (*)     All other components within normal limits   PROCALCITONIN - Normal    Narrative:     As a Marker for Sepsis (Non-Neonates):    1. <0.5 ng/mL represents a low risk of severe sepsis and/or septic shock.  2. >2 ng/mL represents a high risk of severe sepsis and/or septic shock.    As a Marker for Lower Respiratory Tract Infections that require antibiotic therapy:    PCT on Admission    Antibiotic Therapy       6-12 Hrs later    >0.5                Strongly Recommended  >0.25 - <0.5        Recommended   0.1 - 0.25          Discouraged              Remeasure/reassess PCT  <0.1                Strongly Discouraged     Remeasure/reassess PCT    As 28 day mortality risk marker: \"Change in Procalcitonin Result\" (>80% or <=80%) if Day 0 (or Day 1) and Day 4 values are available. Refer to http://www.[facility]-pct-calculator.com    Change in PCT <=80%  A decrease of PCT levels below or equal to 80% defines a positive change in PCT test result representing a higher risk for 28-day all-cause mortality of patients diagnosed with severe sepsis for septic shock.    Change in PCT >80%  A decrease of PCT levels of more than 80% defines a negative change in PCT result representing a lower risk for 28-day all-cause mortality of patients diagnosed with severe sepsis or septic shock.      POC LACTATE - Normal   BLOOD CULTURE   BLOOD CULTURE   RAINBOW DRAW    Narrative:     The following orders were created for panel order [place] Draw.  Procedure                               Abnormality         Status                     ---------                               -----------         ------                     Green Top (Gel)[[id number]]                                  Final result               Lavender Top[[id number]]                                     Final result         "       Gold Top - SST[[id number]]                                   Final result               Light Blue Top[[id number]]                                   Final result                 Please view results for these tests on the individual orders.   POCT GLUCOSE FINGERSTICK   POC LACTATE   CBC AND DIFFERENTIAL    Narrative:     The following orders were created for panel order CBC & Differential.  Procedure                               Abnormality         Status                     ---------                               -----------         ------                     CBC Auto Differential[[id number]]        Abnormal            Final result                 Please view results for these tests on the individual orders.   GREEN TOP   LAVENDER TOP   GOLD TOP - SST   LIGHT BLUE TOP                                            MDM  Number of Diagnoses or Management Options  Acute cystitis with hematuria  MEGAN (acute kidney injury) (HCC)  Altered mental status, unspecified altered mental status type  Elevated digoxin level  Elevated phenytoin level  On valproic acid therapy  Diagnosis management comments: Chart Review: 1/19/2022 patient was seen at this facility status post fall for hip contusion.  Comorbidity: Past Medical History:  No date: CVA (cerebral vascular accident) (HCC)  No date: Hyperlipidemia  No date: Hypertension  No date: Seizure ([facility])  No date: Sleep apnea  Imaging: Was interpreted by physician and reviewed by myself: CT Head Without Contrast   Final Result    No evidence of hemorrhage, mass effect or midline shift. No acute    process identified. Stable chronic findings as above.         Electronically Signed By-Huong Dwyer MD On:9/8/2022 2:06 PM    This report was finalized on [id number] by  Huong Dwyer MD.     XR Chest 1 View   Final Result         1. No acute cardiopulmonary findings.     2. Anterior inferior subluxation or dislocation of the right shoulder.    This has a similar appearance to the chest  "x-ray from 10/5/2017.    Correlate with physical exam findings.    3. Stable borderline cardiac enlargement.    4. Old right rib fractures.         Electronically Signed By-Taty Mack MD On:9/8/2022 12:55 PM    This report was finalized on [id number] by  Taty Mack MD.    Patient undressed and placed in gown for exam.  Appropriate PPE worn during patient exam.  Patient is noted to be lethargic on exam.  She is arousable with tactile stimuli.  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  She is also diagnosed with dementia.  Patient answers all questions with \"mama\".  Blood cell count 3600 platelets 268 hemoglobin 11.8 hematocrit 35.8.  Sodium 146 potassium 4.5 chloride 104 bicarb 24 BUN 57 creatinine 1.48 glucose 114.  Urine was significant for ketones, small blood, protein, leukoesterase, 6-12 red cells, too numerous white cells to count, and 3+ bacteria.  Lactic 0.8.  Blood cultures pending.  Patient was given Rocephin 2 g IV.  Procalcitonin 0.12.  Digoxin 1.4 phenytoin 21.6 valproic acid 15.7.  Patient's blood pressure dropped to the 80s systolic, 1 L normal saline bolus was given.  Upon reassessment her blood pressure is increased to the 130s over 70s.  Patient will be admitted for UTI.  The hospitalist was paged for admission.  Spoke with ANDRES Gordon excepted admission on behalf of Dr. Hermosillo.    Disposition/Treatment: Patient was stable upon admission.       Part of this note may be an electronic transcription/translation of spoken language to printed text using the Dragon Dictation System.            Amount and/or Complexity of Data Reviewed  Clinical lab tests: reviewed  Tests in the radiology section of CPT®: reviewed  Decide to obtain previous medical records or to obtain history from someone other than the patient: yes    Risk of Complications, Morbidity, and/or Mortality  General comments: This case was discussed " with my attending, Dr. Capellan who is agreeable with plan of care.     Patient Progress  Patient progress: stable      Final diagnoses:   Altered mental status, unspecified altered mental status type   Acute cystitis with hematuria   Elevated digoxin level   On valproic acid therapy   Elevated phenytoin level   MEGAN (acute kidney injury) (HCC)       ED Disposition  ED Disposition     ED Disposition   Decision to Admit    Condition   --    Comment   --             No follow-up provider specified.       Medication List      No changes were made to your prescriptions during this visit.          Brianna Spear, APRN  09/08/22 1368

## 2022-09-09 LAB
ANION GAP SERPL CALCULATED.3IONS-SCNC: 17 MMOL/L (ref 5–15)
BACTERIA BLD CULT: ABNORMAL
BASOPHILS # BLD AUTO: 0 10*3/MM3 (ref 0–0.2)
BASOPHILS NFR BLD AUTO: 0.4 % (ref 0–1.5)
BOTTLE TYPE: ABNORMAL
BUN SERPL-MCNC: 51 MG/DL (ref 8–23)
BUN/CREAT SERPL: 44.7 (ref 7–25)
CALCIUM SPEC-SCNC: 9 MG/DL (ref 8.6–10.5)
CHLORIDE SERPL-SCNC: 107 MMOL/L (ref 98–107)
CO2 SERPL-SCNC: 23 MMOL/L (ref 22–29)
CREAT SERPL-MCNC: 1.14 MG/DL (ref 0.57–1)
D-LACTATE SERPL-SCNC: 2.5 MMOL/L (ref 0.5–2)
DEPRECATED RDW RBC AUTO: 49.9 FL (ref 37–54)
DIGOXIN SERPL-MCNC: 0.5 NG/ML (ref 0.6–1.2)
EGFRCR SERPLBLD CKD-EPI 2021: 50.9 ML/MIN/1.73
EOSINOPHIL # BLD AUTO: 0 10*3/MM3 (ref 0–0.4)
EOSINOPHIL NFR BLD AUTO: 0.1 % (ref 0.3–6.2)
ERYTHROCYTE [DISTWIDTH] IN BLOOD BY AUTOMATED COUNT: 14.2 % (ref 12.3–15.4)
GLUCOSE SERPL-MCNC: 142 MG/DL (ref 65–99)
HCT VFR BLD AUTO: 34.5 % (ref 34–46.6)
HGB BLD-MCNC: 11.5 G/DL (ref 12–15.9)
LYMPHOCYTES # BLD AUTO: 1.3 10*3/MM3 (ref 0.7–3.1)
LYMPHOCYTES NFR BLD AUTO: 33.3 % (ref 19.6–45.3)
MCH RBC QN AUTO: 33 PG (ref 26.6–33)
MCHC RBC AUTO-ENTMCNC: 33.4 G/DL (ref 31.5–35.7)
MCV RBC AUTO: 98.8 FL (ref 79–97)
MONOCYTES # BLD AUTO: 0.4 10*3/MM3 (ref 0.1–0.9)
MONOCYTES NFR BLD AUTO: 10.3 % (ref 5–12)
NEUTROPHILS NFR BLD AUTO: 2.1 10*3/MM3 (ref 1.7–7)
NEUTROPHILS NFR BLD AUTO: 55.9 % (ref 42.7–76)
NRBC BLD AUTO-RTO: 0 /100 WBC (ref 0–0.2)
PHENYTOIN SERPL-MCNC: 9.7 MCG/ML (ref 10–20)
PLATELET # BLD AUTO: 254 10*3/MM3 (ref 140–450)
PMV BLD AUTO: 7.5 FL (ref 6–12)
POTASSIUM SERPL-SCNC: 3.9 MMOL/L (ref 3.5–5.2)
RBC # BLD AUTO: 3.49 10*6/MM3 (ref 3.77–5.28)
SODIUM SERPL-SCNC: 147 MMOL/L (ref 136–145)
VALPROATE SERPL-MCNC: 7.6 MCG/ML (ref 50–125)
WBC NRBC COR # BLD: 3.8 10*3/MM3 (ref 3.4–10.8)

## 2022-09-09 PROCEDURE — 25010000002 ENOXAPARIN PER 10 MG: Performed by: NURSE PRACTITIONER

## 2022-09-09 PROCEDURE — 99232 SBSQ HOSP IP/OBS MODERATE 35: CPT | Performed by: INTERNAL MEDICINE

## 2022-09-09 PROCEDURE — 25010000002 CEFTRIAXONE PER 250 MG: Performed by: NURSE PRACTITIONER

## 2022-09-09 PROCEDURE — 85025 COMPLETE CBC W/AUTO DIFF WBC: CPT | Performed by: NURSE PRACTITIONER

## 2022-09-09 PROCEDURE — 83605 ASSAY OF LACTIC ACID: CPT | Performed by: INTERNAL MEDICINE

## 2022-09-09 PROCEDURE — 36415 COLL VENOUS BLD VENIPUNCTURE: CPT | Performed by: NURSE PRACTITIONER

## 2022-09-09 PROCEDURE — 80162 ASSAY OF DIGOXIN TOTAL: CPT | Performed by: INTERNAL MEDICINE

## 2022-09-09 PROCEDURE — 80185 ASSAY OF PHENYTOIN TOTAL: CPT | Performed by: INTERNAL MEDICINE

## 2022-09-09 PROCEDURE — 80048 BASIC METABOLIC PNL TOTAL CA: CPT | Performed by: NURSE PRACTITIONER

## 2022-09-09 PROCEDURE — 36415 COLL VENOUS BLD VENIPUNCTURE: CPT | Performed by: INTERNAL MEDICINE

## 2022-09-09 PROCEDURE — 80164 ASSAY DIPROPYLACETIC ACD TOT: CPT | Performed by: INTERNAL MEDICINE

## 2022-09-09 PROCEDURE — 80177 DRUG SCRN QUAN LEVETIRACETAM: CPT | Performed by: INTERNAL MEDICINE

## 2022-09-09 PROCEDURE — 25010000002 HYDRALAZINE PER 20 MG: Performed by: INTERNAL MEDICINE

## 2022-09-09 RX ORDER — HYDRALAZINE HYDROCHLORIDE 20 MG/ML
10 INJECTION INTRAMUSCULAR; INTRAVENOUS EVERY 6 HOURS PRN
Status: DISCONTINUED | OUTPATIENT
Start: 2022-09-09 | End: 2022-09-13 | Stop reason: HOSPADM

## 2022-09-09 RX ORDER — DEXTROSE MONOHYDRATE 50 MG/ML
50 INJECTION, SOLUTION INTRAVENOUS CONTINUOUS
Status: DISCONTINUED | OUTPATIENT
Start: 2022-09-09 | End: 2022-09-11

## 2022-09-09 RX ORDER — DIVALPROEX SODIUM 250 MG/1
250 TABLET, DELAYED RELEASE ORAL 3 TIMES DAILY
Status: DISCONTINUED | OUTPATIENT
Start: 2022-09-09 | End: 2022-09-10

## 2022-09-09 RX ADMIN — Medication 10 ML: at 22:33

## 2022-09-09 RX ADMIN — DEXTROSE AND SODIUM CHLORIDE 125 ML/HR: 5; 450 INJECTION, SOLUTION INTRAVENOUS at 15:16

## 2022-09-09 RX ADMIN — DEXTROSE AND SODIUM CHLORIDE 125 ML/HR: 5; 450 INJECTION, SOLUTION INTRAVENOUS at 07:45

## 2022-09-09 RX ADMIN — ENOXAPARIN SODIUM 80 MG: 100 INJECTION SUBCUTANEOUS at 12:37

## 2022-09-09 RX ADMIN — HYDRALAZINE HYDROCHLORIDE 10 MG: 20 INJECTION INTRAMUSCULAR; INTRAVENOUS at 06:29

## 2022-09-09 RX ADMIN — Medication 10 ML: at 08:46

## 2022-09-09 RX ADMIN — DIVALPROEX SODIUM 250 MG: 250 TABLET, DELAYED RELEASE ORAL at 22:33

## 2022-09-09 RX ADMIN — DEXTROSE MONOHYDRATE 50 ML/HR: 50 INJECTION, SOLUTION INTRAVENOUS at 22:33

## 2022-09-09 RX ADMIN — CEFTRIAXONE 1 G: 1 INJECTION, POWDER, FOR SOLUTION INTRAMUSCULAR; INTRAVENOUS at 12:38

## 2022-09-09 NOTE — PLAN OF CARE
"Goal Outcome Evaluation:      Pt oriented x0. Pt responds with \"I don't know\" when asked orientation questions. Pt says \"mama\" and \"I hurt\" when asked what hurts pt repeats herself. Pt able to swallow clear liquids with no issue. Pt is a Q2 turn with x2 assist. Room assigned but is currently dirty. VSS.      Problem: Adult Inpatient Plan of Care  Goal: Plan of Care Review  Outcome: no change        "

## 2022-09-09 NOTE — PAYOR COMM NOTE
"Please see following request for hospital observation stay. Please advise if additional information is needed to process this request.    Thank you,     Tati Zhou  Utilization Review Coordinator  [facility]  [address] [place]  [address]  Ph: [phone]  Fx: [phone]              Jeremiah Henson (71 y.o. Female)             Date of Birth   1950    Social Security Number       Address   [address] [place] [address] [place] [address]    Home Phone   [phone]    MRN   [id number]       Christian   None    Marital Status                               Admission Date   9/8/22    Admission Type   Emergency    Admitting Provider   Valarie Hermosillo MD    Attending Provider   Valarie Hermosillo MD    Department, Room/Bed   [facility] EMERGENCY DEPARTMENT, EDH2/2       Discharge Date       Discharge Disposition       Discharge Destination                               Attending Provider: Valarie Hermosillo MD    Allergies: No Known Allergies    Isolation: None   Infection: None   Code Status: CPR   Advance Care Planning Activity    Ht: 165.1 cm (65\")   Wt: 84.8 kg (186 lb 15.2 oz)    Admission Cmt: None   Principal Problem: None                Active Insurance as of 9/8/2022     Primary Coverage     Payor Plan Insurance Group Employer/Plan Group    MISC MEDICARE REPLACEMENT MISC MCARE REPLACEMENT [id number]     Coverage Address Coverage Phone Number Coverage Fax Number Effective Dates    [address] [facility] [phone]  1/1/2022 - None Entered    [facility] [address]       [facility] [address]       Subscriber Name Subscriber Birth Date Member ID       JEREMIAH HENSON 1950 [id number]           Secondary Coverage     Payor Plan Insurance Group Employer/Plan Group    [place]      Payor Plan Address Payor Plan Phone Number Payor Plan Fax Number Effective Dates    [address]   7/21/2019 - None Entered    [place] [address]       Subscriber Name Subscriber " Birth Date Member ID       TAQUERIA HENSON 1950 [id number]                 Emergency Contacts      (Rel.) Home Phone Work Phone Mobile Phone    PRAVEEN PARSONS (Daughter) -- -- [phone]               History & Physical      Heidi LyonsJESUS at 20              [facility] Medicine Services      Patient Name: Taqueria Henson  : 1950  MRN: [id number]  Primary Care Physician:  Anna Barkley MD  Date of admission: 2022      Subjective      Chief Complaint: Altered mental status     History of Present Illness: Taqueria Henson is a 71 y.o. female who presented to [facility] on 2022 from the [facility] complaining of altered mental status as noted by [facility] staff.  At time of interview the patient moans and calls for her mother.  The patient is noted to have dry mucosa.  She moves her extremities with weakness continuously but not to command.  She crosses her left leg over her right.  Where her left leg touches her right there is an area consistent with an old wound, possibly pressure wound that is completely healed.  Patient is unable to provide any information about her recent review of systems or past medical history.      Review of Systems   Unable to perform ROS: acuity of condition       Personal History     Past Medical History:   Diagnosis Date   • CVA (cerebral vascular accident) (HCC)    • Hyperlipidemia    • Hypertension    • Seizure (HCC)    • Sleep apnea        Past Surgical History:   Procedure Laterality Date   • COLONOSCOPY N/A 2020    Procedure: COLONOSCOPY WITH POLYPECTOMY X 2;  Surgeon: Abelardo Garay MD;  Location: [facility] ENDOSCOPY;  Service: Gastroenterology;  Laterality: N/A;  COLON POLYPS   • HIP SURGERY     • PACEMAKER IMPLANTATION         Family History: family history includes Heart attack in her father; Lung cancer in her father. Otherwise pertinent FHx was reviewed and not pertinent to current  issue.    Social History:  reports that she has quit smoking. She quit after 20.00 years of use. She has never used smokeless tobacco. She reports that she does not drink alcohol and does not use drugs.    Home Medications:  Prior to Admission Medications     Prescriptions Last Dose Informant Patient Reported? Taking?    acetaminophen (TYLENOL) 325 MG tablet  Nursing Home Yes No    Take 650 mg by mouth Every 8 (Eight) Hours As Needed for Mild Pain , Moderate Pain  or Fever.    acetaminophen (Tylenol) 325 MG tablet  Nursing Home Yes No    Take 650 mg by mouth 2 (Two) Times a Day.    amLODIPine (NORVASC) 10 MG tablet  Nursing Home Yes No    Take 10 mg by mouth Every Night.    atorvastatin (LIPITOR) 10 MG tablet  Nursing Home Yes No    Take 10 mg by mouth Every Night.    baclofen (LIORESAL) 10 MG tablet  Nursing Home Yes No    15 mg 3 (Three) Times a Day.    bisacodyl (DULCOLAX) 10 MG suppository  Nursing Home Yes No    Insert 10 mg into the rectum Daily As Needed.    busPIRone (BUSPAR) 5 MG tablet  Nursing Home Yes No    Take 5 mg by mouth 2 (Two) Times a Day.    Cholecalciferol (Vitamin D3) 50 MCG (2000 UT) tablet  Nursing Home Yes No    Take  by mouth Daily.    Diclofenac Sodium (VOLTAREN) 1 % gel gel  Nursing Home Yes No    2 (Two) Times a Day. Right Knee/ Right Shoulder    digoxin (LANOXIN) 250 MCG tablet  Nursing Home Yes No    Take 250 mcg by mouth Daily. Hold For Pulse <60    divalproex (DEPAKOTE) 125 MG DR tablet  Nursing Home Yes No    Take 250 mg by mouth 2 (Two) Times a Day.    docusate sodium (COLACE) 100 MG capsule  Nursing Home Yes No    Take 100 mg by mouth 2 (Two) Times a Day.    Eliquis 5 MG tablet tablet  Nursing Home Yes No    Take 5 mg by mouth 2 (Two) Times a Day.    fenofibrate (TRICOR) 48 MG tablet  Nursing Home Yes No    Take 48 mg by mouth Daily.    hydrALAZINE (APRESOLINE) 25 MG tablet  Nursing Home Yes No    Take 25 mg by mouth Daily. Total dose = 75 mg; give with 50 mg tabs   morning     hydrALAZINE (APRESOLINE) 50 MG tablet  Nursing Home Yes No    Take 50 mg by mouth every night at bedtime.    HYDROcodone-acetaminophen (NORCO) 5-325 MG per tablet  Nursing Home Yes No    Take 1 tablet by mouth 2 (Two) Times a Day.    levETIRAcetam (KEPPRA) 500 MG tablet  Nursing Home Yes No    Take 500 mg by mouth 2 (Two) Times a Day.    Lidocaine 4 % patch  Nursing Home Yes No    Apply  topically Daily. Right Shoulder    lisinopril (PRINIVIL,ZESTRIL) 40 MG tablet  Nursing Home Yes No    Take 40 mg by mouth Daily.    Melatonin 3 MG tablet  Nursing Home Yes No    Take 3 mg by mouth Every Night.    metoprolol tartrate (LOPRESSOR) 100 MG tablet  Nursing Home Yes No    Take 100 mg by mouth Daily.    Omega-3 1000 MG capsule  Nursing Home Yes No    Take 1 capsule by mouth 2 (two) times a day.    phenytoin extended (DILANTIN) 200 MG ER capsule  Nursing Home Yes No    Take 200 mg by mouth 2 (Two) Times a Day.    polyethylene glycol (MIRALAX) 17 g packet  Nursing Home Yes No    Take 17 g by mouth Daily.    senna (senna) 8.6 MG tablet  Nursing Home Yes No    Take 2 tablets by mouth 2 (Two) Times a Day.    sertraline (ZOLOFT) 50 MG tablet  Nursing Home Yes No    Take 50 mg by mouth Daily.            Allergies:  No Known Allergies    Objective      Vitals:   Temp:  [96.2 °F (35.7 °C)-97.9 °F (36.6 °C)] 97.6 °F (36.4 °C)  Heart Rate:  [59-95] 68  Resp:  [12-16] 12  BP: ()/(32-77) 91/40    Physical Exam  Vitals and nursing note reviewed.   Constitutional:       General: She is not in acute distress.     Appearance: She is ill-appearing. She is not toxic-appearing or diaphoretic.   HENT:      Head: Normocephalic and atraumatic.      Right Ear: External ear normal.      Left Ear: External ear normal.      Nose: Nose normal. No congestion or rhinorrhea.      Mouth/Throat:      Mouth: Mucous membranes are dry.   Eyes:      General: No scleral icterus.     Pupils: Pupils are equal, round, and reactive to light.   Neck:       Vascular: No carotid bruit.   Cardiovascular:      Rate and Rhythm: Normal rate and regular rhythm.      Heart sounds: No murmur heard.  Pulmonary:      Effort: No accessory muscle usage, prolonged expiration or respiratory distress.      Comments: There is mild facial grimace with moderate palpation of the chest  Chest:      Chest wall: Tenderness present.   Abdominal:      General: Bowel sounds are normal.      Palpations: Abdomen is soft.      Tenderness: There is no abdominal tenderness.      Comments: No facial grimace with palpation of the abdomen   Musculoskeletal:         General: Swelling present.      Cervical back: Neck supple. No rigidity.      Comments: There is trace edema in the bilateral lower extremities up to the thighs; the right foot has 3+ edema; the left foot has trace edema; the bilateral feet have crusted skin on the soles of the feet; the feet are not dirty   Skin:     General: Skin is warm and dry.      Capillary Refill: Capillary refill takes less than 2 seconds.      Coloration: Skin is pale.      Comments: The skin is pale   Neurological:      Mental Status: She is lethargic.   Psychiatric:         Attention and Perception: She is inattentive.         Speech: She is noncommunicative.         Cognition and Memory: Cognition is impaired.      Comments: The patient calls for her mother but otherwise moans and does not attempt to communicate         Result Review    Result Review:  I have personally reviewed the results from the time of this admission to 9/8/2022 22:34 EDT and agree with these findings:  [x]  Laboratory  [x]  Microbiology  [x]  Radiology  [x]  EKG/Telemetry   []  Cardiology/Vascular   []  Pathology  [x]  Old records  []  Other:  Most notable findings include: Elevated phenytoin level; acute kidney injury    Assessment & Plan        Active Hospital Problems:  Active Hospital Problems    Diagnosis    • Altered mental status, unspecified altered mental status type      Plan:      Altered mental status--likely metabolic encephalopathy related to acute kidney injury, hypernatremia; UTI; consideration for elevated Dilantin level: Monitor for improvement with electrolyte improvement and treatment for UTI    UTI: IV Rocephin; add urine culture to urinalysis specimen  -Blood cultures pending  -Lactate negative at 0.8    MEGAN, creatinine 1.48 with comparison 0.65--likely secondary to decreased oral intake: Bladder scan x1; IV fluids D5 and a half at 125; repeat BMP in a.m.  -Patient received 1500 cc normal saline in ER  -IV fluids D5 and a half for maintenance secondary to hypernatremia, mild, sodium 146  -Anion gap 18; CO2 24    Elevated phenytoin level 21.6--likely secondary to dehydration, acute kidney injury: Hold Dilantin  -Consider mild decrease in Dilantin on discharge    Atrial fibrillation, chronic with elevated digoxin level: Hold digoxin; hold Eliquis; bridge Lovenox based on creatinine clearance 36 at 80 mg/kg every 12 hours; hold metoprolol secondary to borderline blood pressure; check EKG  -Digoxin level 1.40    Hypertension, chronic, uncontrolled with current hypotension: Hold Norvasc; hold lisinopril; hold metoprolol; hold hydralazine; cautious IV fluids    HLD, chronic: Hold Lipitor    Chronic pain: Hold baclofen secondary to altered mental status; hold Norco    Anxiety, chronic: Hold BuSpar secondary to altered mental status; hold Zoloft secondary to altered mental status    Seizure disorder, chronic: Continue Depakote; continue Keppra; hold Dilantin  -Valproic acid level 15  -Dilantin level 21.6    Hypertriglyceridemia: Hold Tricor      DVT prophylaxis:  Medical DVT prophylaxis orders are present.    CODE STATUS:    Level Of Support Discussed With: Patient  Code Status (Patient has no pulse and is not breathing): CPR (Attempt to Resuscitate)  Medical Interventions (Patient has pulse or is breathing): Full Support    Admission Status:  I believe this patient meets observation  "status.    I discussed the patient's findings and my recommendations with patient and nursing staff.    This patient has been examined wearing appropriate Personal Protective Equipment. 09/08/22      Signature: Electronically signed by JESUS Fowler, 09/08/22, 11:12 PM EDT.      Electronically signed by Heidi Lyons APRN at 09/08/22 2314          Emergency Department Notes      Brianna Spear APRN at 09/08/22 1140     Attestation signed by Bairon Capellan DO at 09/08/22 1557        NON FACE TO FACE: This visit was performed by BOTH a physician and an APC. I performed all aspects of the MDM as documented.  Bairon Capellan DO 9/8/2022 15:57 EDT                         Subjective   PIT    71-year-old pleasantly demented, with communication barrier female presents from [facility]-care facility with reported worsening confusion.  The facility reports that she typically is able to answer simple questions and is only repeating \"mama\".      History provided by:  EMS personnel   used: No        Review of Systems   Unable to perform ROS: Dementia       Past Medical History:   Diagnosis Date   • CVA (cerebral vascular accident) (HCC)    • Hyperlipidemia    • Hypertension    • Seizure (HCC)    • Sleep apnea        No Known Allergies    Past Surgical History:   Procedure Laterality Date   • COLONOSCOPY N/A 11/19/2020    Procedure: COLONOSCOPY WITH POLYPECTOMY X 2;  Surgeon: Abelardo Garay MD;  Location: [facility] ENDOSCOPY;  Service: Gastroenterology;  Laterality: N/A;  COLON POLYPS   • HIP SURGERY     • PACEMAKER IMPLANTATION         Family History   Problem Relation Age of Onset   • Lung cancer Father    • Heart attack Father        Social History     Socioeconomic History   • Marital status:    Tobacco Use   • Smoking status: Former Smoker     Years: 20.00   • Smokeless tobacco: Never Used   Substance and Sexual Activity   • Alcohol use: No   • Drug use: No   • Sexual activity: Defer "           Objective   Physical Exam  Vitals and nursing note reviewed.   Constitutional:       General: She is not in acute distress.     Appearance: She is well-developed. She is ill-appearing. She is not diaphoretic.   HENT:      Head: Normocephalic and atraumatic.      Right Ear: External ear normal.      Left Ear: External ear normal.      Nose: Nose normal.      Mouth/Throat:      Mouth: Mucous membranes are moist.      Pharynx: Oropharynx is clear.   Eyes:      Conjunctiva/sclera: Conjunctivae normal.      Pupils: Pupils are equal, round, and reactive to light.   Neck:      Trachea: Trachea and phonation normal.      Meningeal: Brudzinski's sign and Kernig's sign absent.   Cardiovascular:      Rate and Rhythm: Normal rate and regular rhythm.      Pulses: Normal pulses.      Heart sounds: Normal heart sounds, S1 normal and S2 normal. No murmur heard.    No friction rub. No gallop.   Pulmonary:      Effort: Pulmonary effort is normal.      Breath sounds: Normal breath sounds.   Abdominal:      General: Bowel sounds are normal.      Palpations: Abdomen is soft.      Tenderness: There is no abdominal tenderness.   Musculoskeletal:         General: Normal range of motion.      Cervical back: Full passive range of motion without pain, normal range of motion and neck supple. No erythema or rigidity. No spinous process tenderness. Normal range of motion.   Skin:     General: Skin is warm and dry.      Capillary Refill: Capillary refill takes less than 2 seconds.   Neurological:      Mental Status: She is lethargic.      GCS: GCS eye subscore is 2. GCS verbal subscore is 4. GCS motor subscore is 5.   Psychiatric:         Thought Content: Thought content normal.         Cognition and Memory: Cognition is impaired (Cognitive disorder).         Judgment: Judgment normal.         Procedures          ED Course    CT Head Without Contrast    Result Date: 9/8/2022  No evidence of hemorrhage, mass effect or midline shift. No  acute process identified. Stable chronic findings as above.  Electronically Signed By-Huong Dwyer MD On:9/8/2022 2:06 PM This report was finalized on [id number] by  Huong Dwyer MD.    XR Chest 1 View    Result Date: 9/8/2022   1. No acute cardiopulmonary findings.  2. Anterior inferior subluxation or dislocation of the right shoulder. This has a similar appearance to the chest x-ray from 10/5/2017. Correlate with physical exam findings. 3. Stable borderline cardiac enlargement. 4. Old right rib fractures.  Electronically Signed By-Taty Mack MD On:9/8/2022 12:55 PM This report was finalized on [id number] by  Taty Mack MD.    Medications   sodium chloride 0.9 % flush 10 mL (10 mL Intravenous Given 9/8/22 1323)   sodium chloride 0.9 % bolus 1,000 mL (0 mL Intravenous Stopped 9/8/22 1340)   cefTRIAXone (ROCEPHIN) 2 g in sodium chloride 0.9 % 100 mL IVPB (0 g Intravenous Stopped 9/8/22 1441)     Labs Reviewed   COMPREHENSIVE METABOLIC PANEL - Abnormal; Notable for the following components:       Result Value    Glucose 114 (*)     BUN 57 (*)     Creatinine 1.48 (*)     Sodium 146 (*)     Alkaline Phosphatase 36 (*)     BUN/Creatinine Ratio 38.5 (*)     Anion Gap 18.0 (*)     eGFR 37.2 (*)     All other components within normal limits    Narrative:     GFR Normal >60  Chronic Kidney Disease <60  Kidney Failure <15     URINALYSIS W/ MICROSCOPIC IF INDICATED (NO CULTURE) - Abnormal; Notable for the following components:    Color, UA Dark Yellow (*)     Appearance, UA Turbid (*)     Ketones, UA 15 mg/dL (1+) (*)     Blood, UA Small (1+) (*)     Protein, UA >=300 mg/dL (3+) (*)     Leuk Esterase, UA Large (3+) (*)     All other components within normal limits   DIGOXIN LEVEL - Abnormal; Notable for the following components:    Digoxin 1.40 (*)     All other components within normal limits   VALPROIC ACID LEVEL, TOTAL - Abnormal; Notable for the following components:    Valproic Acid 15.7 (*)     All other  "components within normal limits    Narrative:     Therapeutic Ranges for Valproic Acid    Epilepsy:       mcg/ml  Bipolar/Meli  up to 125 mcg/ml     PHENYTOIN LEVEL, TOTAL - Abnormal; Notable for the following components:    Phenytoin Level 21.6 (*)     All other components within normal limits   CBC WITH AUTO DIFFERENTIAL - Abnormal; Notable for the following components:    RBC 3.53 (*)     Hemoglobin 11.8 (*)     .2 (*)     MCH 33.4 (*)     All other components within normal limits   URINALYSIS, MICROSCOPIC ONLY - Abnormal; Notable for the following components:    RBC, UA 6-12 (*)     WBC, UA Too Numerous to Count (*)     Bacteria, UA 3+ (*)     Squamous Epithelial Cells, UA 3-6 (*)     All other components within normal limits   POCT GLUCOSE FINGERSTICK - Abnormal; Notable for the following components:    Glucose 109 (*)     All other components within normal limits   PROCALCITONIN - Normal    Narrative:     As a Marker for Sepsis (Non-Neonates):    1. <0.5 ng/mL represents a low risk of severe sepsis and/or septic shock.  2. >2 ng/mL represents a high risk of severe sepsis and/or septic shock.    As a Marker for Lower Respiratory Tract Infections that require antibiotic therapy:    PCT on Admission    Antibiotic Therapy       6-12 Hrs later    >0.5                Strongly Recommended  >0.25 - <0.5        Recommended   0.1 - 0.25          Discouraged              Remeasure/reassess PCT  <0.1                Strongly Discouraged     Remeasure/reassess PCT    As 28 day mortality risk marker: \"Change in Procalcitonin Result\" (>80% or <=80%) if Day 0 (or Day 1) and Day 4 values are available. Refer to http://www.[facility]-pct-calculator.com    Change in PCT <=80%  A decrease of PCT levels below or equal to 80% defines a positive change in PCT test result representing a higher risk for 28-day all-cause mortality of patients diagnosed with severe sepsis for septic shock.    Change in PCT >80%  A decrease of " PCT levels of more than 80% defines a negative change in PCT result representing a lower risk for 28-day all-cause mortality of patients diagnosed with severe sepsis or septic shock.      POC LACTATE - Normal   BLOOD CULTURE   BLOOD CULTURE   RAINBOW DRAW    Narrative:     The following orders were created for panel order [place] Draw.  Procedure                               Abnormality         Status                     ---------                               -----------         ------                     Green Top (Gel)[[id number]]                                  Final result               Lavender Top[[id number]]                                     Final result               Gold Top - SST[[id number]]                                   Final result               Light Blue Top[[id number]]                                   Final result                 Please view results for these tests on the individual orders.   POCT GLUCOSE FINGERSTICK   POC LACTATE   CBC AND DIFFERENTIAL    Narrative:     The following orders were created for panel order CBC & Differential.  Procedure                               Abnormality         Status                     ---------                               -----------         ------                     CBC Auto Differential[[id number]]        Abnormal            Final result                 Please view results for these tests on the individual orders.   GREEN TOP   LAVENDER TOP   GOLD TOP - SST   LIGHT BLUE TOP                                            MDM  Number of Diagnoses or Management Options  Acute cystitis with hematuria  MEGAN (acute kidney injury) (HCC)  Altered mental status, unspecified altered mental status type  Elevated digoxin level  Elevated phenytoin level  On valproic acid therapy  Diagnosis management comments: Chart Review: 1/19/2022 patient was seen at this facility status post fall for hip contusion.  Comorbidity: Past Medical History:  No date: CVA (cerebral  "vascular accident) (HCC)  No date: Hyperlipidemia  No date: Hypertension  No date: Seizure (HCC)  No date: Sleep apnea  Imaging: Was interpreted by physician and reviewed by myself: CT Head Without Contrast   Final Result    No evidence of hemorrhage, mass effect or midline shift. No acute    process identified. Stable chronic findings as above.         Electronically Signed By-Huong Dwyer MD On:9/8/2022 2:06 PM    This report was finalized on [id number] by  Huong Dwyer MD.     XR Chest 1 View   Final Result         1. No acute cardiopulmonary findings.     2. Anterior inferior subluxation or dislocation of the right shoulder.    This has a similar appearance to the chest x-ray from 10/5/2017.    Correlate with physical exam findings.    3. Stable borderline cardiac enlargement.    4. Old right rib fractures.         Electronically Signed By-Taty Mack MD On:9/8/2022 12:55 PM    This report was finalized on [id number] by  Taty Mack MD.    Patient undressed and placed in gown for exam.  Appropriate PPE worn during patient exam.  Patient is noted to be lethargic on exam.  She is arousable with tactile stimuli.  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  She is also diagnosed with dementia.  Patient answers all questions with \"mama\".  Blood cell count 3600 platelets 268 hemoglobin 11.8 hematocrit 35.8.  Sodium 146 potassium 4.5 chloride 104 bicarb 24 BUN 57 creatinine 1.48 glucose 114.  Urine was significant for ketones, small blood, protein, leukoesterase, 6-12 red cells, too numerous white cells to count, and 3+ bacteria.  Lactic 0.8.  Blood cultures pending.  Patient was given Rocephin 2 g IV.  Procalcitonin 0.12.  Digoxin 1.4 phenytoin 21.6 valproic acid 15.7.  Patient's blood pressure dropped to the 80s systolic, 1 L normal saline bolus was given.  Upon reassessment her blood pressure is increased to the 130s " over 70s.  Patient will be admitted for UTI.  The hospitalist was paged for admission.  Spoke with ANDRES Gordon excepted admission on behalf of Dr. Hermosillo.    Disposition/Treatment: Patient was stable upon admission.       Part of this note may be an electronic transcription/translation of spoken language to printed text using the Dragon Dictation System.            Amount and/or Complexity of Data Reviewed  Clinical lab tests: reviewed  Tests in the radiology section of CPT®: reviewed  Decide to obtain previous medical records or to obtain history from someone other than the patient: yes    Risk of Complications, Morbidity, and/or Mortality  General comments: This case was discussed with my attending, Dr. Capellan who is agreeable with plan of care.     Patient Progress  Patient progress: stable      Final diagnoses:   Altered mental status, unspecified altered mental status type   Acute cystitis with hematuria   Elevated digoxin level   On valproic acid therapy   Elevated phenytoin level   MEGAN (acute kidney injury) ([facility])       ED Disposition  ED Disposition     ED Disposition   Decision to Admit    Condition   --    Comment   --             No follow-up provider specified.       Medication List      No changes were made to your prescriptions during this visit.          Brianna Spear, APRN  09/08/22 1457      Electronically signed by Bairon Capellan DO at 09/08/22 1557     Dolores Jiménez RN at 09/08/22 1310        I have remained with pt for b/p check, hot blanket application, and fluids to be hung.    Electronically signed by Dolores Jiménez RN at 09/08/22 1328     Dolores Jiménez RN at 09/08/22 1647        Pt's daughter in to see pt. She confirmed that pt normally is wakeful or wakes easily and is able to give opinions and ask basic questions, regardless of confusion.     Electronically signed by Dolores Jiménez RN at 09/08/22 1649     Dolores Jiménez RN at 09/08/22 5557        gavin applied to  pt.    Electronically signed by Dolores Jiménez RN at 09/08/22 1746       Vital Signs (last day)     Date/Time Temp Temp src Pulse Resp BP Patient Position SpO2    09/09/22 1205 -- -- 83 15 116/55 Lying 97    09/09/22 1033 -- -- 80 16 127/50 Lying 98    09/09/22 0818 97.8 (36.6) Oral -- -- -- -- --    09/09/22 0809 -- -- 103 18 160/61 Lying 96    09/09/22 0656 -- -- 84 -- 138/52 -- 98    09/09/22 0600 -- -- 74 -- 187/82 Sitting 97    09/09/22 0500 -- -- 75 -- -- -- 99    09/09/22 0456 -- -- 90 -- 161/60 Sitting --    09/09/22 0427 97.6 (36.4) Axillary 71 12 167/62 Lying 99    09/09/22 0201 -- Axillary 102 20 141/52 Lying 99    09/08/22 2345 97.4 (36.3) Axillary 79 16 151/62 Sitting 99    09/08/22 2200 97.6 (36.4) Axillary 68 12 91/40 Lying 100    09/08/22 2005 -- -- 72 -- 96/54 -- 100    09/08/22 1859 97.9 (36.6) Axillary 73 12 131/50 Lying 99    09/08/22 1800 97.8 (36.6) Oral 91 16 132/64 -- 97    09/08/22 1732 -- -- 95 16 149/63 -- 96    09/08/22 1702 -- -- 72 16 95/46 -- 98    09/08/22 1631 -- -- 78 16 149/59 -- 97    09/08/22 1431 -- -- 77 16 100/45 -- 98    09/08/22 1415 -- -- 66 16 104/48 Lying 98    09/08/22 13:58:36 -- -- -- -- 137/77 Lying --    09/08/22 1345 -- -- 65 16 85/37 Lying 98    09/08/22 1344 -- -- 61 -- 87/39 -- 98    09/08/22 13:34:35 -- -- 65 16 103/48 Lying 97    09/08/22 1334 -- -- 69 -- 103/48 -- 95    09/08/22 13:17:20 96.2 (35.7) Rectal 71 16 71/36 Lying 99    09/08/22 1304 -- -- 60 -- 74/37 -- 98    09/08/22 1301 -- -- -- -- 66/36 -- --    09/08/22 1244 -- -- 67 -- 63/32 -- 99    09/08/22 1234 -- -- 68 -- 99/46 -- 98    09/08/22 1217 -- -- 60 16 128/72 -- 98    09/08/22 1144 -- -- 75 -- 124/50 -- 99    09/08/22 1134 -- -- 77 -- 110/44 -- 99    09/08/22 1119 -- -- 93 -- 121/52 -- 98    09/08/22 1032 -- -- 59 16 115/50 -- 97            Lab Results (last 24 hours)     Procedure Component Value Units Date/Time    Basic Metabolic Panel [[id number]]  (Abnormal) Collected: 09/09/22 0518     Specimen: Blood Updated: 09/09/22 0554     Glucose 142 mg/dL      BUN 51 mg/dL      Creatinine 1.14 mg/dL      Sodium 147 mmol/L      Potassium 3.9 mmol/L      Comment: Slight hemolysis detected by analyzer. Results may be affected.        Chloride 107 mmol/L      CO2 23.0 mmol/L      Calcium 9.0 mg/dL      BUN/Creatinine Ratio 44.7     Anion Gap 17.0 mmol/L      eGFR 50.9 mL/min/1.73      Comment: National Kidney Foundation and American Society of Nephrology (ASN) Task Force recommended calculation based on the Chronic Kidney Disease Epidemiology Collaboration (CKD-EPI) equation refit without adjustment for race.       Narrative:      GFR Normal >60  Chronic Kidney Disease <60  Kidney Failure <15      CBC Auto Differential [[id number]]  (Abnormal) Collected: 09/09/22 0518    Specimen: Blood Updated: 09/09/22 0533     WBC 3.80 10*3/mm3      RBC 3.49 10*6/mm3      Hemoglobin 11.5 g/dL      Hematocrit 34.5 %      MCV 98.8 fL      MCH 33.0 pg      MCHC 33.4 g/dL      RDW 14.2 %      RDW-SD 49.9 fl      MPV 7.5 fL      Platelets 254 10*3/mm3      Neutrophil % 55.9 %      Lymphocyte % 33.3 %      Monocyte % 10.3 %      Eosinophil % 0.1 %      Basophil % 0.4 %      Neutrophils, Absolute 2.10 10*3/mm3      Lymphocytes, Absolute 1.30 10*3/mm3      Monocytes, Absolute 0.40 10*3/mm3      Eosinophils, Absolute 0.00 10*3/mm3      Basophils, Absolute 0.00 10*3/mm3      nRBC 0.0 /100 WBC     Urine Culture - Urine, Urine, Catheter [[id number]] Collected: 09/08/22 1203    Specimen: Urine, Catheter Updated: 09/08/22 2317    Procalcitonin [[id number]]  (Normal) Collected: 09/08/22 1200    Specimen: Blood Updated: 09/08/22 1441     Procalcitonin 0.12 ng/mL     Narrative:      As a Marker for Sepsis (Non-Neonates):    1. <0.5 ng/mL represents a low risk of severe sepsis and/or septic shock.  2. >2 ng/mL represents a high risk of severe sepsis and/or septic shock.    As a Marker for Lower Respiratory Tract Infections that require  "antibiotic therapy:    PCT on Admission    Antibiotic Therapy       6-12 Hrs later    >0.5                Strongly Recommended  >0.25 - <0.5        Recommended   0.1 - 0.25          Discouraged              Remeasure/reassess PCT  <0.1                Strongly Discouraged     Remeasure/reassess PCT    As 28 day mortality risk marker: \"Change in Procalcitonin Result\" (>80% or <=80%) if Day 0 (or Day 1) and Day 4 values are available. Refer to http://[email or web address]-pct-calculator.com    Change in PCT <=80%  A decrease of PCT levels below or equal to 80% defines a positive change in PCT test result representing a higher risk for 28-day all-cause mortality of patients diagnosed with severe sepsis for septic shock.    Change in PCT >80%  A decrease of PCT levels of more than 80% defines a negative change in PCT result representing a lower risk for 28-day all-cause mortality of patients diagnosed with severe sepsis or septic shock.       Blood Culture - Blood, Arm, Right [[id number]] Collected: 09/08/22 1319    Specimen: Blood from Arm, Right Updated: 09/08/22 1340    Blood Culture - Blood, Hand, Left [[id number]] Collected: 09/08/22 1318    Specimen: Blood from Hand, Left Updated: 09/08/22 1340    POC Lactate [[id number]]  (Normal) Collected: 09/08/22 1307    Specimen: Blood Updated: 09/08/22 1308     Lactate 0.8 mmol/L      Comment: Serial Number: [id number]:  [id number]       [place] [facility] [[id number]] Collected: 09/08/22 1200    Specimen: Blood Updated: 09/08/22 1302    Narrative:      The following orders were created for panel order [place] [facility].  Procedure                               Abnormality         Status                     ---------                               -----------         ------                     Green Top (Gel)[[id number]]                                  Final result               Lavender Top[[id number]]                                     Final result               Gold Top - SST[[id number]]       "                             Final result               Light Blue Top[[id number]]                                   Final result                 Please view results for these tests on the individual orders.    Light Blue Top [[id number]] Collected: 09/08/22 1200    Specimen: Blood Updated: 09/08/22 1302     Extra Tube Hold for add-ons.     Comment: Auto resulted       Lavender Top [[id number]] Collected: 09/08/22 1200    Specimen: Blood Updated: 09/08/22 1302     Extra Tube hold for add-on     Comment: Auto resulted       Phenytoin Level, Total [[id number]]  (Abnormal) Collected: 09/08/22 1200    Specimen: Blood Updated: 09/08/22 1238     Phenytoin Level 21.6 mcg/mL     Urinalysis, Microscopic Only - Urine, Catheter [[id number]]  (Abnormal) Collected: 09/08/22 1203    Specimen: Urine, Catheter Updated: 09/08/22 1237     RBC, UA 6-12 /HPF      WBC, UA Too Numerous to Count /HPF      Bacteria, UA 3+ /HPF      Squamous Epithelial Cells, UA 3-6 /HPF      Hyaline Casts, UA None Seen /LPF      Methodology Manual Light Microscopy    Green Top (Gel) [[id number]] Collected: 09/08/22 1200    Specimen: Blood Updated: 09/08/22 1236    Valproic Acid Level, Total [[id number]]  (Abnormal) Collected: 09/08/22 1200    Specimen: Blood Updated: 09/08/22 1236     Valproic Acid 15.7 mcg/mL     Narrative:      Therapeutic Ranges for Valproic Acid    Epilepsy:       mcg/ml  Bipolar/Meli  up to 125 mcg/ml      Digoxin Level [[id number]]  (Abnormal) Collected: 09/08/22 1200    Specimen: Blood Updated: 09/08/22 1236     Digoxin 1.40 ng/mL     Comprehensive Metabolic Panel [[id number]]  (Abnormal) Collected: 09/08/22 1200    Specimen: Blood Updated: 09/08/22 1233     Glucose 114 mg/dL      BUN 57 mg/dL      Creatinine 1.48 mg/dL      Sodium 146 mmol/L      Potassium 4.5 mmol/L      Chloride 104 mmol/L      CO2 24.0 mmol/L      Calcium 9.5 mg/dL      Total Protein 7.0 g/dL      Albumin 3.80 g/dL      ALT (SGPT) 8 U/L      AST (SGOT) 14 U/L       Alkaline Phosphatase 36 U/L      Total Bilirubin 0.2 mg/dL      Globulin 3.2 gm/dL      A/G Ratio 1.2 g/dL      BUN/Creatinine Ratio 38.5     Anion Gap 18.0 mmol/L      eGFR 37.2 mL/min/1.73      Comment: National Kidney Foundation and American Society of Nephrology (ASN) Task Force recommended calculation based on the Chronic Kidney Disease Epidemiology Collaboration (CKD-EPI) equation refit without adjustment for race.       Narrative:      GFR Normal >60  Chronic Kidney Disease <60  Kidney Failure <15      Gold Top - [facility] [[id number]] Collected: 09/08/22 1200    Specimen: Blood Updated: 09/08/22 1230    Urinalysis With Microscopic If Indicated (No Culture) - Urine, Catheter [[id number]]  (Abnormal) Collected: 09/08/22 1203    Specimen: Urine, Catheter Updated: 09/08/22 1226     Color, UA Dark Yellow     Appearance, UA Turbid     pH, UA <=5.0     Specific Gravity, UA 1.021     Glucose, UA Negative     Ketones, UA 15 mg/dL (1+)     Bilirubin, UA Negative     Blood, UA Small (1+)     Protein, UA >=300 mg/dL (3+)     Leuk Esterase, UA Large (3+)     Nitrite, UA Negative     Urobilinogen, UA 1.0 E.U./dL        Imaging Results (Last 24 Hours)     Procedure Component Value Units Date/Time    CT Head Without Contrast [[id number]] Collected: 09/08/22 1402     Updated: 09/08/22 1408    Narrative:      CT HEAD WO CONTRAST-     Date of Exam: 9/8/2022 1:56 PM     Indication: reported confusion in demented patient-h/o CVA on Eliquis.     Comparison: CT 08/26/2020     Technique:  Without contrast, contiguous axial CT images of the head  were obtained from skull base to vertex.  Coronal and sagittal  reconstructions were performed.  Automated exposure control and  iterative reconstruction methods were used.     FINDINGS  No evidence of intracranial hemorrhage, mass, or midline shift. Stable  encephalomalacia within the left MCA territory as well as within the  right occipital lobe, unchanged as compared to the previous study.  No  new areas of hypodensity identified. The ventricles appear normal in  size for the patient's age. No extra-axial collections identified. The  gray white matter differentiation is intact. No air-fluid levels  identified within the paranasal sinuses. The extra-axial structures  demonstrate no acute process. Calcified lesion within the right  cerebellar scalp likely represents a calcified sebaceous cyst.       Impression:      No evidence of hemorrhage, mass effect or midline shift. No acute  process identified. Stable chronic findings as above.     Electronically Signed By-Huong Dwyer MD On:9/8/2022 2:06 PM  This report was finalized on [id number] by  Huong Dwyer MD.    XR Chest 1 View [[id number]] Collected: 09/08/22 1254     Updated: 09/08/22 1258    Narrative:      DATE OF EXAM:  9/8/2022 12:00 PM     PROCEDURE:  XR CHEST 1 VW-     INDICATIONS:  AMS Protocol       COMPARISON:  AP portable chest 10/5/2017.     TECHNIQUE:   Single radiographic view of the chest was obtained.     FINDINGS:  The lungs are free of acute airspace disease. Benign calcified granuloma  is seen in the right costophrenic angle. No pleural effusion or  pneumothorax is seen. Stable borderline cardiac enlargement. Pacemaker  lead extends to the level of the right ventricle. Moderate coronary  artery calcifications present. There are multiple old right rib  fractures. Osteopenic changes are present. There is anterior inferior  subluxation or dislocation of the right shoulder, similar in appearance  to the 2017 comparison.       Impression:         1. No acute cardiopulmonary findings.   2. Anterior inferior subluxation or dislocation of the right shoulder.  This has a similar appearance to the chest x-ray from 10/5/2017.  Correlate with physical exam findings.  3. Stable borderline cardiac enlargement.  4. Old right rib fractures.     Electronically Signed By-Taty Mack MD On:9/8/2022 12:55 PM  This report was finalized on  [id number] by  Taty Mack MD.        ECG/EMG Results (last 24 hours)     Procedure Component Value Units Date/Time    ECG 12 Lead [[id number]] Collected: 09/08/22 2328     Updated: 09/08/22 2330     QT Interval 367 ms     Narrative:      HEART RATE= 72  bpm  RR Interval= 838  ms  SD Interval=   ms  P Horizontal Axis=   deg  P Front Axis=   deg  QRSD Interval= 100  ms  QT Interval= 367  ms  QRS Axis= 27  deg  T Wave Axis= 207  deg  - ABNORMAL ECG -  Pacemaker spikes or artifacts  Atrial fibrillation  Repol abnrm suggests ischemia, anterolateral  When compared with ECG of 03-Oct-2017 21:10:37,  Significant change in rhythm  New or worsened ischemia or infarction  Significant repolarization change  Significant axis, voltage or hypertrophy change  Electronically Signed By:   Date and Time of Study: 2022-09-08 23:28:53

## 2022-09-09 NOTE — PLAN OF CARE
Goal Outcome Evaluation:  Plan of Care Reviewed With: patient        Progress: no change  Outcome Evaluation: Pt Alert to voice/stimuli. Bedrest Q2T x 2 assist. F/C placed per orders rt retention. IVFs running per orders.

## 2022-09-09 NOTE — PROGRESS NOTES
[facility] Medicine Services Daily Progress Note    Patient Name: Jeremiah Henson  : 1950  MRN: [id number]  Primary Care Physician:  Anna Barkley MD  Date of admission: 2022      Subjective      Chief Complaint: Alteration in mentation    Review of Systems   Unable to perform ROS: mental status change          Objective      Vitals:   Temp:  [97.1 °F (36.2 °C)-97.9 °F (36.6 °C)] 97.1 °F (36.2 °C)  Heart Rate:  [] 71  Resp:  [11-20] 11  BP: ()/(40-82) 122/57    Physical Exam  Vitals and nursing note reviewed.   Constitutional:       General: She is not in acute distress.     Appearance: Normal appearance. She is well-developed. She is not ill-appearing, toxic-appearing or diaphoretic.      Comments: The patient is moderately obese with a BMI of 31.   HENT:      Head: Normocephalic and atraumatic.      Right Ear: Ear canal and external ear normal.      Left Ear: Ear canal and external ear normal.      Nose: Nose normal. No congestion or rhinorrhea.      Mouth/Throat:      Mouth: Mucous membranes are moist.      Pharynx: No oropharyngeal exudate.   Eyes:      General: No scleral icterus.        Right eye: No discharge.         Left eye: No discharge.      Extraocular Movements: Extraocular movements intact.      Conjunctiva/sclera: Conjunctivae normal.      Pupils: Pupils are equal, round, and reactive to light.   Neck:      Thyroid: No thyromegaly.      Vascular: No carotid bruit or JVD.      Trachea: No tracheal deviation.   Cardiovascular:      Rate and Rhythm: Normal rate and regular rhythm.      Pulses: Normal pulses.      Heart sounds: Normal heart sounds. No murmur heard.    No friction rub. No gallop.   Pulmonary:      Effort: Pulmonary effort is normal. No respiratory distress.      Breath sounds: Normal breath sounds. No stridor. No wheezing, rhonchi or rales.   Chest:      Chest wall: No tenderness.   Abdominal:      General: Bowel sounds are normal.  There is no distension.      Palpations: Abdomen is soft. There is no mass.      Tenderness: There is no abdominal tenderness. There is no guarding or rebound.      Hernia: No hernia is present.   Musculoskeletal:         General: No swelling, tenderness, deformity or signs of injury. Normal range of motion.      Cervical back: Normal range of motion and neck supple. No rigidity. No muscular tenderness.      Right lower leg: No edema.      Left lower leg: No edema.   Lymphadenopathy:      Cervical: No cervical adenopathy.   Skin:     General: Skin is warm and dry.      Coloration: Skin is not jaundiced or pale.      Findings: No bruising, erythema or rash.   Neurological:      Mental Status: She is alert.      Cranial Nerves: No cranial nerve deficit.      Sensory: No sensory deficit.      Motor: No weakness or abnormal muscle tone.      Coordination: Coordination normal.      Comments: I do not know what the patient's baseline is neurologically.  She responds to stimulation but cannot answer questions.  I do not know if she is hearing impaired as part of her overall medical condition which may be complicating her current health issues.   Psychiatric:         Mood and Affect: Mood normal.         Behavior: Behavior normal.         Thought Content: Thought content normal.         Judgment: Judgment normal.           Result Review    Result Review:  I have personally reviewed the results from the time of this admission to 9/9/2022 18:45 EDT and agree with these findings:  [x]  Laboratory  [x]  Microbiology  [x]  Radiology  []  EKG/Telemetry   []  Cardiology/Vascular   []  Pathology  []  Old records  []  Other:  Most notable findings include: Elevated levels of Dilantin phenobarb and Keppra    Wounds (last 24 hours)     LDA Wound     Row Name 09/09/22 1200 09/09/22 0800 09/09/22 0430       Wound 09/08/22 1820 sacral spine Pressure Injury    Wound - Properties Group Placement Date: 09/08/22  -ER Placement Time: 1820   -ER Present on Hospital Admission: Y  -ER Location: sacral spine  -ER Primary Wound Type: Pressure inj  -ER    Closure ULYSSES  -MS ULYSSES  -MS ULYSSES  -LM    Base dressing in place, unable to visualize  -MS dressing in place, unable to visualize  -MS dressing in place, unable to visualize  -LM    Retired Wound - Properties Group Placement Date: 09/08/22  -ER Placement Time: 1820  -ER Present on Hospital Admission: Y  -ER Location: sacral spine  -ER Primary Wound Type: Pressure inj  -ER    Retired Wound - Properties Group Date first assessed: 09/08/22  -ER Time first assessed: 1820  -ER Present on Hospital Admission: Y  -ER Location: sacral spine  -ER Primary Wound Type: Pressure inj  -ER       Wound 09/08/22 1821 Right posterior greater trochanter    Wound - Properties Group Placement Date: 09/08/22  -ER Placement Time: 1821  -ER Side: Right  -ER Orientation: posterior  -ER Location: greater trochanter  -ER    Closure ULYSSES  -MS ULYSSES  -MS ULYSSES  -LM    Base dressing in place, unable to visualize  -MS dressing in place, unable to visualize  -MS dressing in place, unable to visualize  -LM    Retired Wound - Properties Group Placement Date: 09/08/22  -ER Placement Time: 1821  -ER Side: Right  -ER Orientation: posterior  -ER Location: greater trochanter  -ER    Retired Wound - Properties Group Date first assessed: 09/08/22  -ER Time first assessed: 1821  -ER Side: Right  -ER Location: greater trochanter  -ER    Row Name 09/08/22 2000             Wound 09/08/22 1820 sacral spine Pressure Injury    Wound - Properties Group Placement Date: 09/08/22  -ER Placement Time: 1820  -ER Present on Hospital Admission: Y  -ER Location: sacral spine  -ER Primary Wound Type: Pressure inj  -ER      Closure ULYSSES  -LM      Base dressing in place, unable to visualize  -LM      Retired Wound - Properties Group Placement Date: 09/08/22  -ER Placement Time: 1820  -ER Present on Hospital Admission: Y  -ER Location: sacral spine  -ER Primary Wound Type:  Pressure inj  -ER      Retired Wound - Properties Group Date first assessed: 09/08/22  -ER Time first assessed: 1820  -ER Present on Hospital Admission: Y  -ER Location: sacral spine  -ER Primary Wound Type: Pressure inj  -ER              Wound 09/08/22 1821 Right posterior greater trochanter    Wound - Properties Group Placement Date: 09/08/22  -ER Placement Time: 1821  -ER Side: Right  -ER Orientation: posterior  -ER Location: greater trochanter  -ER      Closure ULYSSES  -LM      Base dressing in place, unable to visualize  -LM      Retired Wound - Properties Group Placement Date: 09/08/22  -ER Placement Time: 1821  -ER Side: Right  -ER Orientation: posterior  -ER Location: greater trochanter  -ER      Retired Wound - Properties Group Date first assessed: 09/08/22  -ER Time first assessed: 1821  -ER Side: Right  -ER Location: greater trochanter  -ER            User Key  (r) = Recorded By, (t) = Taken By, (c) = Cosigned By    Initials Name Provider Type    ER Anh Cormier RN Registered Nurse    Cha Upton RN Registered Nurse    Shruthi Whittington RN Registered Nurse                  Assessment & Plan      Brief Patient Summary:  Jeremiah Henson is a 71 y.o. female who presented from an extended care facility with chief complaint of alteration in mentation.  Unfortunately the patient is not able to give me much of a history.  The patient has issues with electrolytes and a past medical history of seizure disorder.  At the time of my visit the patient was really not able to answer any questions.      [START ON 9/10/2022] cefTRIAXone, 2 g, Intravenous, Q24H  enoxaparin, 1 mg/kg, Subcutaneous, Q12H  sodium chloride, 10 mL, Intravenous, Q12H       dextrose 5 % and sodium chloride 0.45 %, 125 mL/hr, Last Rate: 125 mL/hr (09/09/22 1716)         Active Hospital Problems:  Active Hospital Problems    Diagnosis    • Altered mental status, unspecified altered mental status type      Plan:   Altered mental  status--likely metabolic encephalopathy related to acute kidney injury, hypernatremia; UTI; consideration for elevated Dilantin level: Monitor for improvement with electrolyte improvement and treatment for UTI     UTI: IV Rocephin; add urine culture to urinalysis specimen  -Blood cultures pending  -Lactate negative at 0.8     MEGAN, creatinine 1.48 with comparison 0.65--likely secondary to decreased oral intake: Bladder scan x1; IV fluids D5 and a half at 125; repeat BMP in a.m.  -Patient received 1500 cc normal saline in ER  -IV fluids D5 and a half for maintenance secondary to hypernatremia, mild, sodium 146  -Anion gap 18; CO2 24     Elevated phenytoin level 21.6--likely secondary to dehydration, acute kidney injury: Hold Dilantin  -Consider mild decrease in Dilantin on discharge     Atrial fibrillation, chronic with elevated digoxin level: Hold digoxin; hold Eliquis; bridge Lovenox based on creatinine clearance 36 at 80 mg/kg every 12 hours; hold metoprolol secondary to borderline blood pressure; check EKG  -Digoxin level 1.40     Hypertension, chronic, uncontrolled with current hypotension: Hold Norvasc; hold lisinopril; hold metoprolol; hold hydralazine; cautious IV fluids     HLD, chronic: Hold Lipitor     Chronic pain: Hold baclofen secondary to altered mental status; hold Norco     Anxiety, chronic: Hold BuSpar secondary to altered mental status; hold Zoloft secondary to altered mental status     Seizure disorder, chronic: Continue Depakote; continue Keppra; hold Dilantin  -Valproic acid level 15  -Dilantin level 21.6  -We will add as needed Ativan for breakthrough seizure    Hypertriglyceridemia: Hold Tricor     Hypernatremia  -Changed to D5W at 75 mL an hour and check basic metabolic profile every 8 hours.  Need to lower the patient's serum sodium but gradually.  The patient is not able to eat or drink at this time so we will need to do this with IV hydration.    DVT prophylaxis:  Medical DVT prophylaxis  orders are present.    CODE STATUS:    Level Of Support Discussed With: Patient  Code Status (Patient has no pulse and is not breathing): CPR (Attempt to Resuscitate)  Medical Interventions (Patient has pulse or is breathing): Full Support      Disposition:  I expect patient to be discharged when clinically appropriate.    This patient has been examined wearing appropriate Personal Protective Equipment and discussed with hospital infection control department. 09/09/22      Electronically signed by Valarie Hermosillo MD, 09/09/22, 18:45 EDT.  [facility] Hospitalist Team

## 2022-09-09 NOTE — H&P
[facility] Medicine Services      Patient Name: Jeremiah Henson  : 1950  MRN: [id number]  Primary Care Physician:  Anna Barkley MD  Date of admission: 2022      Subjective      Chief Complaint: Altered mental status     History of Present Illness: Jeremiah Henson is a 71 y.o. female who presented to [facility] on 2022 from the [facility] complaining of altered mental status as noted by [facility] staff.  At time of interview the patient moans and calls for her mother.  The patient is noted to have dry mucosa.  She moves her extremities with weakness continuously but not to command.  She crosses her left leg over her right.  Where her left leg touches her right there is an area consistent with an old wound, possibly pressure wound that is completely healed.  Patient is unable to provide any information about her recent review of systems or past medical history.      Review of Systems   Unable to perform ROS: acuity of condition       Personal History     Past Medical History:   Diagnosis Date   • CVA (cerebral vascular accident) (HCC)    • Hyperlipidemia    • Hypertension    • Seizure (HCC)    • Sleep apnea        Past Surgical History:   Procedure Laterality Date   • COLONOSCOPY N/A 2020    Procedure: COLONOSCOPY WITH POLYPECTOMY X 2;  Surgeon: Abelardo Garay MD;  Location: [facility];  Service: Gastroenterology;  Laterality: N/A;  COLON POLYPS   • HIP SURGERY     • PACEMAKER IMPLANTATION         Family History: family history includes Heart attack in her father; Lung cancer in her father. Otherwise pertinent FHx was reviewed and not pertinent to current issue.    Social History:  reports that she has quit smoking. She quit after 20.00 years of use. She has never used smokeless tobacco. She reports that she does not drink alcohol and does not use drugs.    Home Medications:  Prior to Admission Medications     Prescriptions Last Dose Informant Patient  Reported? Taking?    acetaminophen (TYLENOL) 325 MG tablet  Nursing Home Yes No    Take 650 mg by mouth Every 8 (Eight) Hours As Needed for Mild Pain , Moderate Pain  or Fever.    acetaminophen (Tylenol) 325 MG tablet  Nursing Home Yes No    Take 650 mg by mouth 2 (Two) Times a Day.    amLODIPine (NORVASC) 10 MG tablet  Nursing Home Yes No    Take 10 mg by mouth Every Night.    atorvastatin (LIPITOR) 10 MG tablet  Nursing Home Yes No    Take 10 mg by mouth Every Night.    baclofen (LIORESAL) 10 MG tablet  Nursing Home Yes No    15 mg 3 (Three) Times a Day.    bisacodyl (DULCOLAX) 10 MG suppository  Nursing Home Yes No    Insert 10 mg into the rectum Daily As Needed.    busPIRone (BUSPAR) 5 MG tablet  Nursing Home Yes No    Take 5 mg by mouth 2 (Two) Times a Day.    Cholecalciferol (Vitamin D3) 50 MCG (2000 UT) tablet  Nursing Home Yes No    Take  by mouth Daily.    Diclofenac Sodium (VOLTAREN) 1 % gel gel  Nursing Home Yes No    2 (Two) Times a Day. Right Knee/ Right Shoulder    digoxin (LANOXIN) 250 MCG tablet  Nursing Home Yes No    Take 250 mcg by mouth Daily. Hold For Pulse <60    divalproex (DEPAKOTE) 125 MG DR tablet  Nursing Home Yes No    Take 250 mg by mouth 2 (Two) Times a Day.    docusate sodium (COLACE) 100 MG capsule  Nursing Home Yes No    Take 100 mg by mouth 2 (Two) Times a Day.    Eliquis 5 MG tablet tablet  Nursing Home Yes No    Take 5 mg by mouth 2 (Two) Times a Day.    fenofibrate (TRICOR) 48 MG tablet  Nursing Home Yes No    Take 48 mg by mouth Daily.    hydrALAZINE (APRESOLINE) 25 MG tablet  Nursing Home Yes No    Take 25 mg by mouth Daily. Total dose = 75 mg; give with 50 mg tabs   morning    hydrALAZINE (APRESOLINE) 50 MG tablet  Nursing Home Yes No    Take 50 mg by mouth every night at bedtime.    HYDROcodone-acetaminophen (NORCO) 5-325 MG per tablet  Nursing Home Yes No    Take 1 tablet by mouth 2 (Two) Times a Day.    levETIRAcetam (KEPPRA) 500 MG tablet  Nursing Home Yes No    Take 500  mg by mouth 2 (Two) Times a Day.    Lidocaine 4 % patch  Nursing Home Yes No    Apply  topically Daily. Right Shoulder    lisinopril (PRINIVIL,ZESTRIL) 40 MG tablet  Nursing Home Yes No    Take 40 mg by mouth Daily.    Melatonin 3 MG tablet  Nursing Home Yes No    Take 3 mg by mouth Every Night.    metoprolol tartrate (LOPRESSOR) 100 MG tablet  Nursing Home Yes No    Take 100 mg by mouth Daily.    Omega-3 1000 MG capsule  Nursing Home Yes No    Take 1 capsule by mouth 2 (two) times a day.    phenytoin extended (DILANTIN) 200 MG ER capsule  Nursing Home Yes No    Take 200 mg by mouth 2 (Two) Times a Day.    polyethylene glycol (MIRALAX) 17 g packet  Nursing Home Yes No    Take 17 g by mouth Daily.    senna (senna) 8.6 MG tablet  Nursing Home Yes No    Take 2 tablets by mouth 2 (Two) Times a Day.    sertraline (ZOLOFT) 50 MG tablet  Nursing Home Yes No    Take 50 mg by mouth Daily.            Allergies:  No Known Allergies    Objective      Vitals:   Temp:  [96.2 °F (35.7 °C)-97.9 °F (36.6 °C)] 97.6 °F (36.4 °C)  Heart Rate:  [59-95] 68  Resp:  [12-16] 12  BP: ()/(32-77) 91/40    Physical Exam  Vitals and nursing note reviewed.   Constitutional:       General: She is not in acute distress.     Appearance: She is ill-appearing. She is not toxic-appearing or diaphoretic.   HENT:      Head: Normocephalic and atraumatic.      Right Ear: External ear normal.      Left Ear: External ear normal.      Nose: Nose normal. No congestion or rhinorrhea.      Mouth/Throat:      Mouth: Mucous membranes are dry.   Eyes:      General: No scleral icterus.     Pupils: Pupils are equal, round, and reactive to light.   Neck:      Vascular: No carotid bruit.   Cardiovascular:      Rate and Rhythm: Normal rate and regular rhythm.      Heart sounds: No murmur heard.  Pulmonary:      Effort: No accessory muscle usage, prolonged expiration or respiratory distress.      Comments: There is mild facial grimace with moderate palpation of the  chest  Chest:      Chest wall: Tenderness present.   Abdominal:      General: Bowel sounds are normal.      Palpations: Abdomen is soft.      Tenderness: There is no abdominal tenderness.      Comments: No facial grimace with palpation of the abdomen   Musculoskeletal:         General: Swelling present.      Cervical back: Neck supple. No rigidity.      Comments: There is trace edema in the bilateral lower extremities up to the thighs; the right foot has 3+ edema; the left foot has trace edema; the bilateral feet have crusted skin on the soles of the feet; the feet are not dirty   Skin:     General: Skin is warm and dry.      Capillary Refill: Capillary refill takes less than 2 seconds.      Coloration: Skin is pale.      Comments: The skin is pale   Neurological:      Mental Status: She is lethargic.   Psychiatric:         Attention and Perception: She is inattentive.         Speech: She is noncommunicative.         Cognition and Memory: Cognition is impaired.      Comments: The patient calls for her mother but otherwise moans and does not attempt to communicate         Result Review    Result Review:  I have personally reviewed the results from the time of this admission to 9/8/2022 22:34 EDT and agree with these findings:  [x]  Laboratory  [x]  Microbiology  [x]  Radiology  [x]  EKG/Telemetry   []  Cardiology/Vascular   []  Pathology  [x]  Old records  []  Other:  Most notable findings include: Elevated phenytoin level; acute kidney injury    Assessment & Plan        Active Hospital Problems:  Active Hospital Problems    Diagnosis    • Altered mental status, unspecified altered mental status type      Plan:     Altered mental status--likely metabolic encephalopathy related to acute kidney injury, hypernatremia; UTI; consideration for elevated Dilantin level: Monitor for improvement with electrolyte improvement and treatment for UTI    UTI: IV Rocephin; add urine culture to urinalysis specimen  -Blood cultures  pending  -Lactate negative at 0.8    MEGAN, creatinine 1.48 with comparison 0.65--likely secondary to decreased oral intake: Bladder scan x1; IV fluids D5 and a half at 125; repeat BMP in a.m.  -Patient received 1500 cc normal saline in ER  -IV fluids D5 and a half for maintenance secondary to hypernatremia, mild, sodium 146  -Anion gap 18; CO2 24    Elevated phenytoin level 21.6--likely secondary to dehydration, acute kidney injury: Hold Dilantin  -Consider mild decrease in Dilantin on discharge    Atrial fibrillation, chronic with elevated digoxin level: Hold digoxin; hold Eliquis; bridge Lovenox based on creatinine clearance 36 at 80 mg/kg every 12 hours; hold metoprolol secondary to borderline blood pressure; check EKG  -Digoxin level 1.40    Hypertension, chronic, uncontrolled with current hypotension: Hold Norvasc; hold lisinopril; hold metoprolol; hold hydralazine; cautious IV fluids    HLD, chronic: Hold Lipitor    Chronic pain: Hold baclofen secondary to altered mental status; hold Norco    Anxiety, chronic: Hold BuSpar secondary to altered mental status; hold Zoloft secondary to altered mental status    Seizure disorder, chronic: Continue Depakote; continue Keppra; hold Dilantin  -Valproic acid level 15  -Dilantin level 21.6    Hypertriglyceridemia: Hold Tricor      DVT prophylaxis:  Medical DVT prophylaxis orders are present.    CODE STATUS:    Level Of Support Discussed With: Patient  Code Status (Patient has no pulse and is not breathing): CPR (Attempt to Resuscitate)  Medical Interventions (Patient has pulse or is breathing): Full Support    Admission Status:  I believe this patient meets observation status.    I discussed the patient's findings and my recommendations with patient and nursing staff.    This patient has been examined wearing appropriate Personal Protective Equipment. 09/08/22      Signature: Electronically signed by JESUS Fowler, 09/08/22, 11:12 PM EDT.

## 2022-09-09 NOTE — PAYOR COMM NOTE
"This submission is an INPATIENT prior authorization request, please see attached PA form and clinical.    PT originally admitted from ER in Observation status on 9/8, changed to IP status on 9/9/22    AUTHORIZATION PENDING:   Please call or fax determination to contact below.   Thank you.    Temitope Merritt, RN, BSN  Utilization Review Nurse  [facility]  Direct & confidential phone # [phone]  Fax # [phone]  ====================                        Jeremiah Henson (71 y.o. Female)             Date of Birth   1950    Social Security Number       Address   [address] [place] [address] [address] [place] [address] [place] [facility]    Home Phone   [phone]    MRN   [id number]       Church   None    Marital Status                               Admission Date   9/8/22    Admission Type   Emergency    Admitting Provider   Valarie Hermosillo MD    Attending Provider   Valarie Hermosillo MD    Department, Room/Bed   [facility] EMERGENCY DEPARTMENT, EDH2/2       Discharge Date       Discharge Disposition       Discharge Destination                               Attending Provider: Valarie Hermosillo MD    Allergies: No Known Allergies    Isolation: None   Infection: None   Code Status: CPR   Advance Care Planning Activity    Ht: 165.1 cm (65\")   Wt: 84.8 kg (186 lb 15.2 oz)    Admission Cmt: None   Principal Problem: None                Active Insurance as of 9/8/2022     Primary Coverage     Payor Plan Insurance Group Employer/Plan Group    MISC MEDICARE REPLACEMENT MISC MCARE REPLACEMENT [id number]     Coverage Address Coverage Phone Number Coverage Fax Number Effective Dates    [address] [facility] [phone]  1/1/2022 - None Entered    [address]       [facility] [id number]       Subscriber Name Subscriber Birth Date Member ID       JEREMIAH HENSON 1950 [id number]           Secondary Coverage     Payor Plan Insurance Group Employer/Plan Group    [place]      Payor Plan " Address Payor Plan Phone Number Payor Plan Fax Number Effective Dates    [address]   2019 - None Entered    [place] [address]       Subscriber Name Subscriber Birth Date Member ID       TAQUERIA HENSON 1950 [id number]                 Emergency Contacts      (Rel.) Home Phone Work Phone Mobile Phone    PRAVEEN PARSONS (Daughter) -- -- [phone]               History & Physical      Heidi Lyons APRN at 22     Attestation signed by Valarie Hermosillo MD at 22 1332    Attending Physician Attestation    I have reviewed the mid-level provider documentation, agree with the documentation, medical decision making and treatment plan as outlined by the mid-level provider. I have reviewed this documentation and agree.                      [facility]      Patient Name: Taqueria Henson  : 1950  MRN: [id number]  Primary Care Physician:  Anna Barkley MD  Date of admission: 2022      Subjective      Chief Complaint: Altered mental status     History of Present Illness: Taqueria Henson is a 71 y.o. female who presented to [facility] on 2022 from the [facility] complaining of altered mental status as noted by [facility] staff.  At time of interview the patient moans and calls for her mother.  The patient is noted to have dry mucosa.  She moves her extremities with weakness continuously but not to command.  She crosses her left leg over her right.  Where her left leg touches her right there is an area consistent with an old wound, possibly pressure wound that is completely healed.  Patient is unable to provide any information about her recent review of systems or past medical history.      Review of Systems   Unable to perform ROS: acuity of condition       Personal History     Past Medical History:   Diagnosis Date   • CVA (cerebral vascular accident) (HCC)    • Hyperlipidemia    • Hypertension    • Seizure (HCC)    • Sleep  apnea        Past Surgical History:   Procedure Laterality Date   • COLONOSCOPY N/A 11/19/2020    Procedure: COLONOSCOPY WITH POLYPECTOMY X 2;  Surgeon: Abelardo Garay MD;  Location: [facility] ENDOSCOPY;  Service: Gastroenterology;  Laterality: N/A;  COLON POLYPS   • HIP SURGERY     • PACEMAKER IMPLANTATION         Family History: family history includes Heart attack in her father; Lung cancer in her father. Otherwise pertinent FHx was reviewed and not pertinent to current issue.    Social History:  reports that she has quit smoking. She quit after 20.00 years of use. She has never used smokeless tobacco. She reports that she does not drink alcohol and does not use drugs.    Home Medications:  Prior to Admission Medications     Prescriptions Last Dose Informant Patient Reported? Taking?    acetaminophen (TYLENOL) 325 MG tablet  Nursing Home Yes No    Take 650 mg by mouth Every 8 (Eight) Hours As Needed for Mild Pain , Moderate Pain  or Fever.    acetaminophen (Tylenol) 325 MG tablet  Nursing Home Yes No    Take 650 mg by mouth 2 (Two) Times a Day.    amLODIPine (NORVASC) 10 MG tablet  Nursing Home Yes No    Take 10 mg by mouth Every Night.    atorvastatin (LIPITOR) 10 MG tablet  Nursing Home Yes No    Take 10 mg by mouth Every Night.    baclofen (LIORESAL) 10 MG tablet  Nursing Home Yes No    15 mg 3 (Three) Times a Day.    bisacodyl (DULCOLAX) 10 MG suppository  Nursing Home Yes No    Insert 10 mg into the rectum Daily As Needed.    busPIRone (BUSPAR) 5 MG tablet  Nursing Home Yes No    Take 5 mg by mouth 2 (Two) Times a Day.    Cholecalciferol (Vitamin D3) 50 MCG (2000 UT) tablet  Nursing Home Yes No    Take  by mouth Daily.    Diclofenac Sodium (VOLTAREN) 1 % gel gel  Nursing Home Yes No    2 (Two) Times a Day. Right Knee/ Right Shoulder    digoxin (LANOXIN) 250 MCG tablet  Nursing Home Yes No    Take 250 mcg by mouth Daily. Hold For Pulse <60    divalproex (DEPAKOTE) 125 MG DR tablet  Nursing Home Yes No     Take 250 mg by mouth 2 (Two) Times a Day.    docusate sodium (COLACE) 100 MG capsule  Nursing Home Yes No    Take 100 mg by mouth 2 (Two) Times a Day.    Eliquis 5 MG tablet tablet  Nursing Home Yes No    Take 5 mg by mouth 2 (Two) Times a Day.    fenofibrate (TRICOR) 48 MG tablet  Nursing Home Yes No    Take 48 mg by mouth Daily.    hydrALAZINE (APRESOLINE) 25 MG tablet  Nursing Home Yes No    Take 25 mg by mouth Daily. Total dose = 75 mg; give with 50 mg tabs   morning    hydrALAZINE (APRESOLINE) 50 MG tablet  Nursing Home Yes No    Take 50 mg by mouth every night at bedtime.    HYDROcodone-acetaminophen (NORCO) 5-325 MG per tablet  Nursing Home Yes No    Take 1 tablet by mouth 2 (Two) Times a Day.    levETIRAcetam (KEPPRA) 500 MG tablet  Nursing Home Yes No    Take 500 mg by mouth 2 (Two) Times a Day.    Lidocaine 4 % patch  Nursing Home Yes No    Apply  topically Daily. Right Shoulder    lisinopril (PRINIVIL,ZESTRIL) 40 MG tablet  Nursing Home Yes No    Take 40 mg by mouth Daily.    Melatonin 3 MG tablet  Nursing Home Yes No    Take 3 mg by mouth Every Night.    metoprolol tartrate (LOPRESSOR) 100 MG tablet  Nursing Home Yes No    Take 100 mg by mouth Daily.    Omega-3 1000 MG capsule  Nursing Home Yes No    Take 1 capsule by mouth 2 (two) times a day.    phenytoin extended (DILANTIN) 200 MG ER capsule  Nursing Home Yes No    Take 200 mg by mouth 2 (Two) Times a Day.    polyethylene glycol (MIRALAX) 17 g packet  Nursing Home Yes No    Take 17 g by mouth Daily.    senna (senna) 8.6 MG tablet  Nursing Home Yes No    Take 2 tablets by mouth 2 (Two) Times a Day.    sertraline (ZOLOFT) 50 MG tablet  Nursing Home Yes No    Take 50 mg by mouth Daily.            Allergies:  No Known Allergies    Objective      Vitals:   Temp:  [96.2 °F (35.7 °C)-97.9 °F (36.6 °C)] 97.6 °F (36.4 °C)  Heart Rate:  [59-95] 68  Resp:  [12-16] 12  BP: ()/(32-77) 91/40    Physical Exam  Vitals and nursing note reviewed.    Constitutional:       General: She is not in acute distress.     Appearance: She is ill-appearing. She is not toxic-appearing or diaphoretic.   HENT:      Head: Normocephalic and atraumatic.      Right Ear: External ear normal.      Left Ear: External ear normal.      Nose: Nose normal. No congestion or rhinorrhea.      Mouth/Throat:      Mouth: Mucous membranes are dry.   Eyes:      General: No scleral icterus.     Pupils: Pupils are equal, round, and reactive to light.   Neck:      Vascular: No carotid bruit.   Cardiovascular:      Rate and Rhythm: Normal rate and regular rhythm.      Heart sounds: No murmur heard.  Pulmonary:      Effort: No accessory muscle usage, prolonged expiration or respiratory distress.      Comments: There is mild facial grimace with moderate palpation of the chest  Chest:      Chest wall: Tenderness present.   Abdominal:      General: Bowel sounds are normal.      Palpations: Abdomen is soft.      Tenderness: There is no abdominal tenderness.      Comments: No facial grimace with palpation of the abdomen   Musculoskeletal:         General: Swelling present.      Cervical back: Neck supple. No rigidity.      Comments: There is trace edema in the bilateral lower extremities up to the thighs; the right foot has 3+ edema; the left foot has trace edema; the bilateral feet have crusted skin on the soles of the feet; the feet are not dirty   Skin:     General: Skin is warm and dry.      Capillary Refill: Capillary refill takes less than 2 seconds.      Coloration: Skin is pale.      Comments: The skin is pale   Neurological:      Mental Status: She is lethargic.   Psychiatric:         Attention and Perception: She is inattentive.         Speech: She is noncommunicative.         Cognition and Memory: Cognition is impaired.      Comments: The patient calls for her mother but otherwise moans and does not attempt to communicate         Result Review    Result Review:  I have personally reviewed  the results from the time of this admission to 9/8/2022 22:34 EDT and agree with these findings:  [x]  Laboratory  [x]  Microbiology  [x]  Radiology  [x]  EKG/Telemetry   []  Cardiology/Vascular   []  Pathology  [x]  Old records  []  Other:  Most notable findings include: Elevated phenytoin level; acute kidney injury    Assessment & Plan        Active Hospital Problems:  Active Hospital Problems    Diagnosis    • Altered mental status, unspecified altered mental status type      Plan:     Altered mental status--likely metabolic encephalopathy related to acute kidney injury, hypernatremia; UTI; consideration for elevated Dilantin level: Monitor for improvement with electrolyte improvement and treatment for UTI    UTI: IV Rocephin; add urine culture to urinalysis specimen  -Blood cultures pending  -Lactate negative at 0.8    MEGAN, creatinine 1.48 with comparison 0.65--likely secondary to decreased oral intake: Bladder scan x1; IV fluids D5 and a half at 125; repeat BMP in a.m.  -Patient received 1500 cc normal saline in ER  -IV fluids D5 and a half for maintenance secondary to hypernatremia, mild, sodium 146  -Anion gap 18; CO2 24    Elevated phenytoin level 21.6--likely secondary to dehydration, acute kidney injury: Hold Dilantin  -Consider mild decrease in Dilantin on discharge    Atrial fibrillation, chronic with elevated digoxin level: Hold digoxin; hold Eliquis; bridge Lovenox based on creatinine clearance 36 at 80 mg/kg every 12 hours; hold metoprolol secondary to borderline blood pressure; check EKG  -Digoxin level 1.40    Hypertension, chronic, uncontrolled with current hypotension: Hold Norvasc; hold lisinopril; hold metoprolol; hold hydralazine; cautious IV fluids    HLD, chronic: Hold Lipitor    Chronic pain: Hold baclofen secondary to altered mental status; hold Norco    Anxiety, chronic: Hold BuSpar secondary to altered mental status; hold Zoloft secondary to altered mental status    Seizure disorder,  "chronic: Continue Depakote; continue Keppra; hold Dilantin  -Valproic acid level 15  -Dilantin level 21.6    Hypertriglyceridemia: Hold Tricor      DVT prophylaxis:  Medical DVT prophylaxis orders are present.    CODE STATUS:    Level Of Support Discussed With: Patient  Code Status (Patient has no pulse and is not breathing): CPR (Attempt to Resuscitate)  Medical Interventions (Patient has pulse or is breathing): Full Support    Admission Status:  I believe this patient meets observation status.    I discussed the patient's findings and my recommendations with patient and nursing staff.    This patient has been examined wearing appropriate Personal Protective Equipment. 09/08/22      Signature: Electronically signed by JESUS Fowler, 09/08/22, 11:12 PM EDT.      Electronically signed by Valarie Hermosillo MD at 09/09/22 1332          Emergency Department Notes      Brianna Spear APRN at 09/08/22 1140     Attestation signed by Bairon Capellan DO at 09/08/22 1557        NON FACE TO FACE: This visit was performed by BOTH a physician and an APC. I performed all aspects of the MDM as documented.  Bairon Capellan DO 9/8/2022 15:57 EDT                         Subjective   PIT    71-year-old pleasantly demented, with communication barrier female presents from extended-care facility with reported worsening confusion.  The facility reports that she typically is able to answer simple questions and is only repeating \"mama\".      History provided by:  EMS personnel   used: No        Review of Systems   Unable to perform ROS: Dementia       Past Medical History:   Diagnosis Date   • CVA (cerebral vascular accident) (HCC)    • Hyperlipidemia    • Hypertension    • Seizure (HCC)    • Sleep apnea        No Known Allergies    Past Surgical History:   Procedure Laterality Date   • COLONOSCOPY N/A 11/19/2020    Procedure: COLONOSCOPY WITH POLYPECTOMY X 2;  Surgeon: Abelardo Garay MD;  [facility]: [facility];  Service: Gastroenterology;  Laterality: N/A;  COLON POLYPS   • HIP SURGERY     • PACEMAKER IMPLANTATION         Family History   Problem Relation Age of Onset   • Lung cancer Father    • Heart attack Father        Social History     Socioeconomic History   • Marital status:    Tobacco Use   • Smoking status: Former Smoker     Years: 20.00   • Smokeless tobacco: Never Used   Substance and Sexual Activity   • Alcohol use: No   • Drug use: No   • Sexual activity: Defer           Objective   Physical Exam  Vitals and nursing note reviewed.   Constitutional:       General: She is not in acute distress.     Appearance: She is well-developed. She is ill-appearing. She is not diaphoretic.   HENT:      Head: Normocephalic and atraumatic.      Right Ear: External ear normal.      Left Ear: External ear normal.      Nose: Nose normal.      Mouth/Throat:      Mouth: Mucous membranes are moist.      Pharynx: Oropharynx is clear.   Eyes:      Conjunctiva/sclera: Conjunctivae normal.      Pupils: Pupils are equal, round, and reactive to light.   Neck:      Trachea: Trachea and phonation normal.      Meningeal: Brudzinski's sign and Kernig's sign absent.   Cardiovascular:      Rate and Rhythm: Normal rate and regular rhythm.      Pulses: Normal pulses.      Heart sounds: Normal heart sounds, S1 normal and S2 normal. No murmur heard.    No friction rub. No gallop.   Pulmonary:      Effort: Pulmonary effort is normal.      Breath sounds: Normal breath sounds.   Abdominal:      General: Bowel sounds are normal.      Palpations: Abdomen is soft.      Tenderness: There is no abdominal tenderness.   Musculoskeletal:         General: Normal range of motion.      Cervical back: Full passive range of motion without pain, normal range of motion and neck supple. No erythema or rigidity. No spinous process tenderness. Normal range of motion.   Skin:     General: Skin is warm and dry.      Capillary Refill: Capillary  refill takes less than 2 seconds.   Neurological:      Mental Status: She is lethargic.      GCS: GCS eye subscore is 2. GCS verbal subscore is 4. GCS motor subscore is 5.   Psychiatric:         Thought Content: Thought content normal.         Cognition and Memory: Cognition is impaired (Cognitive disorder).         Judgment: Judgment normal.         Procedures          ED Course    CT Head Without Contrast    Result Date: 9/8/2022  No evidence of hemorrhage, mass effect or midline shift. No acute process identified. Stable chronic findings as above.  Electronically Signed By-Huong Dweyr MD On:9/8/2022 2:06 PM This report was finalized on [id number] by  Huong Dwyer MD.    XR Chest 1 View    Result Date: 9/8/2022   1. No acute cardiopulmonary findings.  2. Anterior inferior subluxation or dislocation of the right shoulder. This has a similar appearance to the chest x-ray from 10/5/2017. Correlate with physical exam findings. 3. Stable borderline cardiac enlargement. 4. Old right rib fractures.  Electronically Signed By-Taty Mack MD On:9/8/2022 12:55 PM This report was finalized on [id number] by  Taty Mack MD.    Medications   sodium chloride 0.9 % flush 10 mL (10 mL Intravenous Given 9/8/22 1323)   sodium chloride 0.9 % bolus 1,000 mL (0 mL Intravenous Stopped 9/8/22 1340)   cefTRIAXone (ROCEPHIN) 2 g in sodium chloride 0.9 % 100 mL IVPB (0 g Intravenous Stopped 9/8/22 1441)     Labs Reviewed   COMPREHENSIVE METABOLIC PANEL - Abnormal; Notable for the following components:       Result Value    Glucose 114 (*)     BUN 57 (*)     Creatinine 1.48 (*)     Sodium 146 (*)     Alkaline Phosphatase 36 (*)     BUN/Creatinine Ratio 38.5 (*)     Anion Gap 18.0 (*)     eGFR 37.2 (*)     All other components within normal limits    Narrative:     GFR Normal >60  Chronic Kidney Disease <60  Kidney Failure <15     URINALYSIS W/ MICROSCOPIC IF INDICATED (NO CULTURE) - Abnormal; Notable for the following  components:    Color, UA Dark Yellow (*)     Appearance, UA Turbid (*)     Ketones, UA 15 mg/dL (1+) (*)     Blood, UA Small (1+) (*)     Protein, UA >=300 mg/dL (3+) (*)     Leuk Esterase, UA Large (3+) (*)     All other components within normal limits   DIGOXIN LEVEL - Abnormal; Notable for the following components:    Digoxin 1.40 (*)     All other components within normal limits   VALPROIC ACID LEVEL, TOTAL - Abnormal; Notable for the following components:    Valproic Acid 15.7 (*)     All other components within normal limits    Narrative:     Therapeutic Ranges for Valproic Acid    Epilepsy:       mcg/ml  Bipolar/Meli  up to 125 mcg/ml     PHENYTOIN LEVEL, TOTAL - Abnormal; Notable for the following components:    Phenytoin Level 21.6 (*)     All other components within normal limits   CBC WITH AUTO DIFFERENTIAL - Abnormal; Notable for the following components:    RBC 3.53 (*)     Hemoglobin 11.8 (*)     .2 (*)     MCH 33.4 (*)     All other components within normal limits   URINALYSIS, MICROSCOPIC ONLY - Abnormal; Notable for the following components:    RBC, UA 6-12 (*)     WBC, UA Too Numerous to Count (*)     Bacteria, UA 3+ (*)     Squamous Epithelial Cells, UA 3-6 (*)     All other components within normal limits   POCT GLUCOSE FINGERSTICK - Abnormal; Notable for the following components:    Glucose 109 (*)     All other components within normal limits   PROCALCITONIN - Normal    Narrative:     As a Marker for Sepsis (Non-Neonates):    1. <0.5 ng/mL represents a low risk of severe sepsis and/or septic shock.  2. >2 ng/mL represents a high risk of severe sepsis and/or septic shock.    As a Marker for Lower Respiratory Tract Infections that require antibiotic therapy:    PCT on Admission    Antibiotic Therapy       6-12 Hrs later    >0.5                Strongly Recommended  >0.25 - <0.5        Recommended   0.1 - 0.25          Discouraged              Remeasure/reassess PCT  <0.1               "  Strongly Discouraged     Remeasure/reassess PCT    As 28 day mortality risk marker: \"Change in Procalcitonin Result\" (>80% or <=80%) if Day 0 (or Day 1) and Day 4 values are available. Refer to http://www.[facility]-pct-calculator.com    Change in PCT <=80%  A decrease of PCT levels below or equal to 80% defines a positive change in PCT test result representing a higher risk for 28-day all-cause mortality of patients diagnosed with severe sepsis for septic shock.    Change in PCT >80%  A decrease of PCT levels of more than 80% defines a negative change in PCT result representing a lower risk for 28-day all-cause mortality of patients diagnosed with severe sepsis or septic shock.      POC LACTATE - Normal   BLOOD CULTURE   BLOOD CULTURE   RAINBOW DRAW    Narrative:     The following orders were created for panel order [place] Draw.  Procedure                               Abnormality         Status                     ---------                               -----------         ------                     Green Top (Gel)[[id number]]                                  Final result               Lavender Top[[id number]]                                     Final result               Gold Top - SST[[id number]]                                   Final result               Light Blue Top[[id number]]                                   Final result                 Please view results for these tests on the individual orders.   POCT GLUCOSE FINGERSTICK   POC LACTATE   CBC AND DIFFERENTIAL    Narrative:     The following orders were created for panel order CBC & Differential.  Procedure                               Abnormality         Status                     ---------                               -----------         ------                     CBC Auto Differential[[id number]]        Abnormal            Final result                 Please view results for these tests on the individual orders.   GREEN TOP   LAVENDER TOP   GOLD TOP - SST " "  LIGHT BLUE TOP                                            MDM  Number of Diagnoses or Management Options  Acute cystitis with hematuria  MEGAN (acute kidney injury) (HCC)  Altered mental status, unspecified altered mental status type  Elevated digoxin level  Elevated phenytoin level  On valproic acid therapy  Diagnosis management comments: Chart Review: 1/19/2022 patient was seen at this facility status post fall for hip contusion.  Comorbidity: Past Medical History:  No date: CVA (cerebral vascular accident) (HCC)  No date: Hyperlipidemia  No date: Hypertension  No date: Seizure (HCC)  No date: Sleep apnea  Imaging: Was interpreted by physician and reviewed by myself: CT Head Without Contrast   Final Result    No evidence of hemorrhage, mass effect or midline shift. No acute    process identified. Stable chronic findings as above.         Electronically Signed By-Huong Dwyer MD On:9/8/2022 2:06 PM    This report was finalized on [id number] by  Huong Dwyer MD.     XR Chest 1 View   Final Result         1. No acute cardiopulmonary findings.     2. Anterior inferior subluxation or dislocation of the right shoulder.    This has a similar appearance to the chest x-ray from 10/5/2017.    Correlate with physical exam findings.    3. Stable borderline cardiac enlargement.    4. Old right rib fractures.         Electronically Signed By-Taty Mack MD On:9/8/2022 12:55 PM    This report was finalized on [id number] by  Taty Mack MD.    Patient undressed and placed in gown for exam.  Appropriate PPE worn during patient exam.  Patient is noted to be lethargic on exam.  She is arousable with tactile stimuli.  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  She is also diagnosed with dementia.  Patient answers all questions with \"mama\".  Blood cell count 3600 platelets 268 hemoglobin 11.8 hematocrit 35.8.  Sodium 146 potassium 4.5 " chloride 104 bicarb 24 BUN 57 creatinine 1.48 glucose 114.  Urine was significant for ketones, small blood, protein, leukoesterase, 6-12 red cells, too numerous white cells to count, and 3+ bacteria.  Lactic 0.8.  Blood cultures pending.  Patient was given Rocephin 2 g IV.  Procalcitonin 0.12.  Digoxin 1.4 phenytoin 21.6 valproic acid 15.7.  Patient's blood pressure dropped to the 80s systolic, 1 L normal saline bolus was given.  Upon reassessment her blood pressure is increased to the 130s over 70s.  Patient will be admitted for UTI.  The hospitalist was paged for admission.  Spoke with ANDRES Gordon excepted admission on behalf of Dr. Hermosillo.    Disposition/Treatment: Patient was stable upon admission.       Part of this note may be an electronic transcription/translation of spoken language to printed text using the Dragon Dictation System.            Amount and/or Complexity of Data Reviewed  Clinical lab tests: reviewed  Tests in the radiology section of CPT®: reviewed  Decide to obtain previous medical records or to obtain history from someone other than the patient: yes    Risk of Complications, Morbidity, and/or Mortality  General comments: This case was discussed with my attending, Dr. Capellan who is agreeable with plan of care.     Patient Progress  Patient progress: stable      Final diagnoses:   Altered mental status, unspecified altered mental status type   Acute cystitis with hematuria   Elevated digoxin level   On valproic acid therapy   Elevated phenytoin level   MEGAN (acute kidney injury) ([facility])       ED Disposition  ED Disposition     ED Disposition   Decision to Admit    Condition   --    Comment   --             No follow-up provider specified.       Medication List      No changes were made to your prescriptions during this visit.          Brianna Spear APRN  09/08/22 1452      Electronically signed by Bairon Capellan DO at 09/08/22 2377     Dolores Jiménez, RN at 09/08/22 1310        I have  remained with pt for b/p check, hot blanket application, and fluids to be hung.    Electronically signed by Dolores Jiménez RN at 09/08/22 1328     Dolores Jiménez RN at 09/08/22 1647        Pt's daughter in to see pt. She confirmed that pt normally is wakeful or wakes easily and is able to give opinions and ask basic questions, regardless of confusion.     Electronically signed by Dolores Jiménez RN at 09/08/22 1649     Dolores Jiménez RN at 09/08/22 1745        purewick applied to pt.    Electronically signed by Dolores Jiménez RN at 09/08/22 1746       Operative/Procedure Notes (last 48 hours)  Notes from 09/07/22 1538 through 09/09/22 1538   No notes of this type exist for this encounter.         Physician Progress Notes (last 48 hours)  Notes from 09/07/22 1538 through 09/09/22 1538   No notes of this type exist for this encounter.       Consult Notes (last 48 hours)  Notes from 09/07/22 1538 through 09/09/22 1538   No notes of this type exist for this encounter.

## 2022-09-10 LAB
BACTERIA SPEC AEROBE CULT: ABNORMAL
BASOPHILS # BLD AUTO: 0 10*3/MM3 (ref 0–0.2)
BASOPHILS # BLD AUTO: 0 10*3/MM3 (ref 0–0.2)
BASOPHILS NFR BLD AUTO: 0.6 % (ref 0–1.5)
BASOPHILS NFR BLD AUTO: 1 % (ref 0–1.5)
D-LACTATE SERPL-SCNC: 0.9 MMOL/L (ref 0.5–2)
DEPRECATED RDW RBC AUTO: 48.6 FL (ref 37–54)
DEPRECATED RDW RBC AUTO: 48.6 FL (ref 37–54)
EOSINOPHIL # BLD AUTO: 0 10*3/MM3 (ref 0–0.4)
EOSINOPHIL # BLD AUTO: 0 10*3/MM3 (ref 0–0.4)
EOSINOPHIL NFR BLD AUTO: 1.3 % (ref 0.3–6.2)
EOSINOPHIL NFR BLD AUTO: 1.3 % (ref 0.3–6.2)
ERYTHROCYTE [DISTWIDTH] IN BLOOD BY AUTOMATED COUNT: 14 % (ref 12.3–15.4)
ERYTHROCYTE [DISTWIDTH] IN BLOOD BY AUTOMATED COUNT: 14.2 % (ref 12.3–15.4)
GRAM STN SPEC: ABNORMAL
HCT VFR BLD AUTO: 33.1 % (ref 34–46.6)
HCT VFR BLD AUTO: 33.2 % (ref 34–46.6)
HGB BLD-MCNC: 11 G/DL (ref 12–15.9)
HGB BLD-MCNC: 11.1 G/DL (ref 12–15.9)
ISOLATED FROM: ABNORMAL
LYMPHOCYTES # BLD AUTO: 1.1 10*3/MM3 (ref 0.7–3.1)
LYMPHOCYTES # BLD AUTO: 1.5 10*3/MM3 (ref 0.7–3.1)
LYMPHOCYTES NFR BLD AUTO: 36.3 % (ref 19.6–45.3)
LYMPHOCYTES NFR BLD AUTO: 40.5 % (ref 19.6–45.3)
MCH RBC QN AUTO: 32.3 PG (ref 26.6–33)
MCH RBC QN AUTO: 33.1 PG (ref 26.6–33)
MCHC RBC AUTO-ENTMCNC: 33.1 G/DL (ref 31.5–35.7)
MCHC RBC AUTO-ENTMCNC: 33.6 G/DL (ref 31.5–35.7)
MCV RBC AUTO: 97.3 FL (ref 79–97)
MCV RBC AUTO: 98.3 FL (ref 79–97)
MONOCYTES # BLD AUTO: 0.3 10*3/MM3 (ref 0.1–0.9)
MONOCYTES # BLD AUTO: 0.5 10*3/MM3 (ref 0.1–0.9)
MONOCYTES NFR BLD AUTO: 10.5 % (ref 5–12)
MONOCYTES NFR BLD AUTO: 13.8 % (ref 5–12)
NEUTROPHILS NFR BLD AUTO: 1.6 10*3/MM3 (ref 1.7–7)
NEUTROPHILS NFR BLD AUTO: 1.6 10*3/MM3 (ref 1.7–7)
NEUTROPHILS NFR BLD AUTO: 43.4 % (ref 42.7–76)
NEUTROPHILS NFR BLD AUTO: 51.3 % (ref 42.7–76)
NRBC BLD AUTO-RTO: 0 /100 WBC (ref 0–0.2)
NRBC BLD AUTO-RTO: 0.2 /100 WBC (ref 0–0.2)
PLATELET # BLD AUTO: 190 10*3/MM3 (ref 140–450)
PLATELET # BLD AUTO: 206 10*3/MM3 (ref 140–450)
PMV BLD AUTO: 8.2 FL (ref 6–12)
PMV BLD AUTO: 8.3 FL (ref 6–12)
RBC # BLD AUTO: 3.36 10*6/MM3 (ref 3.77–5.28)
RBC # BLD AUTO: 3.41 10*6/MM3 (ref 3.77–5.28)
WBC NRBC COR # BLD: 3.1 10*3/MM3 (ref 3.4–10.8)
WBC NRBC COR # BLD: 3.6 10*3/MM3 (ref 3.4–10.8)

## 2022-09-10 PROCEDURE — 83605 ASSAY OF LACTIC ACID: CPT | Performed by: INTERNAL MEDICINE

## 2022-09-10 PROCEDURE — 87040 BLOOD CULTURE FOR BACTERIA: CPT | Performed by: INTERNAL MEDICINE

## 2022-09-10 PROCEDURE — 99232 SBSQ HOSP IP/OBS MODERATE 35: CPT | Performed by: INTERNAL MEDICINE

## 2022-09-10 PROCEDURE — 25010000002 HYDRALAZINE PER 20 MG: Performed by: INTERNAL MEDICINE

## 2022-09-10 PROCEDURE — 85025 COMPLETE CBC W/AUTO DIFF WBC: CPT | Performed by: INTERNAL MEDICINE

## 2022-09-10 PROCEDURE — 25010000002 CEFTRIAXONE PER 250 MG: Performed by: INTERNAL MEDICINE

## 2022-09-10 PROCEDURE — 25010000002 ENOXAPARIN PER 10 MG: Performed by: INTERNAL MEDICINE

## 2022-09-10 RX ORDER — ATORVASTATIN CALCIUM 10 MG/1
10 TABLET, FILM COATED ORAL NIGHTLY
Status: DISCONTINUED | OUTPATIENT
Start: 2022-09-10 | End: 2022-09-13 | Stop reason: HOSPADM

## 2022-09-10 RX ORDER — METOPROLOL TARTRATE 50 MG/1
50 TABLET, FILM COATED ORAL DAILY
Status: DISCONTINUED | OUTPATIENT
Start: 2022-09-10 | End: 2022-09-13 | Stop reason: HOSPADM

## 2022-09-10 RX ORDER — DOCUSATE SODIUM 100 MG/1
200 CAPSULE, LIQUID FILLED ORAL 2 TIMES DAILY
Status: DISCONTINUED | OUTPATIENT
Start: 2022-09-10 | End: 2022-09-13 | Stop reason: HOSPADM

## 2022-09-10 RX ORDER — HYDRALAZINE HYDROCHLORIDE 25 MG/1
50 TABLET, FILM COATED ORAL 3 TIMES DAILY
Status: DISCONTINUED | OUTPATIENT
Start: 2022-09-10 | End: 2022-09-13 | Stop reason: HOSPADM

## 2022-09-10 RX ORDER — LIDOCAINE 50 MG/G
1 PATCH TOPICAL
Status: DISCONTINUED | OUTPATIENT
Start: 2022-09-10 | End: 2022-09-13 | Stop reason: HOSPADM

## 2022-09-10 RX ORDER — POLYETHYLENE GLYCOL 3350 17 G/17G
17 POWDER, FOR SOLUTION ORAL DAILY
Status: DISCONTINUED | OUTPATIENT
Start: 2022-09-10 | End: 2022-09-13 | Stop reason: HOSPADM

## 2022-09-10 RX ORDER — AMLODIPINE BESYLATE 5 MG/1
10 TABLET ORAL NIGHTLY
Status: DISCONTINUED | OUTPATIENT
Start: 2022-09-10 | End: 2022-09-13 | Stop reason: HOSPADM

## 2022-09-10 RX ORDER — DIVALPROEX SODIUM 125 MG/1
250 CAPSULE, COATED PELLETS ORAL EVERY 8 HOURS SCHEDULED
Status: DISCONTINUED | OUTPATIENT
Start: 2022-09-10 | End: 2022-09-11

## 2022-09-10 RX ORDER — PHENYTOIN SODIUM 100 MG/1
200 CAPSULE, EXTENDED RELEASE ORAL 2 TIMES DAILY
Status: DISCONTINUED | OUTPATIENT
Start: 2022-09-10 | End: 2022-09-13 | Stop reason: HOSPADM

## 2022-09-10 RX ADMIN — ENOXAPARIN SODIUM 80 MG: 100 INJECTION SUBCUTANEOUS at 01:04

## 2022-09-10 RX ADMIN — ENOXAPARIN SODIUM 80 MG: 100 INJECTION SUBCUTANEOUS at 14:45

## 2022-09-10 RX ADMIN — METOPROLOL TARTRATE 50 MG: 50 TABLET, FILM COATED ORAL at 17:29

## 2022-09-10 RX ADMIN — DIVALPROEX SODIUM 250 MG: 125 CAPSULE ORAL at 18:01

## 2022-09-10 RX ADMIN — ACETAMINOPHEN 650 MG: 325 TABLET, FILM COATED ORAL at 09:02

## 2022-09-10 RX ADMIN — PHENYTOIN SODIUM 200 MG: 100 CAPSULE ORAL at 20:20

## 2022-09-10 RX ADMIN — SERTRALINE HYDROCHLORIDE 50 MG: 50 TABLET, FILM COATED ORAL at 17:29

## 2022-09-10 RX ADMIN — ACETAMINOPHEN 650 MG: 325 TABLET, FILM COATED ORAL at 20:21

## 2022-09-10 RX ADMIN — DOCUSATE SODIUM 200 MG: 100 CAPSULE, LIQUID FILLED ORAL at 20:20

## 2022-09-10 RX ADMIN — CEFTRIAXONE 2 G: 2 INJECTION, POWDER, FOR SOLUTION INTRAMUSCULAR; INTRAVENOUS at 14:45

## 2022-09-10 RX ADMIN — Medication 10 ML: at 20:20

## 2022-09-10 RX ADMIN — HYDRALAZINE HYDROCHLORIDE 10 MG: 20 INJECTION INTRAMUSCULAR; INTRAVENOUS at 10:25

## 2022-09-10 RX ADMIN — DIVALPROEX SODIUM 250 MG: 125 CAPSULE ORAL at 23:31

## 2022-09-10 RX ADMIN — HYDRALAZINE HYDROCHLORIDE 50 MG: 25 TABLET, FILM COATED ORAL at 20:24

## 2022-09-10 RX ADMIN — ENOXAPARIN SODIUM 80 MG: 100 INJECTION SUBCUTANEOUS at 23:31

## 2022-09-10 RX ADMIN — AMLODIPINE BESYLATE 10 MG: 5 TABLET ORAL at 20:20

## 2022-09-10 RX ADMIN — ATORVASTATIN CALCIUM 10 MG: 10 TABLET, FILM COATED ORAL at 20:21

## 2022-09-10 RX ADMIN — DEXTROSE MONOHYDRATE 50 ML/HR: 50 INJECTION, SOLUTION INTRAVENOUS at 19:35

## 2022-09-10 RX ADMIN — Medication 10 ML: at 09:02

## 2022-09-10 RX ADMIN — HYDRALAZINE HYDROCHLORIDE 50 MG: 25 TABLET, FILM COATED ORAL at 17:28

## 2022-09-10 RX ADMIN — DIVALPROEX SODIUM 250 MG: 250 TABLET, DELAYED RELEASE ORAL at 09:02

## 2022-09-10 NOTE — PROGRESS NOTES
[facility] Medicine Services Daily Progress Note    Patient Name: Jeremiah Henson  : 1950  MRN: [id number]  Primary Care Physician:  Anna Barkley MD  Date of admission: 2022      Subjective      Chief Complaint: Alteration in mentation    Review of Systems   Unable to perform ROS: mental status change      9/10/2022  Today the patient was more easily arousable.  She would smile and give 1 maybe 2 word answers.    Objective      Vitals:   Temp:  [96.5 °F (35.8 °C)-97.5 °F (36.4 °C)] 96.5 °F (35.8 °C)  Heart Rate:  [] 83  Resp:  [11-18] 18  BP: (118-173)/(50-96) 126/60    Physical Exam  Vitals and nursing note reviewed.   Constitutional:       General: She is not in acute distress.     Appearance: Normal appearance. She is well-developed. She is not ill-appearing, toxic-appearing or diaphoretic.      Comments: The patient is moderately obese with a BMI of 31.   HENT:      Head: Normocephalic and atraumatic.      Right Ear: Ear canal and external ear normal.      Left Ear: Ear canal and external ear normal.      Nose: Nose normal. No congestion or rhinorrhea.      Mouth/Throat:      Mouth: Mucous membranes are moist.      Pharynx: No oropharyngeal exudate.   Eyes:      General: No scleral icterus.        Right eye: No discharge.         Left eye: No discharge.      Extraocular Movements: Extraocular movements intact.      Conjunctiva/sclera: Conjunctivae normal.      Pupils: Pupils are equal, round, and reactive to light.   Neck:      Thyroid: No thyromegaly.      Vascular: No carotid bruit or JVD.      Trachea: No tracheal deviation.   Cardiovascular:      Rate and Rhythm: Normal rate and regular rhythm.      Pulses: Normal pulses.      Heart sounds: Normal heart sounds. No murmur heard.    No friction rub. No gallop.   Pulmonary:      Effort: Pulmonary effort is normal. No respiratory distress.      Breath sounds: Normal breath sounds. No stridor. No wheezing,  rhonchi or rales.   Chest:      Chest wall: No tenderness.   Abdominal:      General: Bowel sounds are normal. There is no distension.      Palpations: Abdomen is soft. There is no mass.      Tenderness: There is no abdominal tenderness. There is no guarding or rebound.      Hernia: No hernia is present.   Musculoskeletal:         General: No swelling, tenderness, deformity or signs of injury. Normal range of motion.      Cervical back: Normal range of motion and neck supple. No rigidity. No muscular tenderness.      Right lower leg: No edema.      Left lower leg: No edema.   Lymphadenopathy:      Cervical: No cervical adenopathy.   Skin:     General: Skin is warm and dry.      Coloration: Skin is not jaundiced or pale.      Findings: No bruising, erythema or rash.   Neurological:      Mental Status: She is alert.      Cranial Nerves: No cranial nerve deficit.      Sensory: No sensory deficit.      Motor: No weakness or abnormal muscle tone.      Coordination: Coordination normal.      Comments: I do not know what the patient's baseline is neurologically.  She responds to stimulation better today.  Nursing was able to reach the daughter who reports that her mother often is not very verbose but that she does still remember her name and while she does not speak much is able to somewhat participate in conversation as her baseline.   Psychiatric:         Mood and Affect: Mood normal.         Behavior: Behavior normal.         Thought Content: Thought content normal.         Judgment: Judgment normal.           Result Review    Result Review:  I have personally reviewed the results from the time of this admission to 9/10/2022 16:48 EDT and agree with these findings:  [x]  Laboratory  [x]  Microbiology  [x]  Radiology  []  EKG/Telemetry   []  Cardiology/Vascular   []  Pathology  []  Old records  []  Other:  Most notable findings include: Elevated levels of Dilantin phenobarb and Keppra    Wounds (last 24 hours)     LDA  Wound     Row Name 09/10/22 0858 09/09/22 2020          Wound 09/08/22 1820 sacral spine Pressure Injury    Wound - Properties Group Placement Date: 09/08/22  -ER Placement Time: 1820  -ER Present on Hospital Admission: Y  -ER Location: sacral spine  -ER Primary Wound Type: Pressure inj  -ER     Pressure Injury Stage 2  -TA 2  -LM     Base non-blanchable;red  -TA --     Drainage Amount none  -TA none  -LM     Care, Wound barrier applied  -TA cleansed with;sterile normal saline;barrier applied  -LM     Dressing Care -- silicone  -LM     Periwound Care -- barrier film applied  -LM     Retired Wound - Properties Group Placement Date: 09/08/22  -ER Placement Time: 1820  -ER Present on Hospital Admission: Y  -ER Location: sacral spine  -ER Primary Wound Type: Pressure inj  -ER     Retired Wound - Properties Group Date first assessed: 09/08/22  -ER Time first assessed: 1820  -ER Present on Hospital Admission: Y  -ER Location: sacral spine  -ER Primary Wound Type: Pressure inj  -ER            Wound 09/08/22 1821 Right posterior greater trochanter    Wound - Properties Group Placement Date: 09/08/22  -ER Placement Time: 1821  -ER Side: Right  -ER Orientation: posterior  -ER Location: greater trochanter  -ER     Base blanchable  -TA --     Drainage Amount none  -TA none  -LM     Care, Wound other (see comments)  mepilex  -TA sterile normal saline;barrier applied  -LM     Dressing Care -- silicone  -LM     Retired Wound - Properties Group Placement Date: 09/08/22  -ER Placement Time: 1821  -ER Side: Right  -ER Orientation: posterior  -ER Location: greater trochanter  -ER     Retired Wound - Properties Group Date first assessed: 09/08/22  -ER Time first assessed: 1821  -ER Side: Right  -ER Location: greater trochanter  -ER           User Key  (r) = Recorded By, (t) = Taken By, (c) = Cosigned By    Initials Name Provider Type    Dominique Callahan RN Registered Nurse    Anh Castro RN Registered Nurse    DEE  Sonia Menendez, RN Registered Nurse                  Assessment & Plan      Brief Patient Summary:  Jeremiah Henson is a 71 y.o. female who presented from an extended care facility with chief complaint of alteration in mentation.  Unfortunately the patient is not able to give me much of a history.  The patient has issues with electrolytes and a past medical history of seizure disorder.  At the time of my visit the patient was really not able to answer any questions.      amLODIPine, 10 mg, Oral, Nightly  atorvastatin, 10 mg, Oral, Nightly  cefTRIAXone, 2 g, Intravenous, Q24H  divalproex, 250 mg, Oral, TID  docusate sodium, 200 mg, Oral, BID  enoxaparin, 1 mg/kg, Subcutaneous, Q12H  hydrALAZINE, 50 mg, Oral, TID  lidocaine, 1 patch, Transdermal, Q24H  metoprolol tartrate, 50 mg, Oral, Daily  phenytoin extended, 200 mg, Oral, BID  polyethylene glycol, 17 g, Oral, Daily  sertraline, 50 mg, Oral, Daily  sodium chloride, 10 mL, Intravenous, Q12H       dextrose, 50 mL/hr, Last Rate: 50 mL/hr (09/09/22 5118)         Active Hospital Problems:  Active Hospital Problems    Diagnosis    • Altered mental status, unspecified altered mental status type      Plan:   Altered mental status--likely metabolic encephalopathy related to acute kidney injury, hypernatremia; UTI; consideration for elevated Dilantin level: Monitor for improvement with electrolyte improvement and treatment for UTI     UTI: IV Rocephin; add urine culture to urinalysis specimen  -Blood cultures pending  -Lactate negative at 0.8     MEGAN, creatinine 1.48 with comparison 0.65--likely secondary to decreased oral intake: Bladder scan x1; IV fluids D5 and a half at 125; repeat BMP in a.m.  -Patient received 1500 cc normal saline in ER  -IV fluids D5 and a half for maintenance secondary to hypernatremia, mild, sodium 146  -Anion gap 18; CO2 24     Elevated phenytoin level 21.6--likely secondary to dehydration, acute kidney injury: Hold Dilantin  -Consider mild  decrease in Dilantin on discharge  -We will continue to hold the patient's Keppra but will restart the patient's Dilantin and her Depakote.  Those levels have come down.    Atrial fibrillation, chronic with elevated digoxin level: Hold digoxin; hold Eliquis; bridge Lovenox based on creatinine clearance 36 at 80 mg/kg every 12 hours; hold metoprolol secondary to borderline blood pressure; check EKG  -Digoxin level 1.40     Hypertension, chronic, uncontrolled with current hypotension: Hold Norvasc; hold lisinopril; hold metoprolol; hold hydralazine; cautious IV fluids     HLD, chronic: Hold Lipitor     Chronic pain: Hold baclofen secondary to altered mental status; hold Norco     Anxiety, chronic: Hold BuSpar secondary to altered mental status; hold Zoloft secondary to altered mental status     Seizure disorder, chronic:   -Continue Depakote and Dilantin.  Hold Keppra  -Valproic acid level normal  -Dilantin level 9  -We will add as needed Ativan for breakthrough seizure    Hypertriglyceridemia: Hold Tricor     Hypernatremia  -Changed to D5W at 75 mL an hour and check basic metabolic profile every 8 hours.  Need to lower the patient's serum sodium but gradually.  The patient is not able to eat or drink at this time so we will need to do this with IV hydration.  -Her labs this morning were hemolyzed and the lab has to come back and get another basic metabolic profile.  That has been ordered and we are awaiting those results.       -May be able to discontinue the D5W if her serum sodium is around 140 or less.    DVT prophylaxis:  Medical DVT prophylaxis orders are present.    CODE STATUS:    Level Of Support Discussed With: Patient  Code Status (Patient has no pulse and is not breathing): CPR (Attempt to Resuscitate)  Medical Interventions (Patient has pulse or is breathing): Full Support      Disposition:  I expect patient to be discharged when clinically appropriate.    This patient has been examined wearing  appropriate Personal Protective Equipment and discussed with hospital infection control department. 09/10/22      Electronically signed by Valarie Hermosillo MD, 09/10/22, 16:48 EDT.  [facility] Hospitalist Team

## 2022-09-10 NOTE — PLAN OF CARE
Goal Outcome Evaluation:  Plan of Care Reviewed With: patient        Progress: no change  Outcome Evaluation: Pt has been sleeping most of the shift. She c/o pain x1, prn medication given. Pt remains on continuous iv fluids and abt. Reached out to pt daughter as provider and I wanted to know pt baseline. According to daughter pt would sit in her chair, carry on a conversation, but has been confused on and off but never this weak and confused. Neurology consulted. Pt cannot get an MRI due to having a metal device. Will cont to monitor and document as needed.

## 2022-09-10 NOTE — NURSING NOTE
Notified  of elevated lactate from last night, Pt will have labs redrawn this morning along with a repeat lactate. Nightshift  was unable to get pt blood. Will have them try again this morning. Notified her of elevated b/p, will administer iv Hydralazine until her meds have been reconciled through pharmacy.

## 2022-09-10 NOTE — NURSING NOTE
"This nurse received patient from ER via stretcher. Pt noted confused and cannot answer question appropriately. She states \"I Hurt\" but couldn't tell where specifically.Couldnt get accurate assessment. Will continue to monitor  "

## 2022-09-10 NOTE — CONSULTS
Order noted for PICC line. Spoke with pt's primary nurse. Pt currently has 2 peripheral IVs and is not receiving any vesicants/irritants, vasopressors, TPN, or any other IV medications that requires a PICC. Pt is not an appropriate candidate for a PICC. Understand that the pt is difficult to obtain labs from; PICC team will be happy to assist if needed and/or requested.

## 2022-09-10 NOTE — CONSULTS
"Nutrition Services    Patient Name: Jeremiah Henson  YOB: 1950  MRN: [id number]  Admission date: 9/8/2022    Comment:    Boost Breeze TID (Provides 750 kcals, 42 g protein if consumed)     PPE Documentation        PPE Worn By Provider mask and eye protection   PPE Worn By Patient  none     CLINICAL NUTRITION ASSESSMENT      Reason for Assessment MST, Wound     H&P      Past Medical History:   Diagnosis Date   • CVA (cerebral vascular accident) (HCC)    • Hyperlipidemia    • Hypertension    • Seizure (HCC)    • Sleep apnea        Past Surgical History:   Procedure Laterality Date   • COLONOSCOPY N/A 11/19/2020    Procedure: COLONOSCOPY WITH POLYPECTOMY X 2;  Surgeon: Abelardo Garay MD;  Location: [facility] ENDOSCOPY;  Service: Gastroenterology;  Laterality: N/A;  COLON POLYPS   • HIP SURGERY     • PACEMAKER IMPLANTATION          Current Problems   Altered mental status--likely metabolic encephalopathy related to acute kidney injury     hypernatremia; UTI     UTI     MEGAN, creatinine 1.48 with comparison 0.65--likely secondary to decreased oral intake     Elevated phenytoin level 21.6--likely secondary to dehydration     Atrial fibrillation    Hypertension, chronic, uncontrolled with current hypotension     HLD, chronic     Chronic pain     Anxiety, chronic     Seizure disorder, chronic     Hypertriglyceridemia       Encounter Information        Trending Narrative     9/10: Visited pt in room, pt unable to provide wt hx or information about appetite d/t altered mental status. NFPE inconclusive due to edema 2-3+ in arms and legs.      Anthropometrics        Current Height, Weight Height: 165.1 cm (65\")  Weight: 72.1 kg (158 lb 15.2 oz) (09/09/22 2009)       Ideal Body Weight (IBW) 125lb   Usual Body Weight (UBW) Unable to determine       Trending Weight Hx     This admission: 9/10: wt 9/9 72.1kg most likely accurate, wt 9/8 84.8kg wt was stated             PTA: 9/10: wt loss - 14% x 9 months, " (compared to wt 1/19 of 84.8kg which is estimated)    Wt Readings from Last 30 Encounters:   09/09/22 2009 72.1 kg (158 lb 15.2 oz)   09/08/22 1032 84.8 kg (186 lb 15.2 oz)   01/19/22 2216 84.8 kg (187 lb)   11/18/20 1918 84.9 kg (187 lb 3.2 oz)   11/18/20 0419 87.6 kg (193 lb 2 oz)   11/17/20 1856 93 kg (205 lb)   07/29/20 1600 93.9 kg (207 lb)   07/24/19 1528 93.9 kg (207 lb)   07/24/18 1545 86.2 kg (190 lb)   07/24/17 1501 104 kg (230 lb)   07/21/16 1452 99.8 kg (220 lb)      BMI kg/m2 Body mass index is 26.45 kg/m².       Labs        Pertinent Labs    Results from last 7 days   Lab Units 09/09/22  0518 09/08/22  1200   SODIUM mmol/L 147* 146*   POTASSIUM mmol/L 3.9 4.5   CHLORIDE mmol/L 107 104   CO2 mmol/L 23.0 24.0   BUN mg/dL 51* 57*   CREATININE mg/dL 1.14* 1.48*   CALCIUM mg/dL 9.0 9.5   BILIRUBIN mg/dL  --  0.2   ALK PHOS U/L  --  36*   ALT (SGPT) U/L  --  8   AST (SGOT) U/L  --  14   GLUCOSE mg/dL 142* 114*     Results from last 7 days   Lab Units 09/10/22  1035   HEMOGLOBIN g/dL 11.1*   HEMATOCRIT % 33.1*     COVID19   Date Value Ref Range Status   11/18/2020 Not Detected Not Detected - Ref. Range Final     No results found for: HGBA1C     Medications    Scheduled Medications cefTRIAXone, 2 g, Intravenous, Q24H  divalproex, 250 mg, Oral, TID  enoxaparin, 1 mg/kg, Subcutaneous, Q12H  sodium chloride, 10 mL, Intravenous, Q12H        Infusions dextrose, 50 mL/hr, Last Rate: 50 mL/hr (09/09/22 2233)        PRN Medications •  acetaminophen **OR** acetaminophen **OR** acetaminophen  •  bisacodyl  •  hydrALAZINE  •  ondansetron **OR** ondansetron  •  sodium chloride  •  sodium chloride     Physical Findings        Trending Physical   Appearance, NFPE  NFPE inconclusive due to edema 2-3+ in arms and legs.    --  Edema  2-3+ in arms and legs     Bowel Function BM 9/10   Tubes None noted   Chewing/Swallowing No issues noted   Skin Stage 2 PI sacral spine, and greater trochanter wound   --  Current Nutrition Orders  & Evaluation of Intake       Oral Nutrition     Food Allergies NKFA   Current PO Diet Diet Liquids; Clear Liquid   Supplement none   PO Evaluation     Trending % PO Intake ~20% intake    --  Nutritional Risk Screening        NRS-2002 Score          Nutrition Diagnosis         Nutrition Dx Problem 1 Inadequate oral intake related to altered mental status as evidenced by ~20% PO intake during hospital stay      Nutrition Dx Problem 2        Intervention Goal         Intervention Goal(s) PO intake > 50%  Acceptance of ONS     Nutrition Intervention        RD Action Will add Boost Breeze TID     Nutrition Prescription          Diet Prescription Clear liquids   Supplement Prescription Boost Breeze TID (Provides 750 kcals, 42 g protein if consumed)      --  Monitor/Evaluation        Monitor PO intake, Supplement intake, Weight         Electronically signed by:  Alix Painting  09/10/22 14:57 EDT

## 2022-09-11 LAB
ANION GAP SERPL CALCULATED.3IONS-SCNC: 9 MMOL/L (ref 5–15)
BACTERIA SPEC AEROBE CULT: ABNORMAL
BUN SERPL-MCNC: 31 MG/DL (ref 8–23)
BUN/CREAT SERPL: 47 (ref 7–25)
CALCIUM SPEC-SCNC: 8.5 MG/DL (ref 8.6–10.5)
CHLORIDE SERPL-SCNC: 103 MMOL/L (ref 98–107)
CHOLEST SERPL-MCNC: 174 MG/DL (ref 0–200)
CO2 SERPL-SCNC: 28 MMOL/L (ref 22–29)
CREAT SERPL-MCNC: 0.66 MG/DL (ref 0.57–1)
EGFRCR SERPLBLD CKD-EPI 2021: 92.8 ML/MIN/1.73
GLUCOSE SERPL-MCNC: 129 MG/DL (ref 65–99)
HDLC SERPL-MCNC: 53 MG/DL (ref 40–60)
LDLC SERPL CALC-MCNC: 92 MG/DL (ref 0–100)
LDLC/HDLC SERPL: 1.64 {RATIO}
PHENYTOIN SERPL-MCNC: 13 MCG/ML (ref 10–20)
POTASSIUM SERPL-SCNC: 3.7 MMOL/L (ref 3.5–5.2)
SODIUM SERPL-SCNC: 140 MMOL/L (ref 136–145)
TRIGL SERPL-MCNC: 171 MG/DL (ref 0–150)
TSH SERPL DL<=0.05 MIU/L-ACNC: 1.11 UIU/ML (ref 0.27–4.2)
VALPROATE SERPL-MCNC: 20.7 MCG/ML (ref 50–125)
VIT B12 BLD-MCNC: 948 PG/ML (ref 211–946)
VLDLC SERPL-MCNC: 29 MG/DL (ref 5–40)

## 2022-09-11 PROCEDURE — 97166 OT EVAL MOD COMPLEX 45 MIN: CPT

## 2022-09-11 PROCEDURE — 80061 LIPID PANEL: CPT | Performed by: NURSE PRACTITIONER

## 2022-09-11 PROCEDURE — 80048 BASIC METABOLIC PNL TOTAL CA: CPT | Performed by: INTERNAL MEDICINE

## 2022-09-11 PROCEDURE — 99222 1ST HOSP IP/OBS MODERATE 55: CPT | Performed by: NURSE PRACTITIONER

## 2022-09-11 PROCEDURE — 84443 ASSAY THYROID STIM HORMONE: CPT | Performed by: NURSE PRACTITIONER

## 2022-09-11 PROCEDURE — 25010000002 ENOXAPARIN PER 10 MG: Performed by: INTERNAL MEDICINE

## 2022-09-11 PROCEDURE — 25010000002 CEFTRIAXONE PER 250 MG: Performed by: INTERNAL MEDICINE

## 2022-09-11 PROCEDURE — 82607 VITAMIN B-12: CPT | Performed by: NURSE PRACTITIONER

## 2022-09-11 PROCEDURE — 99232 SBSQ HOSP IP/OBS MODERATE 35: CPT | Performed by: INTERNAL MEDICINE

## 2022-09-11 PROCEDURE — 80164 ASSAY DIPROPYLACETIC ACD TOT: CPT | Performed by: NURSE PRACTITIONER

## 2022-09-11 PROCEDURE — 80185 ASSAY OF PHENYTOIN TOTAL: CPT | Performed by: NURSE PRACTITIONER

## 2022-09-11 PROCEDURE — 97162 PT EVAL MOD COMPLEX 30 MIN: CPT

## 2022-09-11 RX ORDER — LEVETIRACETAM 5 MG/ML
500 INJECTION INTRAVASCULAR EVERY 12 HOURS SCHEDULED
Status: DISCONTINUED | OUTPATIENT
Start: 2022-09-11 | End: 2022-09-11

## 2022-09-11 RX ORDER — LEVETIRACETAM 500 MG/1
500 TABLET ORAL 2 TIMES DAILY
Status: DISCONTINUED | OUTPATIENT
Start: 2022-09-11 | End: 2022-09-11

## 2022-09-11 RX ORDER — ENOXAPARIN SODIUM 100 MG/ML
70 INJECTION SUBCUTANEOUS EVERY 12 HOURS
Status: DISCONTINUED | OUTPATIENT
Start: 2022-09-12 | End: 2022-09-12

## 2022-09-11 RX ORDER — DIVALPROEX SODIUM 125 MG/1
500 CAPSULE, COATED PELLETS ORAL EVERY 12 HOURS SCHEDULED
Status: DISCONTINUED | OUTPATIENT
Start: 2022-09-11 | End: 2022-09-13 | Stop reason: HOSPADM

## 2022-09-11 RX ADMIN — SERTRALINE HYDROCHLORIDE 50 MG: 50 TABLET, FILM COATED ORAL at 08:40

## 2022-09-11 RX ADMIN — POLYETHYLENE GLYCOL 3350 17 G: 17 POWDER, FOR SOLUTION ORAL at 08:40

## 2022-09-11 RX ADMIN — ENOXAPARIN SODIUM 80 MG: 100 INJECTION SUBCUTANEOUS at 13:53

## 2022-09-11 RX ADMIN — DOCUSATE SODIUM 200 MG: 100 CAPSULE, LIQUID FILLED ORAL at 08:35

## 2022-09-11 RX ADMIN — ATORVASTATIN CALCIUM 10 MG: 10 TABLET, FILM COATED ORAL at 21:10

## 2022-09-11 RX ADMIN — METOPROLOL TARTRATE 50 MG: 50 TABLET, FILM COATED ORAL at 08:35

## 2022-09-11 RX ADMIN — AMLODIPINE BESYLATE 10 MG: 5 TABLET ORAL at 21:11

## 2022-09-11 RX ADMIN — LEVETIRACETAM 750 MG: 500 TABLET, FILM COATED ORAL at 21:10

## 2022-09-11 RX ADMIN — HYDRALAZINE HYDROCHLORIDE 50 MG: 25 TABLET, FILM COATED ORAL at 21:10

## 2022-09-11 RX ADMIN — HYDRALAZINE HYDROCHLORIDE 50 MG: 25 TABLET, FILM COATED ORAL at 18:25

## 2022-09-11 RX ADMIN — Medication 10 ML: at 08:36

## 2022-09-11 RX ADMIN — DIVALPROEX SODIUM 250 MG: 125 CAPSULE ORAL at 08:35

## 2022-09-11 RX ADMIN — PHENYTOIN SODIUM 200 MG: 100 CAPSULE ORAL at 21:11

## 2022-09-11 RX ADMIN — CEFTRIAXONE 2 G: 2 INJECTION, POWDER, FOR SOLUTION INTRAMUSCULAR; INTRAVENOUS at 13:56

## 2022-09-11 RX ADMIN — DIVALPROEX SODIUM 500 MG: 125 CAPSULE, COATED PELLETS ORAL at 21:10

## 2022-09-11 RX ADMIN — Medication 10 ML: at 21:10

## 2022-09-11 RX ADMIN — HYDRALAZINE HYDROCHLORIDE 50 MG: 25 TABLET, FILM COATED ORAL at 08:35

## 2022-09-11 RX ADMIN — PHENYTOIN SODIUM 200 MG: 100 CAPSULE ORAL at 08:35

## 2022-09-11 RX ADMIN — LEVETIRACETAM 500 MG: 500 TABLET, FILM COATED ORAL at 13:56

## 2022-09-11 NOTE — THERAPY EVALUATION
Patient Name: Jeremiah Henson  : 1950    MRN: [id number]                              Today's Date: 2022       Admit Date: 2022    Visit Dx:     ICD-10-CM ICD-9-CM   1. Altered mental status, unspecified altered mental status type  R41.82 780.97   2. Acute cystitis with hematuria  N30.01 595.0   3. Elevated digoxin level  R78.89 796.0   4. On valproic acid therapy  Z79.899 V58.69   5. Elevated phenytoin level  R78.89 790.6   6. MEGAN (acute kidney injury) (HCC)  N17.9 584.9     Patient Active Problem List   Diagnosis   • Atrial fibrillation (HCC)   • Cerebral infarction (HCC)   • Family history of lung cancer   • Hemiplegia of dominant side (HCC)   • Hyperlipidemia   • Hypertensive disorder   • Seizure disorder (HCC)   • Sleep apnea   • Abscess of back   • Chronic pain disorder   • Closed fracture of right hand   • Seasonal allergic rhinitis   • Urinary tract infectious disease   • Vitamin D deficiency   • Seizure disorder (HCC)   • Abdominal pain   • Anxiety   • Constipation   • Depressive disorder   • Mood disorder (HCC)   • Dislocation of shoulder joint   • Mechanical ileus (HCC)   • Left lower quadrant abdominal pain   • Altered mental status, unspecified altered mental status type     Past Medical History:   Diagnosis Date   • CVA (cerebral vascular accident) (HCC)    • Hyperlipidemia    • Hypertension    • Seizure (HCC)    • Sleep apnea      Past Surgical History:   Procedure Laterality Date   • COLONOSCOPY N/A 2020    Procedure: COLONOSCOPY WITH POLYPECTOMY X 2;  Surgeon: Abelardo Garay MD;  Location: [facility];  Service: Gastroenterology;  Laterality: N/A;  COLON POLYPS   • HIP SURGERY     • PACEMAKER IMPLANTATION        General Information     Row Name 22 1124          OT Time and Intention    Document Type evaluation  -SB     Mode of Treatment occupational therapy  -SB     Row Name 22 1124          General Information    Patient Profile Reviewed yes  -SB      Prior Level of Function max assist:;dependent:;ADL's  -SB     Existing Precautions/Restrictions fall  -SB     Barriers to Rehab medically complex;previous functional deficit  -SB     Row Name 09/11/22 1124          Occupational Profile    Reason for Services/Referral (Occupational Profile) Pt is a 70 y/o female presenting to ER on 9/8 with c/o AMS as noted by [facility] staff. Pt found to have UTI. PMHx: CVA, HTN, seizures. Pt is a resident at [facility].  -SB     Row Name 09/11/22 1124          Living Environment    People in Home facility resident  -SB     Row Name 09/11/22 1124          Cognition    Orientation Status (Cognition) oriented to;person  -SB     Row Name 09/11/22 1124          Safety Issues, Functional Mobility    Safety Issues Affecting Function (Mobility) ability to follow commands;insight into deficits/self-awareness;problem-solving  -SB     Impairments Affecting Function (Mobility) cognition;coordination;endurance/activity tolerance;grasp;motor control;muscle tone abnormal;pain;strength;range of motion (ROM);postural/trunk control  -SB     Cognitive Impairments, Mobility Safety/Performance awareness, need for assistance;problem-solving/reasoning;insight into deficits/self-awareness  -SB           User Key  (r) = Recorded By, (t) = Taken By, (c) = Cosigned By    Initials Name Provider Type    Eve Sparks OT Occupational Therapist                 Mobility/ADL's     Row Name 09/11/22 1125          Bed Mobility    Bed Mobility bed mobility (all) activities  -SB     All Activities, [place] (Bed Mobility) dependent (less than 25% patient effort);2 person assist  -SB     Bed Mobility, Safety Issues cognitive deficits limit understanding;decreased use of arms for pushing/pulling;decreased use of legs for bridging/pushing  -SB     Assistive Device (Bed Mobility) bed rails;draw sheet;head of bed elevated  -SB     Row Name 09/11/22 1125          Activities of Daily Living    BADL Assessment/Intervention  grooming  -SB     Row Name 09/11/22 1125          Grooming Assessment/Training    [place] Level (Grooming) hair care, combing/brushing;dependent (less than 25% patient effort)  -SB     Position (Grooming) edge of bed sitting  -SB           User Key  (r) = Recorded By, (t) = Taken By, (c) = Cosigned By    Initials Name Provider Type    SB Eve Gallegos OT Occupational Therapist               Obj/Interventions     Row Name 09/11/22 1126          Sensory Assessment (Somatosensory)    Sensory Assessment (Somatosensory) unable/difficult to assess  -SB     Row Name 09/11/22 1126          Vision Assessment/Intervention    Visual Impairment/Limitations unable/difficult to assess  -SB     Row Name 09/11/22 1126          Range of Motion Comprehensive    General Range of Motion upper extremity range of motion deficits identified  -SB     Comment, General Range of Motion RUE demo'ing fixed shoulder ER, elbox extension with forearm supination, wrist extension, finger flexion.  -SB     Row Name 09/11/22 1126          Strength Comprehensive (MMT)    General Manual Muscle Testing (MMT) Assessment upper extremity strength deficits identified  -SB     Comment, General Manual Muscle Testing (MMT) Assessment Pt grossly weak in BUE. Unable to complete MMT due to pt not following directions and RUE in fixed position.  -SB     Row Name 09/11/22 1126          Motor Skills    Motor Skills muscle tone  -SB     Muscle Tone right;upper extremity(s);severe impairment;hypertonia;decerebrate rigidity  -SB     Row Name 09/11/22 1126          Balance    Balance Assessment sitting static balance  -SB     Static Sitting Balance maximum assist;2-person assist  -SB     Position, Sitting Balance supported;sitting edge of bed  -SB     Balance Interventions sitting;supported;static;minimal challenge  -SB           User Key  (r) = Recorded By, (t) = Taken By, (c) = Cosigned By    Initials Name Provider Type    SB Eve Gallegos OT Occupational  Therapist               Goals/Plan    No documentation.                Clinical Impression     Row Name 09/11/22 1130          Pain Assessment    Additional Documentation Pain Scale: FACES Pre/Post-Treatment (Group)  -SB     Row Name 09/11/22 1130          Pain Scale: FACES Pre/Post-Treatment    Pain: FACES Scale, Pretreatment 0-->no hurt  -SB     Posttreatment Pain Rating 6-->hurts even more  -SB     Pain Location - other (see comments)  Pt expressing pain during ROM assessment  -SB     Row Name 09/11/22 1130          Plan of Care Review    Plan of Care Reviewed With patient  -SB     Progress no change  -SB     Outcome Evaluation Pt is a 72 y/o female presenting to ER on 9/8 with c/o AMS as noted by [facility] staff. Pt found to have UTI. PMHx: CVA, HTN, seizures. Pt supine in bed at start of session. Pt demo'ing severe abnormal tone in RUE as noted above. Pt dependent x2 for sup>sit EOB. Pt required Max-Ax2 for maintaining static sitting balance at EOB. Pt dependent for grooming in supported sit at EOB this session. Pt demo'ing functional indpendence of her baseline and would not benefit from continued skilled services.  -SB     Row Name 09/11/22 1130          Therapy Assessment/Plan (OT)    Criteria for Skilled Therapeutic Interventions Met (OT) no;does not meet criteria for skilled intervention  -SB     Therapy Frequency (OT) evaluation only  -SB     Row Name 09/11/22 1130          Therapy Plan Review/Discharge Plan (OT)    Anticipated Discharge Disposition (OT) extended care facility  -SB     Row Name 09/11/22 1130          Positioning and Restraints    Pre-Treatment Position in bed  -SB     Post Treatment Position bed  -SB     In Bed notified nsg;supine;call light within reach;encouraged to call for assist;exit alarm on  -SB           User Key  (r) = Recorded By, (t) = Taken By, (c) = Cosigned By    Initials Name Provider Type    Eve Sparks, OT Occupational Therapist               Outcome Measures     Row Name  09/11/22 1132          How much help from another is currently needed...    Putting on and taking off regular lower body clothing? 1  -SB     Bathing (including washing, rinsing, and drying) 1  -SB     Toileting (which includes using toilet bed pan or urinal) 1  -SB     Putting on and taking off regular upper body clothing 1  -SB     Taking care of personal grooming (such as brushing teeth) 1  -SB     Eating meals 2  -SB     AM-PAC 6 Clicks Score (OT) 7  -SB     Row Name 09/11/22 1132          Functional Assessment    Outcome Measure Options AM-PAC 6 Clicks Daily Activity (OT)  -SB           User Key  (r) = Recorded By, (t) = Taken By, (c) = Cosigned By    Initials Name Provider Type    Eve Sparks OT Occupational Therapist                Occupational Therapy Education                 Title: PT OT SLP Therapies (Done)     Topic: Occupational Therapy (Done)     Point: ADL training (Done)     Description:   Instruct learner(s) on proper safety adaptation and remediation techniques during self care or transfers.   Instruct in proper use of assistive devices.              Learning Progress Summary           Patient Acceptance, E, VU by SB at 9/11/2022 1133                   Point: Home exercise program (Done)     Description:   Instruct learner(s) on appropriate technique for monitoring, assisting and/or progressing therapeutic exercises/activities.              Learning Progress Summary           Patient Acceptance, E, VU by SB at 9/11/2022 1133                   Point: Precautions (Done)     Description:   Instruct learner(s) on prescribed precautions during self-care and functional transfers.              Learning Progress Summary           Patient Acceptance, E, VU by SB at 9/11/2022 1133                   Point: Body mechanics (Done)     Description:   Instruct learner(s) on proper positioning and spine alignment during self-care, functional mobility activities and/or exercises.              Learning Progress  Summary           Patient Acceptance, E, VU by  at 9/11/2022 1133                               User Key     Initials Effective Dates Name Provider Type Discipline     06/07/22 -  Eve Gallegos OT Occupational Therapist OT              OT Recommendation and Plan  Therapy Frequency (OT): evaluation only  Plan of Care Review  Plan of Care Reviewed With: patient  Progress: no change  Outcome Evaluation: Pt is a 72 y/o female presenting to ER on 9/8 with c/o AMS as noted by ECF staff. Pt found to have UTI. PMHx: CVA, HTN, seizures. Pt supine in bed at start of session. Pt demo'ing severe abnormal tone in RUE as noted above. Pt dependent x2 for sup>sit EOB. Pt required Max-Ax2 for maintaining static sitting balance at EOB. Pt dependent for grooming in supported sit at EOB this session. Pt demo'ing functional indpendence of her baseline and would not benefit from continued skilled services.     Time Calculation:    Time Calculation- OT     Row Name 09/11/22 1133             Time Calculation- OT    OT Start Time 0907  -      OT Stop Time 0922  -      OT Time Calculation (min) 15 min  -      OT Received On 09/11/22  -            User Key  (r) = Recorded By, (t) = Taken By, (c) = Cosigned By    Initials Name Provider Type     Eve Gallegos OT Occupational Therapist              Therapy Charges for Today     Code Description Service Date Service Provider Modifiers Qty    [id number]  OT EVAL MOD COMPLEXITY 3 9/11/2022 Eve Gallegos OT GO 1               Eve Gallegos OT  9/11/2022

## 2022-09-11 NOTE — CONSULTS
[facility] Consult Note      Date of Hospital Admission:  9/8/2022  Today's Date:  9/11/2022                             Requesting Physician:  Valarie Hermosillo MD  Consulting Physician: Dr. Thony Garcia MD FACS    Reason for Consultation:  <principal problem not specified>    Patient Identification:   Jeremiah Henson:   71 y.o.  female  1950  [id number]          Date:  9/11/2022     Chief Complaint   Patient presents with   • Altered Mental Status       History of Present Illness:     Ms. Henson is a pleasant 71 y.o. year old female wh[facility]ed [facility]/2022 from the [facility] complaining of altered mental status as noted by [facility] staff.  At time of interview the patient moans and calls for her mother.  The patient is noted to have dry mucosa.  She moves her extremities with weakness continuously but not to command.     She currently has a tolentino in place and mental status is improving      Past Medical History:     Past Medical History:   Diagnosis Date   • CVA (cerebral vascular accident) (HCC)    • Hyperlipidemia    • Hypertension    • Seizure (HCC)    • Sleep apnea      Past Surgical History:     Past Surgical History:   Procedure Laterality Date   • COLONOSCOPY N/A 11/19/2020    Procedure: COLONOSCOPY WITH POLYPECTOMY X 2;  Surgeon: Abelardo Garay MD;  Location: [facility];  Service: Gastroenterology;  Laterality: N/A;  COLON POLYPS   • HIP SURGERY     • PACEMAKER IMPLANTATION       Social History:     Social History     Socioeconomic History   • Marital status:    Tobacco Use   • Smoking status: Former Smoker     Years: 20.00   • Smokeless tobacco: Never Used   Substance and Sexual Activity   • Alcohol use: No   • Drug use: No   • Sexual activity: Defer     Family History:     Family History   Problem Relation Age of Onset   • Lung cancer Father    • Heart attack Father      Allergies:   No Known Allergies  Medications:     No current facility-administered  medications on file prior to encounter.     Current Outpatient Medications on File Prior to Encounter   Medication Sig Dispense Refill   • acetaminophen (TYLENOL) 325 MG tablet Take 650 mg by mouth Every 8 (Eight) Hours As Needed for Mild Pain , Moderate Pain  or Fever.     • acetaminophen (Tylenol) 325 MG tablet Take 650 mg by mouth 2 (Two) Times a Day.     • amLODIPine (NORVASC) 10 MG tablet Take 10 mg by mouth Every Night.     • atorvastatin (LIPITOR) 10 MG tablet Take 10 mg by mouth Every Night.     • baclofen (LIORESAL) 10 MG tablet Take 15 mg by mouth 3 (Three) Times a Day.     • bisacodyl (DULCOLAX) 10 MG suppository Insert 10 mg into the rectum Daily As Needed.     • busPIRone (BUSPAR) 5 MG tablet Take 5 mg by mouth 2 (Two) Times a Day.     • Cholecalciferol (Vitamin D3) 50 MCG (2000 UT) tablet Take  by mouth Daily.     • Diclofenac Sodium (VOLTAREN) 1 % gel gel 2 (Two) Times a Day. Right Knee/ Right Shoulder     • digoxin (LANOXIN) 250 MCG tablet Take 250 mcg by mouth Daily. Hold For Pulse <60     • divalproex (DEPAKOTE) 125 MG DR tablet Take 250 mg by mouth 3 (Three) Times a Day.     • docusate sodium (COLACE) 100 MG capsule Take 200 mg by mouth 2 (Two) Times a Day.     • Eliquis 5 MG tablet tablet Take 5 mg by mouth 2 (Two) Times a Day.     • fenofibrate (TRICOR) 48 MG tablet Take 48 mg by mouth Daily.     • hydrALAZINE (APRESOLINE) 50 MG tablet Take 50 mg by mouth 3 (Three) Times a Day.     • HYDROcodone-acetaminophen (NORCO) 5-325 MG per tablet Take 1 tablet by mouth 2 (Two) Times a Day.     • levETIRAcetam (KEPPRA) 500 MG tablet Take 500 mg by mouth 2 (Two) Times a Day.     • Lidocaine 4 % patch Apply  topically Daily. Right Shoulder     • lisinopril (PRINIVIL,ZESTRIL) 40 MG tablet Take 40 mg by mouth Daily.     • metoprolol tartrate (LOPRESSOR) 100 MG tablet Take 100 mg by mouth Daily.     • Omega-3 1000 MG capsule Take 1 capsule by mouth 2 (two) times a day.     • phenytoin extended (DILANTIN) 200 MG  "ER capsule Take 200 mg by mouth 2 (Two) Times a Day.     • polyethylene glycol (MIRALAX) 17 g packet Take 17 g by mouth Daily.     • senna (senna) 8.6 MG tablet Take 2 tablets by mouth 2 (Two) Times a Day.     • sertraline (ZOLOFT) 50 MG tablet Take 50 mg by mouth Daily.       Current Meds:   amLODIPine, 10 mg, Oral, Nightly  atorvastatin, 10 mg, Oral, Nightly  cefTRIAXone, 2 g, Intravenous, Q24H  Divalproex Sodium, 250 mg, Oral, Q8H  docusate sodium, 200 mg, Oral, BID  enoxaparin, 1 mg/kg, Subcutaneous, Q12H  hydrALAZINE, 50 mg, Oral, TID  lidocaine, 1 patch, Transdermal, Q24H  metoprolol tartrate, 50 mg, Oral, Daily  phenytoin extended, 200 mg, Oral, BID  polyethylene glycol, 17 g, Oral, Daily  sertraline, 50 mg, Oral, Daily  sodium chloride, 10 mL, Intravenous, Q12H      dextrose, 50 mL/hr, Last Rate: 50 mL/hr (09/10/22 1935)      Review of Systems:     Constitutional:  No recent weight gain/loss, fever, chills  Opthalmalogic: No change in vision, amaurosis fugax, blurred vision  Otolaryngeal:  No epistaxis  Cardiovascular: No recent chest pain  Pulmonary:  No cough, sputum production, hemoptysis  Gastrointestinal: No melena, hematochezia, BRBPR, hematemesis  Genitourinary:  See HPI  Hematologic:  No tendency for easy bruising  Musculoskeletal: No muscle pain  Neurologic:  No Seizures, new focal deficits     Physical Examination:     Vital signs: /82 (BP Location: Left arm, Patient Position: Lying)   Pulse 81   Temp 98.1 °F (36.7 °C) (Axillary)   Resp 16   Ht 165.1 cm (65\")   Wt 72.7 kg (160 lb 4.4 oz)   SpO2 98%   BMI 26.67 kg/m²   98%     General: Well developed, well nourished, alert and oriented x 3    Appears stated age. No apparent distress.    HEENT: Moist mucous membranes of the oral mucosa & nasal mucosa.    Lips are not cyanotic.    PERRL. No sclera icterus. Extraocular movements intact    Neck:  Trachea position is mid line. No lymphadenopathy. Supple.    No thyromegaly, tenderness, or " mass.    Respiratory: Respiratory rhythm & depth: Normal.    Respiratory effort:  Normal.    no dullness to percussion across bases.    Cardiac: RRR, normal S1 and S2_    GI:  Normal bowel sounds. No hepatosplenomegaly.     No masses. No bruits.  Nondistended. nontender.    Skin:  Inspection: No rash.    Palpation:  Warm, dry. No induration.     Extremities: No clubbing & no cyanosis.    No edema    :  Normal external genitalia    tolentino       Lab Trends/Serial Data     Lab Results   Component Value Date    CHOL 171 10/04/2017    CHOL 161 07/28/2017     Lab Results   Component Value Date    HDL 52 10/04/2017    HDL 43 07/28/2017     No components found for: LDLCALC  No components found for: TRIGLYCDES  Lab Results   Component Value Date    CREATININE 0.66 09/11/2022    CREATININE 1.14 (H) 09/09/2022    CREATININE 1.48 (H) 09/08/2022    CREATININE 0.65 11/19/2020     Lab Results   Component Value Date     (H) 10/05/2017     No results found for: TROPONIN  Lab Results   Component Value Date    HGB 11.0 (L) 09/10/2022    HGB 11.1 (L) 09/10/2022    HGB 11.5 (L) 09/09/2022    HGB 11.8 (L) 09/08/2022     Imaging Studies:     Imaging Results (All)     Procedure Component Value Units Date/Time    CT Head Without Contrast [[id number]] Collected: 09/08/22 1402     Updated: 09/08/22 1408    Narrative:      CT HEAD WO CONTRAST-     Date of Exam: 9/8/2022 1:56 PM     Indication: reported confusion in demented patient-h/o CVA on Eliquis.     Comparison: CT 08/26/2020     Technique:  Without contrast, contiguous axial CT images of the head  were obtained from skull base to vertex.  Coronal and sagittal  reconstructions were performed.  Automated exposure control and  iterative reconstruction methods were used.     FINDINGS  No evidence of intracranial hemorrhage, mass, or midline shift. Stable  encephalomalacia within the left MCA territory as well as within the  right occipital lobe, unchanged as compared to the previous  study. No  new areas of hypodensity identified. The ventricles appear normal in  size for the patient's age. No extra-axial collections identified. The  gray white matter differentiation is intact. No air-fluid levels  identified within the paranasal sinuses. The extra-axial structures  demonstrate no acute process. Calcified lesion within the right  cerebellar scalp likely represents a calcified sebaceous cyst.       Impression:      No evidence of hemorrhage, mass effect or midline shift. No acute  process identified. Stable chronic findings as above.     Electronically Signed By-Huong Dwyer MD On:9/8/2022 2:06 PM  This report was finalized on [id number] by  Huong Dwyer MD.    XR Chest 1 View [[id number]] Collected: 09/08/22 1254     Updated: 09/08/22 1258    Narrative:      DATE OF EXAM:  9/8/2022 12:00 PM     PROCEDURE:  XR CHEST 1 VW-     INDICATIONS:  AMS Protocol       COMPARISON:  AP portable chest 10/5/2017.     TECHNIQUE:   Single radiographic view of the chest was obtained.     FINDINGS:  The lungs are free of acute airspace disease. Benign calcified granuloma  is seen in the right costophrenic angle. No pleural effusion or  pneumothorax is seen. Stable borderline cardiac enlargement. Pacemaker  lead extends to the level of the right ventricle. Moderate coronary  artery calcifications present. There are multiple old right rib  fractures. Osteopenic changes are present. There is anterior inferior  subluxation or dislocation of the right shoulder, similar in appearance  to the 2017 comparison.       Impression:         1. No acute cardiopulmonary findings.   2. Anterior inferior subluxation or dislocation of the right shoulder.  This has a similar appearance to the chest x-ray from 10/5/2017.  Correlate with physical exam findings.  3. Stable borderline cardiac enlargement.  4. Old right rib fractures.     Electronically Signed By-Taty Mack MD On:9/8/2022 12:55 PM  This report was finalized on  [id number] by  Taty Mack MD.               Assessment:     Patient Active Problem List   Diagnosis   • Atrial fibrillation (HCC)   • Cerebral infarction (HCC)   • Family history of lung cancer   • Hemiplegia of dominant side (HCC)   • Hyperlipidemia   • Hypertensive disorder   • Seizure disorder (HCC)   • Sleep apnea   • Abscess of back   • Chronic pain disorder   • Closed fracture of right hand   • Seasonal allergic rhinitis   • Urinary tract infectious disease   • Vitamin D deficiency   • Seizure disorder (HCC)   • Abdominal pain   • Anxiety   • Constipation   • Depressive disorder   • Mood disorder (HCC)   • Dislocation of shoulder joint   • Mechanical ileus (HCC)   • Left lower quadrant abdominal pain   • Altered mental status, unspecified altered mental status type       1. Retention  2. uti    Recommendations:     1. Cont antibiotic  2. Irrigate cath prn  3. Cont tolentino      I sincerely appreciate the opportunity to participate in your patient's care. Please feel free to contact me anytime if I can be of assistance in this or any other way.      Thony Garcia FACS MD  [facility]  9/11/2022  07:16 EDT

## 2022-09-11 NOTE — THERAPY EVALUATION
Patient Name: Jeremiah Henson  : 1950    MRN: [id number]                              Today's Date: 2022       Admit Date: 2022    Visit Dx:     ICD-10-CM ICD-9-CM   1. Altered mental status, unspecified altered mental status type  R41.82 780.97   2. Acute cystitis with hematuria  N30.01 595.0   3. Elevated digoxin level  R78.89 796.0   4. On valproic acid therapy  Z79.899 V58.69   5. Elevated phenytoin level  R78.89 790.6   6. MEGAN (acute kidney injury) (HCC)  N17.9 584.9     Patient Active Problem List   Diagnosis   • Atrial fibrillation (HCC)   • Cerebral infarction (HCC)   • Family history of lung cancer   • Hemiplegia of dominant side (HCC)   • Hyperlipidemia   • Hypertensive disorder   • Seizure disorder (HCC)   • Sleep apnea   • Abscess of back   • Chronic pain disorder   • Closed fracture of right hand   • Seasonal allergic rhinitis   • Urinary tract infectious disease   • Vitamin D deficiency   • Seizure disorder (HCC)   • Abdominal pain   • Anxiety   • Constipation   • Depressive disorder   • Mood disorder (HCC)   • Dislocation of shoulder joint   • Mechanical ileus (HCC)   • Left lower quadrant abdominal pain   • Altered mental status, unspecified altered mental status type     Past Medical History:   Diagnosis Date   • CVA (cerebral vascular accident) (HCC)    • Hyperlipidemia    • Hypertension    • Seizure (HCC)    • Sleep apnea      Past Surgical History:   Procedure Laterality Date   • COLONOSCOPY N/A 2020    Procedure: COLONOSCOPY WITH POLYPECTOMY X 2;  Surgeon: Abelardo Garay MD;  Location[facility];  Service: Gastroenterology;  Laterality: N/A;  COLON POLYPS   • HIP SURGERY     • PACEMAKER IMPLANTATION        General Information     Row Name 22 1140          Physical Therapy Time and Intention    Document Type evaluation  -HC     Mode of Treatment physical therapy  -HC     Row Name 22 1140          General Information    Patient Profile Reviewed  yes  -     Prior Level of Function --  unable to determine due to impaired cognition; however appears to require maxA-depA due to current mobility and deficits  -     Existing Precautions/Restrictions fall  -     Barriers to Rehab medically complex;previous functional deficit;cognitive status  -     Row Name 22 1140          Living Environment    People in Home facility resident  resides at [facility]  -     Row Name 22 1140          Cognition    Orientation Status (Cognition) --  Oriented to name only; not oriented to , time, place, or situation  -     Row Name 22 1140          Safety Issues, Functional Mobility    Impairments Affecting Function (Mobility) balance;cognition;endurance/activity tolerance;range of motion (ROM);strength;pain;postural/trunk control  -           User Key  (r) = Recorded By, (t) = Taken By, (c) = Cosigned By    Initials Name Provider Type     Becky Zepeda, PT Physical Therapist               Mobility     Row Name 22 1141          Bed Mobility    Bed Mobility supine-sit;sit-supine;scooting/bridging  -     Scooting/Bridging Gr[place] (Bed Mobility) 2 person assist;dependent (less than 25% patient effort)  -     Supine-Sit [place] (Bed Mobility) 2 person assist;dependent (less than 25% patient effort)  -     Sit-Supine [place] (Bed Mobility) 2 person assist;dependent (less than 25% patient effort)  -     Row Name 22 1141          Bed-Chair Transfer    Bed-Chair [place] (Transfers) unable to assess  -     Row Name 22 1141          Sit-Stand Transfer    Sit-Stand [place] (Transfers) unable to assess  -     Row Name 22 1141          Gait/Stairs (Locomotion)    Grimes Level (Gait) unable to assess  -           User Key  (r) = Recorded By, (t) = Taken By, (c) = Cosigned By    Initials Name Provider Type    Becky Larson, PT Physical Therapist               Obj/Interventions      Row Name 09/11/22 1141          Range of Motion Comprehensive    Comment, General Range of Motion right hip and knee PROM WFL, right ankle lacking 25% DF ROM from neutral; left knee and hip PROM WFL, left ankle DF lacking 50% DF ROM from neutral  -     Row Name 09/11/22 1141          Strength Comprehensive (MMT)    Comment, General Manual Muscle Testing (MMT) Assessment unable to formally assess due to impaired cognition  -     Row Name 09/11/22 1141          Balance    Balance Assessment sitting static balance;sitting dynamic balance  -     Static Sitting Balance maximum assist  -     Dynamic Sitting Balance 2-person assist;maximum assist  -     Row Name 09/11/22 1141          Sensory Assessment (Somatosensory)    Sensory Assessment (Somatosensory) unable/difficult to assess  -           User Key  (r) = Recorded By, (t) = Taken By, (c) = Cosigned By    Initials Name Provider Type    Becky Larson, PT Physical Therapist               Goals/Plan    No documentation.                Clinical Impression     [facility] Name 09/11/22 1143          Pain    Pre/Posttreatment Pain Comment Pt reporting pain with movement of any extremity, unable to quantify pain due to impaired cognition  -     Pain Intervention(s) Repositioned;Rest  -     Row Name 09/11/22 1143          Plan of Care Review    Outcome Evaluation Pt is 70 yo female admitted from [facility] for confusion with d/o UTI and MEGAN.  CT head (-) acute changes.  PMH: dementia, CVA with right side hemiparesis, HTN, seizures.  Pt only oriented to name so PLOF unable to be determined at this time.  However due to deficits with ROM and mobility this date PT will assume pt is maxA-depA for ADls and mobility at [facility] until further clarified.  Pt is facility resident at [facility].  On this date, pt presents with confusion and resistance to movement.  Pt reports pain with any movement but is unable to quantify or qualify pain.  Pt requiring depAx2 for supine<->  sit bed mobility and maxA-maxAx2 to sit EOB.   PT recommending use of Abilio lift for transfers at this time.  Pt appears close to functional mobility baseline.  Plan return to ECF.  No further inpatient PT needs.  PT will sign off at this time.  -     Row Name 09/11/22 1143          Therapy Assessment/Plan (PT)    Patient/Family Therapy Goals Statement (PT) unable to state due to impaired cognition  -     Criteria for Skilled Interventions Met (PT) no;no problems identified which require skilled intervention  -     Therapy Frequency (PT) evaluation only  -     Row Name 09/11/22 1143          Positioning and Restraints    Pre-Treatment Position in bed  -HC     Post Treatment Position bed  -HC     In Bed notified nsg;call light within reach;encouraged to call for assist;exit alarm on  -           User Key  (r) = Recorded By, (t) = Taken By, (c) = Cosigned By    Initials Name Provider Type    HC Becky Zepeda, PT Physical Therapist               Outcome Measures     Row Name 09/11/22 1149          How much help from another person do you currently need...    Turning from your back to your side while in flat bed without using bedrails? 1  -HC     Moving from lying on back to sitting on the side of a flat bed without bedrails? 1  -HC     Moving to and from a bed to a chair (including a wheelchair)? 1  -HC     Standing up from a chair using your arms (e.g., wheelchair, bedside chair)? 1  -HC     Climbing 3-5 steps with a railing? 1  -HC     To walk in hospital room? 1  -HC     AM-PAC 6 Clicks Score (PT) 6  -HC     Highest level of mobility 2 --> Bed activities/dependent transfer  -     Row Name 09/11/22 1149          Modified Jenny Scale    Modified Jenny Scale 5 - Severe disability.  Bedridden, incontinent, and requiring constant nursing care and attention.  -     Row Name 09/11/22 1149 09/11/22 1132       Functional Assessment    Outcome Measure Options AM-PAC 6 Clicks Basic Mobility (PT);Modified  [place]  [facility] AM-PAC 6 Clicks Daily Activity (OT)  -SB          User Key  (r) = Recorded By, (t) = Taken By, (c) = Cosigned By    Initials Name Provider Type     Becky Zepeda, PT Physical Therapist    SB Eve Gallegos OT Occupational Therapist                             Physical Therapy Education                 Title: PT OT SLP Therapies (In Progress)     Topic: Physical Therapy (In Progress)     Point: Mobility training (In Progress)     Learning Progress Summary           Patient Acceptance, E, NR by  at 9/11/2022 1149                   Point: Precautions (In Progress)     Learning Progress Summary           Patient Acceptance, E, NR by  at 9/11/2022 1149                               User Key     Initials Effective Dates Name Provider Type Discipline     06/16/21 -  Becky Zepeda, PT Physical Therapist PT              PT Recommendation and Plan     Plan of Care Reviewed With: patient  Outcome Evaluation: Pt is 72 yo female admitted from [facility] for confusion with d/o UTI and MEGAN.  CT head (-) acute changes.  PMH: dementia, CVA with right side hemiparesis, HTN, seizures.  Pt only oriented to name so PLOF unable to be determined at this time.  However due to deficits with ROM and mobility this date PT will assume pt is maxA-depA for ADls and mobility at [facility] until further clarified.  Pt is facility resident at [facility].  On this date, pt presents with confusion and resistance to movement.  Pt reports pain with any movement but is unable to quantify or qualify pain.  Pt requiring depAx2 for supine<-> sit bed mobility and maxA-maxAx2 to sit EOB.   PT recommending use of Abilio lift for transfers at this time.  Pt appears close to functional mobility baseline.  Plan return to [facility].  No further inpatient PT needs.  PT will sign off at this time.     Time Calculation:    PT Charges     Row Name 09/11/22 1150             Time Calculation    Start Time 0907  -      Stop Time 0921  -      Time  Calculation (min) 14 min  -HC      PT Received On 09/11/22  -HC              Time Calculation- PT    Total Timed Code Minutes- PT 0 minute(s)  -HC            User Key  (r) = Recorded By, (t) = Taken By, (c) = Cosigned By    Initials Name Provider Type     Becky Zepeda, PT Physical Therapist              Therapy Charges for Today     Code Description Service Date Service Provider Modifiers Qty    [id number] HC PT EVAL MOD COMPLEXITY 3 9/11/2022 Becky Zepeda, PT GP 1          PT G-Codes  Outcome Measure Options: AM-PAC 6 Clicks Basic Mobility (PT), Modified Jenny  AM-PAC 6 Clicks Score (PT): 6  AM-PAC 6 Clicks Score (OT): 7  Modified Waka Scale: 5 - Severe disability.  Bedridden, incontinent, and requiring constant nursing care and attention.    Becky Zepeda, PT  9/11/2022

## 2022-09-11 NOTE — PLAN OF CARE
Goal Outcome Evaluation:  Plan of Care Reviewed With: patient        Progress: no change  Outcome Evaluation: Pt is a 72 y/o female presenting to ER on 9/8 with c/o AMS as noted by ECF staff. Pt found to have UTI. PMHx: CVA, HTN, seizures. Pt supine in bed at start of session. Pt demo'ing severe abnormal tone in RUE as noted above. Pt dependent x2 for sup>sit EOB. Pt required Max-Ax2 for maintaining static sitting balance at EOB. Pt dependent for grooming in supported sit at EOB this session. Pt demo'ing functional indpendence of her baseline and would not benefit from continued skilled services.

## 2022-09-11 NOTE — PLAN OF CARE
Goal Outcome Evaluation:  Plan of Care Reviewed With: patient        Progress: no change  Outcome Evaluation: Patient in bed eyes closed resting at this time.  Tolentino cath changed replace this shift.  Previous tolentino cath not draining properly.  Lots of sediment in urine not allowing the urine to drain in the drainage bag.  New tolentino cath placed.  Still having issues with the urine draining because of large amount of sediment. New order to consult Uriology.  Will continue to monitor.

## 2022-09-11 NOTE — PROGRESS NOTES
[facility] Medicine Services Daily Progress Note    Patient Name: Jeremiah Henson  : 1950  MRN: [id number]  Primary Care Physician:  Anna Barkley MD  Date of admission: 2022      Subjective      Chief Complaint: Alteration in mentation    Review of Systems   Unable to perform ROS: mental status change      9/10/2022  Today the patient was more easily arousable.  She would smile and give 1 maybe 2 word answers.    2022  Today the patient was able to hold a conversation.  The patient was able to tell me how she felt, answer simple questions which is a major improvement over admission.  Objective      Vitals:   Temp:  [96.6 °F (35.9 °C)-98.1 °F (36.7 °C)] 96.6 °F (35.9 °C)  Heart Rate:  [53-81] 76  Resp:  [16-18] 18  BP: (128-161)/(71-82) 144/80    Physical Exam  Vitals and nursing note reviewed.   Constitutional:       General: She is not in acute distress.     Appearance: Normal appearance. She is well-developed. She is not ill-appearing, toxic-appearing or diaphoretic.      Comments: The patient is moderately obese with a BMI of 31.   HENT:      Head: Normocephalic and atraumatic.      Right Ear: Ear canal and external ear normal.      Left Ear: Ear canal and external ear normal.      Nose: Nose normal. No congestion or rhinorrhea.      Mouth/Throat:      Mouth: Mucous membranes are moist.      Pharynx: No oropharyngeal exudate.   Eyes:      General: No scleral icterus.        Right eye: No discharge.         Left eye: No discharge.      Extraocular Movements: Extraocular movements intact.      Conjunctiva/sclera: Conjunctivae normal.      Pupils: Pupils are equal, round, and reactive to light.   Neck:      Thyroid: No thyromegaly.      Vascular: No carotid bruit or JVD.      Trachea: No tracheal deviation.   Cardiovascular:      Rate and Rhythm: Normal rate and regular rhythm.      Pulses: Normal pulses.      Heart sounds: Normal heart sounds. No murmur heard.    No  friction rub. No gallop.   Pulmonary:      Effort: Pulmonary effort is normal. No respiratory distress.      Breath sounds: Normal breath sounds. No stridor. No wheezing, rhonchi or rales.   Chest:      Chest wall: No tenderness.   Abdominal:      General: Bowel sounds are normal. There is no distension.      Palpations: Abdomen is soft. There is no mass.      Tenderness: There is no abdominal tenderness. There is no guarding or rebound.      Hernia: No hernia is present.   Musculoskeletal:         General: No swelling, tenderness, deformity or signs of injury. Normal range of motion.      Cervical back: Normal range of motion and neck supple. No rigidity. No muscular tenderness.      Right lower leg: No edema.      Left lower leg: No edema.   Lymphadenopathy:      Cervical: No cervical adenopathy.   Skin:     General: Skin is warm and dry.      Coloration: Skin is not jaundiced or pale.      Findings: No bruising, erythema or rash.   Neurological:      Mental Status: She is alert.      Cranial Nerves: No cranial nerve deficit.      Sensory: No sensory deficit.      Motor: No weakness or abnormal muscle tone.      Coordination: Coordination normal.      Comments: Today the patient was able to answer simple questions in a timely fashion.  She did know where she was and why she was here.   Psychiatric:         Mood and Affect: Mood normal.         Behavior: Behavior normal.         Thought Content: Thought content normal.         Judgment: Judgment normal.           Result Review    Result Review:  I have personally reviewed the results from the time of this admission to 9/11/2022 16:40 EDT and agree with these findings:  [x]  Laboratory  [x]  Microbiology  [x]  Radiology  []  EKG/Telemetry   []  Cardiology/Vascular   []  Pathology  []  Old records  []  Other:  Most notable findings include: Elevated levels of Dilantin phenobarb and Keppra    Wounds (last 24 hours)     LDA Wound     Row Name 09/11/22 1200 09/11/22  0800 09/10/22 2015       Wound 09/08/22 1820 sacral spine Pressure Injury    Wound - Properties Group Placement Date: 09/08/22  -ER Placement Time: 1820  -ER Present on Hospital Admission: Y  -ER Location: sacral spine  -ER Primary Wound Type: Pressure inj  -ER    Pressure Injury Stage -- 2  -SS --    Closure Adhesive bandage  -SS Adhesive bandage  -SS --    Base non-blanchable;red  -SS non-blanchable;red  -SS --    Drainage Amount none  -SS none  -SS none  -MF    Dressing Care -- silicone  -SS --    Retired Wound - Properties Group Placement Date: 09/08/22  -ER Placement Time: 1820  -ER Present on Hospital Admission: Y  -ER Location: sacral spine  -ER Primary Wound Type: Pressure inj  -ER    Retired Wound - Properties Group Date first assessed: 09/08/22  -ER Time first assessed: 1820  -ER Present on Hospital Admission: Y  -ER Location: sacral spine  -ER Primary Wound Type: Pressure inj  -ER       Wound 09/08/22 1821 Right posterior greater trochanter    Wound - Properties Group Placement Date: 09/08/22  -ER Placement Time: 1821  -ER Side: Right  -ER Orientation: posterior  -ER Location: greater trochanter  -ER    Closure Adhesive closure strips  -SS Adhesive closure strips  -SS --    Base blanchable  -SS blanchable  -SS --    Drainage Amount none  -SS none  -SS none  -MF    Dressing Care -- silicone  -SS --    Retired Wound - Properties Group Placement Date: 09/08/22  -ER Placement Time: 1821  -ER Side: Right  -ER Orientation: posterior  -ER Location: greater trochanter  -ER    Retired Wound - Properties Group Date first assessed: 09/08/22  -ER Time first assessed: 1821  -ER Side: Right  -ER Location: greater trochanter  -ER          User Key  (r) = Recorded By, (t) = Taken By, (c) = Cosigned By    Initials Name Provider Type    Mary Lobato LPN Licensed Nurse    Nola Antunez LPN Licensed Nurse    Anh Castro RN Registered Nurse                  Assessment & Plan      Brief Patient  Summary:  Jeremiah Henson is a 71 y.o. female who presented from an extended care facility with chief complaint of alteration in mentation.  Unfortunately the patient is not able to give me much of a history.  The patient has issues with electrolytes and a past medical history of seizure disorder.  At the time of my visit the patient was really not able to answer any questions.      amLODIPine, 10 mg, Oral, Nightly  atorvastatin, 10 mg, Oral, Nightly  cefTRIAXone, 2 g, Intravenous, Q24H  Divalproex Sodium, 500 mg, Oral, Q12H  docusate sodium, 200 mg, Oral, BID  [START ON 9/12/2022] enoxaparin, 70 mg, Subcutaneous, Q12H  hydrALAZINE, 50 mg, Oral, TID  levETIRAcetam, 750 mg, Oral, BID  lidocaine, 1 patch, Transdermal, Q24H  metoprolol tartrate, 50 mg, Oral, Daily  phenytoin extended, 200 mg, Oral, BID  polyethylene glycol, 17 g, Oral, Daily  sertraline, 50 mg, Oral, Daily  sodium chloride, 10 mL, Intravenous, Q12H             Active Hospital Problems:  Active Hospital Problems    Diagnosis    • Altered mental status, unspecified altered mental status type      Plan:   Altered mental status--likely metabolic encephalopathy related to acute kidney injury, hypernatremia; UTI; consideration for elevated Dilantin level: Monitor for improvement with electrolyte improvement and treatment for UTI  -Improving now that her electrolyte abnormalities and her seizure medications are therapeutic    UTI: IV Rocephin; add urine culture to urinalysis specimen  -Blood cultures pending  -Lactate negative at 0.8     MEGAN, creatinine 1.48 with comparison 0.65--likely secondary to decreased oral intake: Bladder scan x1; IV fluids D5 and a half at 125; repeat BMP in a.m.  -Patient received 1500 cc normal saline in ER  -IV fluids D5 and a half for maintenance secondary to hypernatremia, mild, sodium 146  -Anion gap 18; CO2 24     Elevated phenytoin level 21.6--likely secondary to dehydration, acute kidney injury: Hold Dilantin  -Consider  mild decrease in Dilantin on discharge  -We will continue to hold the patient's Keppra but will restart the patient's Dilantin and her Depakote.  Those levels have come down.    Atrial fibrillation, chronic with elevated digoxin level: Hold digoxin; hold Eliquis; bridge Lovenox based on creatinine clearance 36 at 80 mg/kg every 12 hours; hold metoprolol secondary to borderline blood pressure; check EKG  -Digoxin level 1.40     Hypertension, chronic, uncontrolled with current hypotension: Hold Norvasc; hold lisinopril; hold metoprolol; hold hydralazine; cautious IV fluids     HLD, chronic: Hold Lipitor     Chronic pain: Hold baclofen secondary to altered mental status; hold Norco     Anxiety, chronic: Hold BuSpar secondary to altered mental status; hold Zoloft secondary to altered mental status     Seizure disorder, chronic:   -Continue Depakote and Dilantin.  Hold Keppra  -Valproic acid level normal  -Dilantin level 9  -We will add as needed Ativan for breakthrough seizure    Hypertriglyceridemia: Hold Tricor     Hypernatremia  -Changed to D5W at 75 mL an hour and check basic metabolic profile every 8 hours.  Need to lower the patient's serum sodium but gradually.  The patient is not able to eat or drink at this time so we will need to do this with IV hydration.  -Her labs this morning were hemolyzed and the lab has to come back and get another basic metabolic profile.  That has been ordered and we are awaiting those results.       -May be able to discontinue the D5W if her serum sodium is around 140 or less.    DVT prophylaxis:  Medical DVT prophylaxis orders are present.    CODE STATUS:    Level Of Support Discussed With: Patient  Code Status (Patient has no pulse and is not breathing): CPR (Attempt to Resuscitate)  Medical Interventions (Patient has pulse or is breathing): Full Support      Disposition:  I expect patient to be discharged when clinically appropriate.    This patient has been examined wearing  appropriate Personal Protective Equipment and discussed with hospital infection control department. 09/11/22      Electronically signed by Valarie Hermosillo MD, 09/11/22, 16:40 EDT.  [facility] Hospitalist Team

## 2022-09-11 NOTE — PLAN OF CARE
Goal Outcome Evaluation:  Plan of Care Reviewed With: patient        Progress: improving   Patient more alert today, tolentino cath irrigated every couple hours to keep patency maintained, patient kept turned

## 2022-09-11 NOTE — PLAN OF CARE
Problem: Adult Inpatient Plan of Care  Goal: Plan of Care Review  Outcome: Ongoing, Progressing  Flowsheets  Taken 9/11/2022 1149  Plan of Care Reviewed With: patient  Taken 9/11/2022 1143  Outcome Evaluation: Pt is 70 yo female admitted from [facility] for confusion with d/o UTI and MEGAN.  CT head (-) acute changes.  PMH: dementia, CVA with right side hemiparesis, HTN, seizures.  Pt only oriented to name so PLOF unable to be determined at this time.  However due to deficits with ROM and mobility this date PT will assume pt is maxA-depA for ADls and mobility at [facility] until further clarified.  Pt is facility resident at [facility].  On this date, pt presents with confusion and resistance to movement.  Pt reports pain with any movement but is unable to quantify or qualify pain.  Pt requiring depAx2 for supine<-> sit bed mobility and maxA-maxAx2 to sit EOB.   PT recommending use of Abilio lift for transfers at this time.  Pt appears close to functional mobility baseline.  Plan return to [facility].  No further inpatient PT needs.  PT will sign off at this time.

## 2022-09-11 NOTE — CONSULTS
"Primary Care Provider: Anna Barkley MD     Consult requested by:  Dr. Hermosillo    Reason for Consultation: Neurological evaluation, altered mental status    History taken from: patient chart RN    Chief complaint: confusion, decreased LOC      SUBJECTIVE:    History of present illness: Background per H&P: Jeremiah Henson is a 71 y.o. female who presented to [facility] on 9/8/2022 from the [facility] complaining of altered mental status as noted by [facility] staff.  At time of interview the patient moans and calls for her mother.  The patient is noted to have dry mucosa.  She moves her extremities with weakness continuously but not to command.  She crosses her left leg over her right.  Where her left leg touches her right there is an area consistent with an old wound, possibly pressure wound that is completely healed.  Patient is unable to provide any information about her recent review of systems or past medical history.    - Portions of the above HPI were copied from previous encounters and edited as appropriate. PMH as detailed below.     Patient is a poor historian, no family bedside.  She is somewhat behavioral as well and states \" leave me alone\" and \" get off me\" multiple times during the assessment.  She was awake and oriented to name and hospital.     Review of Systems   Unable to perform ROS: Mental status change        PATIENT HISTORY:  Past Medical History:   Diagnosis Date   • CVA (cerebral vascular accident) (HCC)    • Hyperlipidemia    • Hypertension    • Seizure (HCC)    • Sleep apnea    ,   Past Surgical History:   Procedure Laterality Date   • COLONOSCOPY N/A 11/19/2020    Procedure: COLONOSCOPY WITH POLYPECTOMY X 2;  Surgeon: Abelardo Garay MD;  Location: [facility];  Service: Gastroenterology;  Laterality: N/A;  COLON POLYPS   • HIP SURGERY     • PACEMAKER IMPLANTATION     ,   Family History   Problem Relation Age of Onset   • Lung cancer Father    • Heart attack Father    , "   Social History     Tobacco Use   • Smoking status: Former Smoker     Years: 20.00   • Smokeless tobacco: Never Used   Substance Use Topics   • Alcohol use: No   • Drug use: No   ,   Prior to Admission medications    Medication Sig Start Date End Date Taking? Authorizing Provider   acetaminophen (TYLENOL) 325 MG tablet Take 650 mg by mouth Every 8 (Eight) Hours As Needed for Mild Pain , Moderate Pain  or Fever.    Som Stringer MD   acetaminophen (Tylenol) 325 MG tablet Take 650 mg by mouth 2 (Two) Times a Day.    Som Stringer MD   amLODIPine (NORVASC) 10 MG tablet Take 10 mg by mouth Every Night. 5/7/15   Som Stringer MD   atorvastatin (LIPITOR) 10 MG tablet Take 10 mg by mouth Every Night. 7/21/16   Som Stringer MD   baclofen (LIORESAL) 10 MG tablet Take 15 mg by mouth 3 (Three) Times a Day.    Som Stringer MD   bisacodyl (DULCOLAX) 10 MG suppository Insert 10 mg into the rectum Daily As Needed. 5/20/21   Som Stringer MD   busPIRone (BUSPAR) 5 MG tablet Take 5 mg by mouth 2 (Two) Times a Day.    Som Stringer MD   Cholecalciferol (Vitamin D3) 50 MCG (2000 UT) tablet Take  by mouth Daily.    Som Stringer MD   Diclofenac Sodium (VOLTAREN) 1 % gel gel 2 (Two) Times a Day. Right Knee/ Right Shoulder 7/21/21   Som Stringer MD   digoxin (LANOXIN) 250 MCG tablet Take 250 mcg by mouth Daily. Hold For Pulse <60    Som Stringer MD   divalproex (DEPAKOTE) 125 MG DR tablet Take 250 mg by mouth 3 (Three) Times a Day.    Som Stringer MD   docusate sodium (COLACE) 100 MG capsule Take 200 mg by mouth 2 (Two) Times a Day.    Som Stringer MD   Eliquis 5 MG tablet tablet Take 5 mg by mouth 2 (Two) Times a Day. 7/30/21   Som Stringer MD   fenofibrate (TRICOR) 48 MG tablet Take 48 mg by mouth Daily.    Som Stringer MD   hydrALAZINE (APRESOLINE) 50 MG tablet Take 50 mg by mouth 3 (Three) Times a Day.    Myke  MD Som   HYDROcodone-acetaminophen (NORCO) 5-325 MG per tablet Take 1 tablet by mouth 2 (Two) Times a Day.    Som Stringer MD   levETIRAcetam (KEPPRA) 500 MG tablet Take 500 mg by mouth 2 (Two) Times a Day. 5/7/15   Som Stringer MD   Lidocaine 4 % patch Apply  topically Daily. Right Shoulder    Som Stringer MD   lisinopril (PRINIVIL,ZESTRIL) 40 MG tablet Take 40 mg by mouth Daily. 7/21/16   Som Stringer MD   metoprolol tartrate (LOPRESSOR) 100 MG tablet Take 100 mg by mouth Daily.    Som Stringer MD   Omega-3 1000 MG capsule Take 1 capsule by mouth 2 (two) times a day.    Som Stringer MD   phenytoin extended (DILANTIN) 200 MG ER capsule Take 200 mg by mouth 2 (Two) Times a Day.    Som Stringer MD   polyethylene glycol (MIRALAX) 17 g packet Take 17 g by mouth Daily.    Som Stringer MD   senna (senna) 8.6 MG tablet Take 2 tablets by mouth 2 (Two) Times a Day.    Som Stringer MD   sertraline (ZOLOFT) 50 MG tablet Take 50 mg by mouth Daily.    Som Stringer MD    Allergies:  Patient has no known allergies.    Current Facility-Administered Medications   Medication Dose Route Frequency Provider Last Rate Last Admin   • acetaminophen (TYLENOL) tablet 650 mg  650 mg Oral Q4H PRN Valarie Hermosillo MD   650 mg at 09/10/22 2021    Or   • acetaminophen (TYLENOL) 160 MG/5ML solution 650 mg  650 mg Oral Q4H PRN Valarie Hermosillo MD        Or   • acetaminophen (TYLENOL) suppository 650 mg  650 mg Rectal Q4H PRN Valarie Hermosillo MD       • amLODIPine (NORVASC) tablet 10 mg  10 mg Oral Nightly Valarie Hermosillo MD   10 mg at 09/10/22 2020   • atorvastatin (LIPITOR) tablet 10 mg  10 mg Oral Nightly Valarie Hermosillo MD   10 mg at 09/10/22 2021   • bisacodyl (DULCOLAX) suppository 10 mg  10 mg Rectal Daily PRN Valarie Hermosillo MD       • cefTRIAXone (ROCEPHIN) 2 g in sodium chloride 0.9 % 100 mL IVPB  2 g Intravenous Q24H Valarie Hermosillo  mL/hr at  09/10/22 1445 2 g at 09/10/22 1445   • Divalproex Sodium (DEPAKOTE SPRINKLE) capsule 250 mg  250 mg Oral Q8H Valarie Hermosillo MD   250 mg at 09/11/22 0835   • docusate sodium (COLACE) capsule 200 mg  200 mg Oral BID Valarie Hermosillo MD   200 mg at 09/11/22 0835   • Enoxaparin Sodium (LOVENOX) syringe 80 mg  1 mg/kg Subcutaneous Q12H Valarie Hermosillo MD   80 mg at 09/10/22 2331   • hydrALAZINE (APRESOLINE) injection 10 mg  10 mg Intravenous Q6H PRN Valarie Hermosillo MD   10 mg at 09/10/22 1025   • hydrALAZINE (APRESOLINE) tablet 50 mg  50 mg Oral TID Valarie Hermosillo MD   50 mg at 09/11/22 0835   • lidocaine (LIDODERM) 5 % 1 patch  1 patch Transdermal Q24H Valarie Hermosillo MD       • metoprolol tartrate (LOPRESSOR) tablet 50 mg  50 mg Oral Daily Valarie Hermosillo MD   50 mg at 09/11/22 0835   • ondansetron (ZOFRAN) tablet 4 mg  4 mg Oral Q6H PRN Valarie Hermosillo MD        Or   • ondansetron (ZOFRAN) injection 4 mg  4 mg Intravenous Q6H PRN Valarie Hermosillo MD       • phenytoin ER (DILANTIN) capsule 200 mg  200 mg Oral BID Valarie Hermosillo MD   200 mg at 09/11/22 0835   • polyethylene glycol (MIRALAX) packet 17 g  17 g Oral Daily Valarie Hermosillo MD   17 g at 09/11/22 0840   • sertraline (ZOLOFT) tablet 50 mg  50 mg Oral Daily Valarie Hermosillo MD   50 mg at 09/11/22 0840   • sodium chloride 0.9 % flush 10 mL  10 mL Intravenous PRN Valarie Hermosillo MD   10 mL at 09/08/22 1323   • sodium chloride 0.9 % flush 10 mL  10 mL Intravenous Q12H Valarie Hermosillo MD   10 mL at 09/11/22 0836   • sodium chloride 0.9 % flush 10 mL  10 mL Intravenous PRN Jaimee, Valarie, MD            ________________________________________________________        OBJECTIVE:    PHYSICAL EXAM:    Constitutional: The patient is in no apparent distress, eyes closed when entering room.   Head: Normocephalic, atraumatic.   Chest: No respiratory distress.  Cardiac: Regular rate and rhythm.   Extremities:  No clubbing, cyanosis, or edema.     NEUROLOGICAL:    Cognition:   Eyes closed but  "responds verbally.   Forcefully closes eyes when trying to visualize pupils.  She responds multiple times \" my eyes hurt, get off me\" when trying to assess.  Afterwards her eyes opened spontaneously, she was very purposeful.   Encephalopathic, oriented to name and hospital  Opens eyes to physical stimulation  Does not follow simple commands, responds \" leave me alone\" when asked to squeeze hands or wiggle toes  Exam limited given mental status      Cranial nerves;  Right facial asymmetry   Pupils equal and reactive  Tracking   Right ptosis    Motor: Right hemiplegia from old stroke.  Moves left arm and leg spontaneous but does not follow commands.     Cerebellar and gait not tested given patient's mental status    Physical exam performed by TRINA Haddad.     ________________________________________________________   RESULTS REVIEW:    VITAL SIGNS:   Temp:  [96.5 °F (35.8 °C)-98.1 °F (36.7 °C)] 98.1 °F (36.7 °C)  Heart Rate:  [] 81  Resp:  [16-18] 16  BP: (126-161)/(60-82) 155/82     LABS:      Lab 09/10/22  2257 09/10/22  1035 09/09/22 2016 09/09/22  0518 09/08/22  1307 09/08/22  1200   WBC 3.10* 3.60  --  3.80  --  3.60   HEMOGLOBIN 11.0* 11.1*  --  11.5*  --  11.8*   HEMATOCRIT 33.2* 33.1*  --  34.5  --  35.8   PLATELETS 190 206  --  254  --  268   NEUTROS ABS 1.60* 1.60*  --  2.10  --  1.70   LYMPHS ABS 1.10 1.50  --  1.30  --  1.50   MONOS ABS 0.30 0.50  --  0.40  --  0.40   EOS ABS 0.00 0.00  --  0.00  --  0.00   MCV 97.3* 98.3*  --  98.8*  --  101.2*   PROCALCITONIN  --   --   --   --   --  0.12   LACTATE  --  0.9 2.5*  --  0.8  --          Lab 09/11/22  0030 09/09/22  0518 09/08/22  1200   SODIUM 140 147* 146*   POTASSIUM 3.7 3.9 4.5   CHLORIDE 103 107 104   CO2 28.0 23.0 24.0   ANION GAP 9.0 17.0* 18.0*   BUN 31* 51* 57*   CREATININE 0.66 1.14* 1.48*   EGFR 92.8 50.9* 37.2*   GLUCOSE 129* 142* 114*   CALCIUM 8.5* 9.0 9.5         Lab 09/08/22  1200   TOTAL PROTEIN 7.0   ALBUMIN 3.80   GLOBULIN 3.2 "   ALT (SGPT) 8   AST (SGOT) 14   BILIRUBIN 0.2   ALK PHOS 36*                     UA    Urinalysis 9/8/22 9/8/22    1203 1203   Squamous Epithelial Cells, UA  3-6 (A)   Specific Gravity, UA 1.021    Ketones, UA 15 mg/dL (1+) (A)    Blood, UA Small (1+) (A)    Leukocytes, UA Large (3+) (A)    Nitrite, UA Negative    RBC, UA  6-12 (A)   WBC, UA  Too Numerous to Count (A)   Bacteria, UA  3+ (A)   (A) Abnormal value              Lab Results   Component Value Date    TSH 1.070 11/18/2020     (H) 10/04/2017    LXNKOBTV40 1,151 (H) 10/04/2017    PHENYTOIN 9.7 (L) 09/09/2022       IMAGING STUDIES:  No radiology results for the last day    I reviewed the patient's new clinical results.    ________________________________________________________     PROBLEM LIST:    Altered mental status, unspecified altered mental status type            ASSESSMENT/PLAN:  1. Acute encephalopathy, likely metabolic due to UTI, MEGAN (improving), hypernatremia.   - CT head reviewed, large left MCA territory encephalomalacia from old stroke, right occipital lobe encephalomalacia from old stroke.  There are no new acute findings.   - Check TSH, B12, ammonia  - Medications & labs reviewed   - Continue to treat underlying conditions   - Will follow    2. History of seizures  -Valproic level 7.6, increase Depakote to 500 mg BID   -Phenytoin level 21.6 on 9/8, Repeat level 13.0- continue Dilantin 200 mg BID   -Increase Keppra 750mg BID     3. Urinary tract infection  -Culture pending, antibiotics per primary    4. History of atrial fibrillation, on Eliquis   -Patient currently on therapeutic Lovenox, resume Eliquis when able    5. Obstructive sleep apnea  -CPAP in hospital    Will follow PRN.     Lorena Bocanegra, JESUS  09/11/22  11:13 EDT

## 2022-09-12 LAB
AMMONIA BLD-SCNC: 35 UMOL/L (ref 11–51)
ANION GAP SERPL CALCULATED.3IONS-SCNC: 9 MMOL/L (ref 5–15)
BASOPHILS # BLD AUTO: 0 10*3/MM3 (ref 0–0.2)
BASOPHILS NFR BLD AUTO: 0.9 % (ref 0–1.5)
BUN SERPL-MCNC: 24 MG/DL (ref 8–23)
BUN/CREAT SERPL: 46.2 (ref 7–25)
CALCIUM SPEC-SCNC: 8 MG/DL (ref 8.6–10.5)
CHLORIDE SERPL-SCNC: 101 MMOL/L (ref 98–107)
CO2 SERPL-SCNC: 29 MMOL/L (ref 22–29)
CREAT SERPL-MCNC: 0.52 MG/DL (ref 0.57–1)
DEPRECATED RDW RBC AUTO: 48.1 FL (ref 37–54)
EGFRCR SERPLBLD CKD-EPI 2021: 98.2 ML/MIN/1.73
EOSINOPHIL # BLD AUTO: 0 10*3/MM3 (ref 0–0.4)
EOSINOPHIL NFR BLD AUTO: 0.7 % (ref 0.3–6.2)
ERYTHROCYTE [DISTWIDTH] IN BLOOD BY AUTOMATED COUNT: 14 % (ref 12.3–15.4)
GLUCOSE SERPL-MCNC: 110 MG/DL (ref 65–99)
HCT VFR BLD AUTO: 32 % (ref 34–46.6)
HGB BLD-MCNC: 10.8 G/DL (ref 12–15.9)
LYMPHOCYTES # BLD AUTO: 1.4 10*3/MM3 (ref 0.7–3.1)
LYMPHOCYTES NFR BLD AUTO: 41.3 % (ref 19.6–45.3)
MCH RBC QN AUTO: 32.9 PG (ref 26.6–33)
MCHC RBC AUTO-ENTMCNC: 33.8 G/DL (ref 31.5–35.7)
MCV RBC AUTO: 97.3 FL (ref 79–97)
MONOCYTES # BLD AUTO: 0.3 10*3/MM3 (ref 0.1–0.9)
MONOCYTES NFR BLD AUTO: 9.9 % (ref 5–12)
NEUTROPHILS NFR BLD AUTO: 1.6 10*3/MM3 (ref 1.7–7)
NEUTROPHILS NFR BLD AUTO: 47.2 % (ref 42.7–76)
NRBC BLD AUTO-RTO: 0.1 /100 WBC (ref 0–0.2)
PLATELET # BLD AUTO: 156 10*3/MM3 (ref 140–450)
PMV BLD AUTO: 8 FL (ref 6–12)
POTASSIUM SERPL-SCNC: 3.6 MMOL/L (ref 3.5–5.2)
RBC # BLD AUTO: 3.29 10*6/MM3 (ref 3.77–5.28)
SODIUM SERPL-SCNC: 139 MMOL/L (ref 136–145)
WBC NRBC COR # BLD: 3.5 10*3/MM3 (ref 3.4–10.8)

## 2022-09-12 PROCEDURE — 85025 COMPLETE CBC W/AUTO DIFF WBC: CPT | Performed by: INTERNAL MEDICINE

## 2022-09-12 PROCEDURE — 82140 ASSAY OF AMMONIA: CPT | Performed by: NURSE PRACTITIONER

## 2022-09-12 PROCEDURE — 80048 BASIC METABOLIC PNL TOTAL CA: CPT | Performed by: INTERNAL MEDICINE

## 2022-09-12 PROCEDURE — 92610 EVALUATE SWALLOWING FUNCTION: CPT

## 2022-09-12 PROCEDURE — 99233 SBSQ HOSP IP/OBS HIGH 50: CPT | Performed by: INTERNAL MEDICINE

## 2022-09-12 PROCEDURE — 25010000002 ENOXAPARIN PER 10 MG: Performed by: INTERNAL MEDICINE

## 2022-09-12 PROCEDURE — 25010000002 CEFTRIAXONE PER 250 MG: Performed by: INTERNAL MEDICINE

## 2022-09-12 RX ADMIN — LEVETIRACETAM 750 MG: 500 TABLET, FILM COATED ORAL at 20:42

## 2022-09-12 RX ADMIN — HYDRALAZINE HYDROCHLORIDE 50 MG: 25 TABLET, FILM COATED ORAL at 20:43

## 2022-09-12 RX ADMIN — Medication 10 ML: at 20:43

## 2022-09-12 RX ADMIN — ATORVASTATIN CALCIUM 10 MG: 10 TABLET, FILM COATED ORAL at 20:43

## 2022-09-12 RX ADMIN — ENOXAPARIN SODIUM 70 MG: 100 INJECTION SUBCUTANEOUS at 13:59

## 2022-09-12 RX ADMIN — Medication 10 ML: at 08:50

## 2022-09-12 RX ADMIN — ENOXAPARIN SODIUM 70 MG: 100 INJECTION SUBCUTANEOUS at 02:32

## 2022-09-12 RX ADMIN — DOCUSATE SODIUM 200 MG: 100 CAPSULE, LIQUID FILLED ORAL at 08:49

## 2022-09-12 RX ADMIN — METOPROLOL TARTRATE 50 MG: 50 TABLET, FILM COATED ORAL at 08:49

## 2022-09-12 RX ADMIN — SERTRALINE HYDROCHLORIDE 50 MG: 50 TABLET, FILM COATED ORAL at 08:49

## 2022-09-12 RX ADMIN — CEFTRIAXONE 2 G: 2 INJECTION, POWDER, FOR SOLUTION INTRAMUSCULAR; INTRAVENOUS at 13:59

## 2022-09-12 RX ADMIN — DIVALPROEX SODIUM 500 MG: 125 CAPSULE, COATED PELLETS ORAL at 20:42

## 2022-09-12 RX ADMIN — LEVETIRACETAM 750 MG: 500 TABLET, FILM COATED ORAL at 08:49

## 2022-09-12 RX ADMIN — HYDRALAZINE HYDROCHLORIDE 50 MG: 25 TABLET, FILM COATED ORAL at 16:55

## 2022-09-12 RX ADMIN — DIVALPROEX SODIUM 500 MG: 125 CAPSULE, COATED PELLETS ORAL at 08:49

## 2022-09-12 RX ADMIN — AMLODIPINE BESYLATE 10 MG: 5 TABLET ORAL at 20:43

## 2022-09-12 RX ADMIN — PHENYTOIN SODIUM 200 MG: 100 CAPSULE ORAL at 20:43

## 2022-09-12 RX ADMIN — APIXABAN 5 MG: 5 TABLET, FILM COATED ORAL at 20:43

## 2022-09-12 RX ADMIN — POLYETHYLENE GLYCOL 3350 17 G: 17 POWDER, FOR SOLUTION ORAL at 08:49

## 2022-09-12 RX ADMIN — HYDRALAZINE HYDROCHLORIDE 50 MG: 25 TABLET, FILM COATED ORAL at 08:49

## 2022-09-12 RX ADMIN — PHENYTOIN SODIUM 200 MG: 100 CAPSULE ORAL at 08:49

## 2022-09-12 NOTE — NURSING NOTE
Called [facility], spoke to the director of nursing. States pt baseline is confusion, pt able to answer some question depending on the question. Usually able to tell her name, her spouse name. Hallucinates at times.

## 2022-09-12 NOTE — THERAPY EVALUATION
Acute Care - Speech Language Pathology   Swallow Initial Evaluation  [place]     Patient Name: Jeremiah Henson  : 1950  MRN: [id number]  Today's Date: 2022               Admit Date: 2022    Visit Dx:     ICD-10-CM ICD-9-CM   1. Altered mental status, unspecified altered mental status type  R41.82 780.97   2. Acute cystitis with hematuria  N30.01 595.0   3. Elevated digoxin level  R78.89 796.0   4. On valproic acid therapy  Z79.899 V58.69   5. Elevated phenytoin level  R78.89 790.6   6. MEGAN (acute kidney injury) (HCC)  N17.9 584.9     Patient Active Problem List   Diagnosis   • Atrial fibrillation (HCC)   • Cerebral infarction (HCC)   • Family history of lung cancer   • Hemiplegia of dominant side (HCC)   • Hyperlipidemia   • Hypertensive disorder   • Seizure disorder (HCC)   • Sleep apnea   • Abscess of back   • Chronic pain disorder   • Closed fracture of right hand   • Seasonal allergic rhinitis   • Urinary tract infectious disease   • Vitamin D deficiency   • Seizure disorder (HCC)   • Abdominal pain   • Anxiety   • Constipation   • Depressive disorder   • Mood disorder (HCC)   • Dislocation of shoulder joint   • Mechanical ileus (HCC)   • Left lower quadrant abdominal pain   • Altered mental status, unspecified altered mental status type     Past Medical History:   Diagnosis Date   • CVA (cerebral vascular accident) (HCC)    • Hyperlipidemia    • Hypertension    • Seizure (HCC)    • Sleep apnea      Past Surgical History:   Procedure Laterality Date   • COLONOSCOPY N/A 2020    Procedure: COLONOSCOPY WITH POLYPECTOMY X 2;  Surgeon: Abelardo Garay MD;  Location: [facility];  Service: Gastroenterology;  Laterality: N/A;  COLON POLYPS   • HIP SURGERY     • PACEMAKER IMPLANTATION         SLP Recommendation and Plan  SLP Swallowing Diagnosis: mild, oral dysphagia, R/O pharyngeal dysphagia (22 1500)  SLP Diet Recommendation: mechanical soft with no mixed consistencies, thin  liquids (09/12/22 1500)  Recommended Precautions and Strategies: upright posture during/after eating, small bites of food and sips of liquid, alternate between small bites of food and sips of liquid, check mouth frequently for oral residue/pocketing, general aspiration precautions, fatigue precautions, 1:1 supervision, assist with feeding (09/12/22 1500)  SLP Rec. for Method of Medication Administration: meds whole, meds crushed, with thin liquids, with pudding or applesauce, as tolerated (09/12/22 1500)     Monitor for Signs of Aspiration: yes, notify SLP if any concerns, cough, elevated WBC count, gurgly voice, throat clearing, fever, upper respiratory, pneumonia, right lower lobe infiltrates (09/12/22 1500)  Recommended Diagnostics: reassess via clinical swallow evaluation, other (see comments) (VFSS if indicated) (09/12/22 1500)  Swallow Criteria for Skilled Therapeutic Interventions Met: demonstrates skilled criteria (09/12/22 1500)  Anticipated Discharge Disposition (SLP): skilled nursing facility (09/12/22 1500)  Rehab Potential/Prognosis, Swallowing: good, to achieve stated therapy goals (09/12/22 1500)  Therapy Frequency (Swallow): 3 days per week, 4 days per week (09/12/22 1500)  Predicted Duration Therapy Intervention (Days): until discharge (09/12/22 1500)         SWALLOW EVALUATION (last 72 hours)     SLP Adult Swallow Evaluation     Row Name 09/12/22 1500          Document Type evaluation  -SM    Subjective Information no complaints  -SM    Patient Observations alert;cooperative  Confused  -SM    Patient/Family/Caregiver Comments/Observations No family/caregivers present  -SM    Patient Effort good  -SM    Comment Clinical swallow evaluation completed this date.  -SM    Symptoms Noted During/After Treatment none  -SM               Patient Profile Reviewed yes  -SM    Pertinent History Of Current Problem The patient is a  71 y.o. female who presented to [facility] on 9/8/2022 from the [facility]  "complaining of altered mental status as noted by F staff. Upon presentation she was very confused. The patient is noted to have dry mucosa.  She moved her extremities with weakness continuously but not to command. Where her left leg touches her right there is an area consistent with an old wound, possibly pressure wound that is completely healed.  Patient is unable to provide any information about her recent review of systems or past medical history. In the ED it was reported:  Pt oriented x0. Pt responds with \"I don't know\" when asked orientation questions. Pt says \"mama\" and \"I hurt\" when asked what hurts pt repeats herself. Pt able to swallow clear liquids with no issue. Pt is a Q2 turn with x2 assist. Room assigned but is currently dirty. VSS.     CXR of 9/8/2022 showed:   IMPRESSION:     1. No acute cardiopulmonary findings.   2. Anterior inferior subluxation or dislocation of the right shoulder.  This has a similar appearance to the chest x-ray from 10/5/2017.  Correlate with physical exam findings.  3. Stable borderline cardiac enlargement.  4. Old right rib fractures.    CT of the head 9/8/2022 revealed:    IMPRESSION:  No evidence of hemorrhage, mass effect or midline shift. No acute  process identified. Stable chronic findings as above.         -SM    Current Method of Nutrition clear liquids  -SM    Prior Level of Function-Communication cognitive-linguistic impairment  -SM    Prior Level of Function-Swallowing regular textures;thin liquids;other (see comments)  Per nurse report, she stated she contacted [facility]/re[facility] and was informed that the patient was receiving a regular consistency/thin liquid diet there, prior to admission  -SM    Plans/Goals Discussed with other (see comments)  Nurse  -SM               Pre/Posttreatment Pain Comment No pain reported  -SM               Dentition Assessment edentulous;edentulous, does not have dentures  -SM    Secretion Management WNL/WFL  -SM    Mucosal " "Quality moist, healthy  -SM    Volitional Swallow unable to elicit  -SM    Volitional Cough unable to elicit  -SM               Oral Motor, Comment The patient did not follow commands to complete an full oral motor assessment. She is verbal and intelligibility is good, however confusion noted. She kept her eyes closed most of the session however was cooperative for po trials  -SM               Respiratory Support Currently in Use room air  -SM    Eating/Swallowing Skills fed by SLP  -SM    Positioning During Eating upright 90 degree;upright in bed  -SM    Utensils Used spoon;straw  -SM    Consistencies Trialed thin liquids;pureed;soft textures;regular textures  -SM               Clinical Swallow Evaluation Summary Clinical swallow evaluation completed this date as per staff request as she is currently receiving a clear liquid diet. Per nurse report, she contacted [facility] and was informed that the patient was receiving a regular consistency, thin liquid diet prior to admission. Head of bed was elevated to approximately 90 degrees by clinician. Unable to obtain history by patient due to confusion. She presented with a \"yes\" bias to simple yes/no questions asked. She was assessed with thin liquid via straw (x 2), puree (applesauce) x 4, mech soft (peaches x 3 trials), soft solid (Fig Ravi) x 2 attempts and regular solid (cracker) x 1 trial. Labial seal on spoon and straw are adequate. She is able to pull thin liquids via straw effectively. Adequate clearance of the spoon noted. Slow mastication noted with Fig Ravi and Peaches. Slight, anterior \"munch\" type chewing noted, however is functional. No anterior labial spillage or oral residue noted. She was unable to bite the cracker and took a large piece whole. Increased mastication time noted with patient requiring a liquid assist to aid in clearing oral cavity following this consistency. No overt pharyngeal findings audible. Vocal quality is " "clear with no \"wet\" quality noted. No cough or throat clearing present at any time.    RECOMMENDATIONS: ST recommends that the patient's diet be upgraded to mechanical soft/thin liquids at this time. She should be up at a full 90 degree angle for all meals/medications. ST will follow up with a full meal assessment and re-evaluation for possible further diet upgrade as indicated. Discussed with nurse and she was in agreement with plan/recommendations.   -               SLP Swallowing Diagnosis mild;oral dysphagia;R/O pharyngeal dysphagia  -    Functional Impact risk of aspiration/pneumonia;risk of malnutrition;risk of dehydration  -    Rehab Potential/Prognosis, Swallowing good, to achieve stated therapy goals  -    Swallow Criteria for Skilled Therapeutic Interventions Met demonstrates skilled criteria  -               Therapy Frequency (Swallow) 3 days per week;4 days per week  -    Predicted Duration Therapy Intervention (Days) until discharge  -    SLP Diet Recommendation mechanical soft with no mixed consistencies;thin liquids  -    Recommended Diagnostics reassess via clinical swallow evaluation;other (see comments)  VFSS if indicated  -    Recommended Precautions and Strategies upright posture during/after eating;small bites of food and sips of liquid;alternate between small bites of food and sips of liquid;check mouth frequently for oral residue/pocketing;general aspiration precautions;fatigue precautions;1:1 supervision;assist with feeding  -    Oral Care Recommendations Oral Care BID/PRN  -    SLP Rec. for Method of Medication Administration meds whole;meds crushed;with thin liquids;with pudding or applesauce;as tolerated  -    Monitor for Signs of Aspiration yes;notify SLP if any concerns;cough;elevated WBC count;gurgly voice;throat clearing;fever;upper respiratory;pneumonia;right lower lobe infiltrates  -    Anticipated Discharge Disposition (SLP) skilled nursing facility  -    "            Swallow LTGs Swallow Long Term Goal (free text)  -SM    Swallow STGs diet tolerance goal selection (SLP)  -SM    Diet Tolerance Goal Selection (SLP) Swallow Short Term Goal 1;Swallow Short Term Goal 2  -SM               (LTG) Swallow The patient will maximize swallow function for least restrictive po diet, exhibiting no complications associated with dysphagia, adequate po intake, and demonstrating independent use of safe swallow compensations.  -SM    [place] (Swallow Long Term Goal) with moderate cues (50-74% accuracy)  -SM    Time Frame (Swallow Long Term Goal) by discharge  -SM               (STG) Swallow 1 The patient will participate in a full meal assessment to determine safety and adequacy of recommended diet, independent use of safe swallow compensations, and additional goals/recommendations to follow  -SM    [place] (Swallow Short Term Goal 1) with moderate cues (50-74% accuracy)  -SM    Time Frame (Swallow Short Term Goal 1) other (see comments)  24-48 hours  -SM               (STG) Swallow 2 The patient will participate in ongoing assessment of swallow to determine need for further swallow re-evaluation and appropriateness/need for an instrumental assessment of swallow  -SM    [place] (Swallow Short Term Goal 2) with moderate cues (50-74% accuracy)  -SM    Time Frame (Swallow Short Term Goal 2) 1 week  -SM          User Key  (r) = Recorded By, (t) = Taken By, (c) = Cosigned By    Initials Name Effective Dates    Raya Barrow, EUGENIA 06/16/21 -                 EDUCATION  The patient has been educated in the following areas:   Dysphagia (Swallowing Impairment) Modified Diet Instruction.        SLP GOALS     Row Name 09/12/22 1500          (STG) Swallow 1 The patient will participate in a full meal assessment to determine safety and adequacy of recommended diet, independent use of safe swallow compensations, and additional goals/recommendations to follow  -SM    [place]  (Swallow Short Term Goal 1) with moderate cues (50-74% accuracy)  -SM    Time Frame (Swallow Short Term Goal 1) other (see comments)  24-48 hours  -SM    (LTG) Swallow The patient will maximize swallow function for least restrictive po diet, exhibiting no complications associated with dysphagia, adequate po intake, and demonstrating independent use of safe swallow compensations.  -SM    [place] (Swallow Long Term Goal) with moderate cues (50-74% accuracy)  -SM    Time Frame (Swallow Long Term Goal) by discharge  -SM               (STG) Swallow 2 The patient will participate in ongoing assessment of swallow to determine need for further swallow re-evaluation and appropriateness/need for an instrumental assessment of swallow  -SM    [place] (Swallow Short Term Goal 2) with moderate cues (50-74% accuracy)  -SM    Time Frame (Swallow Short Term Goal 2) 1 week  -SM          User Key  (r) = Recorded By, (t) = Taken By, (c) = Cosigned By    Initials Name Provider Type    Raya Barrow, SLP Speech and Language Pathologist                   Time Calculation:                EUGENIA Navarro  9/12/2022

## 2022-09-12 NOTE — CASE MANAGEMENT/SOCIAL WORK
Continued Stay Note  [facility]     Patient Name: Jeremiah Henson  MRN: [id number]  Today's Date: 9/12/2022    Admit Date: 9/8/2022     Discharge Plan     Row Name 09/12/22 1542       Plan    Plan D/C Plan: Return  to [facility].    Plan Comments Barriers to discharge: IV antibiotics for UTI, voiding trial tomorrow 9/13.              Expected Discharge Date and Time     Expected Discharge Date Expected Discharge Time    Sep 14, 2022           Phone communication or documentation only - no physical contact with patient or family.  Sherron Hartmann RN      Office Phone [phone]  Office Cell [phone]

## 2022-09-12 NOTE — PLAN OF CARE
Goal Outcome Evaluation:  Plan of Care Reviewed With: patient        Progress: no change  Outcome Evaluation: Pt resting in bed. Pt confused. FC in place, planned voiding trial tomorrow. Abx on board for UTI. Will continue to monitor.

## 2022-09-12 NOTE — PROGRESS NOTES
[facility] Medicine Services Daily Progress Note    Patient Name: Jeremiah Henson  : 1950  MRN: [id number]  Primary Care Physician:  Anna Barkley MD  Date of admission: 2022      Subjective      Chief Complaint: Alteration in mentation    9/10/2022  Today the patient was more easily arousable.  She would smile and give 1 maybe 2 word answers.    2022  Today the patient was able to hold a conversation.  The patient was able to tell me how she felt, answer simple questions which is a major improvement over admission.    2022  Patient seen and examined  She is very much alert  Complains of generalized aches  Was seen by urologist who recommended voiding trial in the a.m.      Review of Systems   Constitutional: Negative for chills and fever.   HENT: Negative for congestion.    Cardiovascular: Negative for chest pain.   Respiratory: Negative for shortness of breath.    Endocrine: Negative for cold intolerance and heat intolerance.   Musculoskeletal: Positive for arthritis.   Gastrointestinal: Negative for nausea and vomiting.   Genitourinary: Negative for flank pain.   Neurological: Positive for weakness.            Objective      Vitals:   Temp:  [97.6 °F (36.4 °C)-97.7 °F (36.5 °C)] 97.7 °F (36.5 °C)  Heart Rate:  [72-87] 87  Resp:  [16-17] 16  BP: (137-158)/(75-80) 158/80    Physical Exam  Vitals reviewed.   Constitutional:       General: She is not in acute distress.     Appearance: She is well-developed.   HENT:      Head: Normocephalic.      Nose: Nose normal.      Mouth/Throat:      Mouth: Mucous membranes are moist.   Eyes:      Extraocular Movements: Extraocular movements intact.      Conjunctiva/sclera: Conjunctivae normal.      Pupils: Pupils are equal, round, and reactive to light.   Neck:      Thyroid: No thyromegaly.      Vascular: No JVD.      Trachea: No tracheal deviation.   Cardiovascular:      Rate and Rhythm: Normal rate. Rhythm irregular.    Pulmonary:      Effort: No respiratory distress.      Breath sounds: Normal breath sounds. No rales.   Abdominal:      General: Bowel sounds are normal.      Palpations: Abdomen is soft.      Tenderness: There is no abdominal tenderness.   Musculoskeletal:      Cervical back: Neck supple. No muscular tenderness.      Comments: Degenerative changes   Skin:     General: Skin is warm and dry.   Neurological:      Mental Status: She is alert. Mental status is at baseline.      Motor: No abnormal muscle tone.   Psychiatric:         Mood and Affect: Mood normal.           Result Review    Result Review:  I have personally reviewed the results from the time of this admission to 9/12/2022 16:25 EDT and agree with these findings:  [x]  Laboratory  [x]  Microbiology  [x]  Radiology  []  EKG/Telemetry   []  Cardiology/Vascular   []  Pathology  []  Old records  []  Other:  Most notable findings include: Elevated levels of Dilantin phenobarb and Keppra    Wounds (last 24 hours)     LDA Wound     Row Name 09/12/22 0846 09/11/22 2040          Wound 09/08/22 1820 sacral spine Pressure Injury    Wound - Properties Group Placement Date: 09/08/22  -ER Placement Time: 1820  -ER Present on Hospital Admission: Y  -ER Location: sacral spine  -ER Primary Wound Type: Pressure inj  -ER     Pressure Injury Stage 2  -RM 2  -AB     Closure Adhesive bandage  -RM Adhesive bandage  -AB     Base non-blanchable;red  -RM non-blanchable;red  -AB     Drainage Amount none  -RM none  -AB     Care, Wound barrier applied  -RM barrier applied  -AB     Dressing Care silicone  -RM silicone  -AB     Periwound Care barrier film applied  -RM --     Retired Wound - Properties Group Placement Date: 09/08/22  -ER Placement Time: 1820  -ER Present on Hospital Admission: Y  -ER Location: sacral spine  -ER Primary Wound Type: Pressure inj  -ER     Retired Wound - Properties Group Date first assessed: 09/08/22  -ER Time first assessed: 1820  -ER Present on Hospital  Admission: Y  -ER Location: sacral spine  -ER Primary Wound Type: Pressure inj  -ER            Wound 09/08/22 1821 Right posterior greater trochanter    Wound - Properties Group Placement Date: 09/08/22  -ER Placement Time: 1821  -ER Side: Right  -ER Orientation: posterior  -ER Location: greater trochanter  -ER     Closure Adhesive closure strips  -RM Adhesive closure strips  -AB     Base blanchable  -RM blanchable  -AB     Drainage Amount none  -RM none  -AB     Care, Wound cleansed with;soap and water  -RM --     Dressing Care silicone  -RM --     Retired Wound - Properties Group Placement Date: 09/08/22  -ER Placement Time: 1821  -ER Side: Right  -ER Orientation: posterior  -ER Location: greater trochanter  -ER     Retired Wound - Properties Group Date first assessed: 09/08/22  -ER Time first assessed: 1821  -ER Side: Right  -ER Location: greater trochanter  -ER           User Key  (r) = Recorded By, (t) = Taken By, (c) = Cosigned By    Initials Name Provider Type    ER Anh Cormier RN Registered Nurse    Julia Smith RN Registered Nurse    Patrizia Walsh LPN Licensed Nurse                  Assessment & Plan      Brief Patient Summary:    71-year-old female with multiple comorbidities including hypertension, atrial fibrillation on chronic anticoagulation with Eliquis and seizure disorder.  She is a resident of a local [facility].  Was sent to the emergency room due to altered mental status.      amLODIPine, 10 mg, Oral, Nightly  apixaban, 5 mg, Oral, Q12H  atorvastatin, 10 mg, Oral, Nightly  cefTRIAXone, 2 g, Intravenous, Q24H  Divalproex Sodium, 500 mg, Oral, Q12H  docusate sodium, 200 mg, Oral, BID  hydrALAZINE, 50 mg, Oral, TID  levETIRAcetam, 750 mg, Oral, BID  lidocaine, 1 patch, Transdermal, Q24H  metoprolol tartrate, 50 mg, Oral, Daily  phenytoin extended, 200 mg, Oral, BID  polyethylene glycol, 17 g, Oral, Daily  sertraline, 50 mg, Oral, Daily  sodium chloride, 10 mL, Intravenous, Q12H              Active Hospital Problems:  Active Hospital Problems    Diagnosis    • Altered mental status, unspecified altered mental status type      Plan:     Acute toxic metabolic encephalopathy  Multiple comorbidities-hypernatremia versus acute kidney injury versus UTI  Mental status back to baseline    Proteus mirabilis UTI  Pan susceptibility  On Rocephin    Staph coagulase-negative bacteremia  1 out of 2 bottles  Most likely contaminants    Acute kidney injury  Most likely prerenal  Resolved    Seizure disorder  Continue seizure precaution  On divalproex , Keppra and phenytoin  Monitor    Chronic atrial fibrillation  Rate controlled  On metoprolol and therapeutic dose Lovenox  Discontinue Lovenox and start on Eliquis    Hyperlipidemia-on statin    Urinary retention  Probably due to UTI/decreased mobility due to illness  Urologist following  Voiding trial in the a.m.    Hypertension  Chronic and essential  Blood pressure fairly controlled  On hydralazine, metoprolol and Norvasc     Hypernatremia  Probably due to dehydration  Improved    Depression-on Zoloft        DVT prophylaxis:  Medical DVT prophylaxis orders are present.    CODE STATUS:    Level Of Support Discussed With: Patient  Code Status (Patient has no pulse and is not breathing): CPR (Attempt to Resuscitate)  Medical Interventions (Patient has pulse or is breathing): Full Support      Disposition: Pending clinical progress    This patient has been examined wearing appropriate Personal Protective Equipment and discussed with hospital infection control department. 09/12/22      Electronically signed by Manuel Barreto MD, 09/12/22, 16:25 EDT.  [facility] Hospitalist Team

## 2022-09-12 NOTE — PROGRESS NOTES
Urology Progress Note    Patient Identification:  Name:  Jeremiah Henson  Age:  71 y.o.  Sex:  female  :  1950  MRN:  [id number]    Chief Complaint: Patient feels better    History of Present Illness: Urine culture is growing Proteus which is sensitive to the Rocephin she is on.  Serum creatinine is 0.52 with a white blood cell count of 3.5.  Urine has cleared nicely.    Problem List:  Patient Active Problem List   Diagnosis   • Atrial fibrillation (HCC)   • Cerebral infarction (HCC)   • Family history of lung cancer   • Hemiplegia of dominant side (HCC)   • Hyperlipidemia   • Hypertensive disorder   • Seizure disorder (HCC)   • Sleep apnea   • Abscess of back   • Chronic pain disorder   • Closed fracture of right hand   • Seasonal allergic rhinitis   • Urinary tract infectious disease   • Vitamin D deficiency   • Seizure disorder (HCC)   • Abdominal pain   • Anxiety   • Constipation   • Depressive disorder   • Mood disorder (HCC)   • Dislocation of shoulder joint   • Mechanical ileus (HCC)   • Left lower quadrant abdominal pain   • Altered mental status, unspecified altered mental status type     Past Medical History:  Past Medical History:   Diagnosis Date   • CVA (cerebral vascular accident) (HCC)    • Hyperlipidemia    • Hypertension    • Seizure (HCC)    • Sleep apnea      Past Surgical History:  Past Surgical History:   Procedure Laterality Date   • COLONOSCOPY N/A 2020    Procedure: COLONOSCOPY WITH POLYPECTOMY X 2;  Surgeon: Abelardo Garay MD;  Location: [facility] ENDOSCOPY;  Service: Gastroenterology;  Laterality: N/A;  COLON POLYPS   • HIP SURGERY     • PACEMAKER IMPLANTATION       Home Meds:  Medications Prior to Admission   Medication Sig Dispense Refill Last Dose   • acetaminophen (TYLENOL) 325 MG tablet Take 650 mg by mouth Every 8 (Eight) Hours As Needed for Mild Pain , Moderate Pain  or Fever.      • acetaminophen (Tylenol) 325 MG tablet Take 650 mg by mouth 2 (Two) Times a Day.       • amLODIPine (NORVASC) 10 MG tablet Take 10 mg by mouth Every Night.      • atorvastatin (LIPITOR) 10 MG tablet Take 10 mg by mouth Every Night.      • baclofen (LIORESAL) 10 MG tablet Take 15 mg by mouth 3 (Three) Times a Day.      • bisacodyl (DULCOLAX) 10 MG suppository Insert 10 mg into the rectum Daily As Needed.      • busPIRone (BUSPAR) 5 MG tablet Take 5 mg by mouth 2 (Two) Times a Day.      • Cholecalciferol (Vitamin D3) 50 MCG (2000 UT) tablet Take  by mouth Daily.      • Diclofenac Sodium (VOLTAREN) 1 % gel gel 2 (Two) Times a Day. Right Knee/ Right Shoulder      • digoxin (LANOXIN) 250 MCG tablet Take 250 mcg by mouth Daily. Hold For Pulse <60      • divalproex (DEPAKOTE) 125 MG DR tablet Take 250 mg by mouth 3 (Three) Times a Day.      • docusate sodium (COLACE) 100 MG capsule Take 200 mg by mouth 2 (Two) Times a Day.      • Eliquis 5 MG tablet tablet Take 5 mg by mouth 2 (Two) Times a Day.      • fenofibrate (TRICOR) 48 MG tablet Take 48 mg by mouth Daily.      • hydrALAZINE (APRESOLINE) 50 MG tablet Take 50 mg by mouth 3 (Three) Times a Day.      • HYDROcodone-acetaminophen (NORCO) 5-325 MG per tablet Take 1 tablet by mouth 2 (Two) Times a Day.      • levETIRAcetam (KEPPRA) 500 MG tablet Take 500 mg by mouth 2 (Two) Times a Day.      • Lidocaine 4 % patch Apply  topically Daily. Right Shoulder      • lisinopril (PRINIVIL,ZESTRIL) 40 MG tablet Take 40 mg by mouth Daily.      • metoprolol tartrate (LOPRESSOR) 100 MG tablet Take 100 mg by mouth Daily.      • Omega-3 1000 MG capsule Take 1 capsule by mouth 2 (two) times a day.      • phenytoin extended (DILANTIN) 200 MG ER capsule Take 200 mg by mouth 2 (Two) Times a Day.      • polyethylene glycol (MIRALAX) 17 g packet Take 17 g by mouth Daily.      • senna (senna) 8.6 MG tablet Take 2 tablets by mouth 2 (Two) Times a Day.      • sertraline (ZOLOFT) 50 MG tablet Take 50 mg by mouth Daily.        Current Meds:    Current Facility-Administered  Medications:   •  acetaminophen (TYLENOL) tablet 650 mg, 650 mg, Oral, Q4H PRN, 650 mg at 09/10/22 2021 **OR** acetaminophen (TYLENOL) 160 MG/5ML solution 650 mg, 650 mg, Oral, Q4H PRN **OR** acetaminophen (TYLENOL) suppository 650 mg, 650 mg, Rectal, Q4H PRN, Valarie Hermosillo MD  •  amLODIPine (NORVASC) tablet 10 mg, 10 mg, Oral, Nightly, Valarie Hermosillo MD, 10 mg at 09/11/22 2111  •  atorvastatin (LIPITOR) tablet 10 mg, 10 mg, Oral, Nightly, Valarie Hermosillo MD, 10 mg at 09/11/22 2110  •  bisacodyl (DULCOLAX) suppository 10 mg, 10 mg, Rectal, Daily PRN, Valarie Hermosillo MD  •  cefTRIAXone (ROCEPHIN) 2 g in sodium chloride 0.9 % 100 mL IVPB, 2 g, Intravenous, Q24H, Valarie Hermosillo MD  •  Divalproex Sodium (DEPAKOTE SPRINKLE) capsule 500 mg, 500 mg, Oral, Q12H, Lorena Bocanegra APRN, 500 mg at 09/11/22 2110  •  docusate sodium (COLACE) capsule 200 mg, 200 mg, Oral, BID, Valarie Hermosillo MD, 200 mg at 09/11/22 0835  •  Enoxaparin Sodium (LOVENOX) syringe 70 mg, 70 mg, Subcutaneous, Q12H, Valarie Hermosillo MD, 70 mg at 09/12/22 0232  •  hydrALAZINE (APRESOLINE) injection 10 mg, 10 mg, Intravenous, Q6H PRN, Valarie Hermosillo MD, 10 mg at 09/10/22 1025  •  hydrALAZINE (APRESOLINE) tablet 50 mg, 50 mg, Oral, TID, Valarie Hermosillo MD, 50 mg at 09/11/22 2110  •  levETIRAcetam (KEPPRA) tablet 750 mg, 750 mg, Oral, BID, Lorena Bocanegra APRN, 750 mg at 09/11/22 2110  •  lidocaine (LIDODERM) 5 % 1 patch, 1 patch, Transdermal, Q24H, Valarie Hermosillo MD  •  metoprolol tartrate (LOPRESSOR) tablet 50 mg, 50 mg, Oral, Daily, Valarie Hermosillo MD, 50 mg at 09/11/22 0835  •  ondansetron (ZOFRAN) tablet 4 mg, 4 mg, Oral, Q6H PRN **OR** ondansetron (ZOFRAN) injection 4 mg, 4 mg, Intravenous, Q6H PRN, Valarie Hermosillo MD  •  phenytoin ER (DILANTIN) capsule 200 mg, 200 mg, Oral, BID, Valarie Hermosillo MD, 200 mg at 09/11/22 2111  •  polyethylene glycol (MIRALAX) packet 17 g, 17 g, Oral, Daily, Valarie Hermsoillo MD, 17 g at 09/11/22 0840  •  sertraline  "(ZOLOFT) tablet 50 mg, 50 mg, Oral, Daily, Valarie Hermosillo MD, 50 mg at 22 0840  •  sodium chloride 0.9 % flush 10 mL, 10 mL, Intravenous, PRN, Valarie Hermosillo MD, 10 mL at 22 1323  •  sodium chloride 0.9 % flush 10 mL, 10 mL, Intravenous, Q12H, Valarie Hermosillo MD, 10 mL at 22 2110  •  sodium chloride 0.9 % flush 10 mL, 10 mL, Intravenous, PRN, Valarie Hermosillo MD  Allergies:  Patient has no known allergies.    Review of Systems no fevers or chills.  No congestion or nasal discharge.    Objective:  tMax 24 hours:  Temp (24hrs), Av.3 °F (36.3 °C), Min:96.6 °F (35.9 °C), Max:97.7 °F (36.5 °C)    Vital Sign Ranges:  Temp:  [96.6 °F (35.9 °C)-97.7 °F (36.5 °C)] 97.6 °F (36.4 °C)  Heart Rate:  [69-85] 85  Resp:  [16-18] 16  BP: (137-159)/(75-91) 155/75  Intake and Output Last 3 Shifts:  I/O last 3 completed shifts:  In: 1200 [P.O.:200; I.V.:1000]  Out: 1450 [Urine:1450]    Exam:  /75 (BP Location: Left arm, Patient Position: Lying)   Pulse 85   Temp 97.6 °F (36.4 °C) (Oral)   Resp 16   Ht 165.1 cm (65\")   Wt 72.7 kg (160 lb 4.4 oz)   SpO2 99%   BMI 26.67 kg/m²    General Appearance:    Alert, cooperative, no acute distress, general         appearance is normal   Head:    Normocephalic, without obvious abnormality, atraumatic   Eyes:            Pupils/Irises normal. Exterior inspection conjunctivae       and lids normal.   Ears:    Normal external inspection   Nose:   Exterior inspection of nose is normal   Throat:   Lips, mucosa, and tongue normal   Lungs:     Respirations unlabored; normal effort, no audible     abnormality   CV:   Regular rhythm and normal rate, no edema   Abdomen:     examination of the abdomen is normal with     no masses, tenderness, or distension    :  Abbott with yellow urine     Data Review:  All labs (24hrs):    Lab Results (last 24 hours)     Procedure Component Value Units Date/Time    Basic Metabolic Panel [[id number]]  (Abnormal) Collected: 22 0254    " Specimen: Blood Updated: 09/12/22 0322     Glucose 110 mg/dL      BUN 24 mg/dL      Creatinine 0.52 mg/dL      Sodium 139 mmol/L      Potassium 3.6 mmol/L      Chloride 101 mmol/L      CO2 29.0 mmol/L      Calcium 8.0 mg/dL      BUN/Creatinine Ratio 46.2     Anion Gap 9.0 mmol/L      eGFR 98.2 mL/min/1.73      Comment: National Kidney Foundation and American Society of Nephrology (ASN) Task Force recommended calculation based on the Chronic Kidney Disease Epidemiology Collaboration (CKD-EPI) equation refit without adjustment for race.       Narrative:      GFR Normal >60  Chronic Kidney Disease <60  Kidney Failure <15      Ammonia [[id number]]  (Normal) Collected: 09/12/22 0254    Specimen: Blood Updated: 09/12/22 0319     Ammonia 35 umol/L     CBC & Differential [[id number]]  (Abnormal) Collected: 09/12/22 0254    Specimen: Blood Updated: 09/12/22 0304    Narrative:      The following orders were created for panel order CBC & Differential.  Procedure                               Abnormality         Status                     ---------                               -----------         ------                     CBC Auto Differential[[id number]]        Abnormal            Final result                 Please view results for these tests on the individual orders.    CBC Auto Differential [[id number]]  (Abnormal) Collected: 09/12/22 0254    Specimen: Blood Updated: 09/12/22 0304     WBC 3.50 10*3/mm3      RBC 3.29 10*6/mm3      Hemoglobin 10.8 g/dL      Hematocrit 32.0 %      MCV 97.3 fL      MCH 32.9 pg      MCHC 33.8 g/dL      RDW 14.0 %      RDW-SD 48.1 fl      MPV 8.0 fL      Platelets 156 10*3/mm3      Neutrophil % 47.2 %      Lymphocyte % 41.3 %      Monocyte % 9.9 %      Eosinophil % 0.7 %      Basophil % 0.9 %      Neutrophils, Absolute 1.60 10*3/mm3      Lymphocytes, Absolute 1.40 10*3/mm3      Monocytes, Absolute 0.30 10*3/mm3      Eosinophils, Absolute 0.00 10*3/mm3      Basophils, Absolute 0.00 10*3/mm3       nRBC 0.1 /100 WBC     Vitamin B12 [[id number]]  (Abnormal) Collected: 09/11/22 0030    Specimen: Blood from Arm, Left Updated: 09/11/22 1810     Vitamin B-12 948 pg/mL     Narrative:      Results may be falsely increased if patient taking Biotin.      TSH [[id number]]  (Normal) Collected: 09/11/22 0030    Specimen: Blood from Arm, Left Updated: 09/11/22 1404     TSH 1.110 uIU/mL     Valproic Acid Level, Total [[id number]]  (Abnormal) Collected: 09/11/22 0030    Specimen: Blood from Arm, Left Updated: 09/11/22 1354     Valproic Acid 20.7 mcg/mL     Narrative:      Therapeutic Ranges for Valproic Acid    Epilepsy:       mcg/ml  Bipolar/Meli  up to 125 mcg/ml      Lipid Panel [[id number]]  (Abnormal) Collected: 09/11/22 0030    Specimen: Blood from Arm, Left Updated: 09/11/22 1354     Total Cholesterol 174 mg/dL      Triglycerides 171 mg/dL      HDL Cholesterol 53 mg/dL      LDL Cholesterol  92 mg/dL      VLDL Cholesterol 29 mg/dL      LDL/HDL Ratio 1.64    Narrative:      Cholesterol Reference Ranges  (U.S. Department of Health and Human Services ATP III Classifications)    Desirable          <200 mg/dL  Borderline High    200-239 mg/dL  High Risk          >240 mg/dL      Triglyceride Reference Ranges  (U.S. Department of Health and Human Services ATP III Classifications)    Normal           <150 mg/dL  Borderline High  150-199 mg/dL  High             200-499 mg/dL  Very High        >500 mg/dL    HDL Reference Ranges  (U.S. Department of Health and Human Services ATP III Classifications)    Low     <40 mg/dl (major risk factor for CHD)  High    >60 mg/dl ('negative' risk factor for CHD)        LDL Reference Ranges  (U.S. Department of Health and Human Services ATP III Classifications)    Optimal          <100 mg/dL  Near Optimal     100-129 mg/dL  Borderline High  130-159 mg/dL  High             160-189 mg/dL  Very High        >189 mg/dL    Blood Culture - Blood, Hand, Left [[id number]]  (Normal) Collected: 09/08/22  1318    Specimen: Blood from Hand, Left Updated: 09/11/22 1345     Blood Culture No growth at 3 days    Urine Culture - Urine, Urine, Catheter [[id number]]  (Abnormal)  (Susceptibility) Collected: 09/08/22 1203    Specimen: Urine, Catheter Updated: 09/11/22 1342     Urine Culture >100,000 CFU/mL Proteus mirabilis    Narrative:      Colonization of the urinary tract without infection is common. Treatment is discouraged unless the patient is symptomatic, pregnant, or undergoing an invasive urologic procedure.    Susceptibility      Proteus mirabilis      VICTOR MANUEL      Ampicillin Susceptible      Ampicillin + Sulbactam Susceptible      Cefazolin Susceptible      Cefepime Susceptible      Ceftazidime Susceptible      Ceftriaxone Susceptible      Gentamicin Susceptible      Levofloxacin Susceptible      Nitrofurantoin Resistant     Piperacillin + Tazobactam Susceptible      Trimethoprim + Sulfamethoxazole Resistant                   Linear View                   Phenytoin Level, Total [[id number]]  (Normal) Collected: 09/11/22 0030    Specimen: Blood from Arm, Left Updated: 09/11/22 1206     Phenytoin Level 13.0 mcg/mL     Blood Culture - Blood, Hand, Left [[id number]]  (Normal) Collected: 09/10/22 1035    Specimen: Blood from Hand, Left Updated: 09/11/22 1103     Blood Culture No growth at 24 hours        Radiology:   Imaging Results (Last 72 Hours)     ** No results found for the last 72 hours. **          Assessment/Plan:    Active Problems:    Altered mental status, unspecified altered mental status type     Proteus UTI for which she is on the correct antibiotic    Plan  Voiding trial tomorrow morning      Martin aBrboza MD  9/12/2022  07:57 EDT

## 2022-09-12 NOTE — PLAN OF CARE
Goal Outcome Evaluation:  Plan of Care Reviewed With: patient        Progress: no change   Pt resting throughout night. Less sediment and mucus in catheter bag, irrigating around every 3 hours. Pt on room air, call light in reach. Will continue to monitor..

## 2022-09-12 NOTE — PLAN OF CARE
"Goal Outcome Evaluation:   Clinical swallow evaluation completed this date as per staff request as she is currently receiving a clear liquid diet. Per nurse report, she contacted [facility] and was informed that the patient was receiving a regular consistency, thin liquid diet prior to admission. Head of bed was elevated to approximately 90 degrees by clinician. Unable to obtain history by patient due to confusion. She presented with a \"yes\" bias to simple yes/no questions asked. She was assessed with thin liquid via straw (x 2), puree (applesauce) x 4, mech soft (peaches x 3 trials), soft solid (Fig Ravi) x 2 attempts and regular solid (cracker) x 1 trial. Labial seal on spoon and straw are adequate. She is able to pull thin liquids via straw effectively. Adequate clearance of the spoon noted. Slow mastication noted with Fig Ravi and Peaches. Slight, anterior \"munch\" type chewing noted, however is functional. No anterior labial spillage or oral residue noted. She was unable to bite the cracker and took a large piece whole. Increased mastication time noted with patient requiring a liquid assist to aid in clearing oral cavity following this consistency. No overt pharyngeal findings audible. Vocal quality is clear with no \"wet\" quality noted. No cough or throat clearing present at any time.    RECOMMENDATIONS: ST recommends that the patient's diet be upgraded to mechanical soft/thin liquids at this time. She should be up at a full 90 degree angle for all meals/medications. ST will follow up with a full meal assessment and re-evaluation for possible further diet upgrade as indicated. Discussed with nurse and she was in agreement with plan/recommendations.               "

## 2022-09-13 VITALS
OXYGEN SATURATION: 99 % | DIASTOLIC BLOOD PRESSURE: 77 MMHG | BODY MASS INDEX: 30.49 KG/M2 | TEMPERATURE: 97.7 F | HEIGHT: 65 IN | RESPIRATION RATE: 20 BRPM | SYSTOLIC BLOOD PRESSURE: 163 MMHG | HEART RATE: 107 BPM | WEIGHT: 182.98 LBS

## 2022-09-13 LAB
ANION GAP SERPL CALCULATED.3IONS-SCNC: 11 MMOL/L (ref 5–15)
BACTERIA SPEC AEROBE CULT: NORMAL
BASOPHILS # BLD AUTO: 0 10*3/MM3 (ref 0–0.2)
BASOPHILS NFR BLD AUTO: 0.3 % (ref 0–1.5)
BUN SERPL-MCNC: 15 MG/DL (ref 8–23)
BUN/CREAT SERPL: 34.1 (ref 7–25)
CALCIUM SPEC-SCNC: 8 MG/DL (ref 8.6–10.5)
CHLORIDE SERPL-SCNC: 99 MMOL/L (ref 98–107)
CO2 SERPL-SCNC: 27 MMOL/L (ref 22–29)
CREAT SERPL-MCNC: 0.44 MG/DL (ref 0.57–1)
DEPRECATED RDW RBC AUTO: 47.3 FL (ref 37–54)
EGFRCR SERPLBLD CKD-EPI 2021: 102.3 ML/MIN/1.73
EOSINOPHIL # BLD AUTO: 0 10*3/MM3 (ref 0–0.4)
EOSINOPHIL NFR BLD AUTO: 0.6 % (ref 0.3–6.2)
ERYTHROCYTE [DISTWIDTH] IN BLOOD BY AUTOMATED COUNT: 14 % (ref 12.3–15.4)
GLUCOSE SERPL-MCNC: 106 MG/DL (ref 65–99)
HCT VFR BLD AUTO: 31.7 % (ref 34–46.6)
HGB BLD-MCNC: 10.8 G/DL (ref 12–15.9)
LYMPHOCYTES # BLD AUTO: 1.1 10*3/MM3 (ref 0.7–3.1)
LYMPHOCYTES NFR BLD AUTO: 30.6 % (ref 19.6–45.3)
MCH RBC QN AUTO: 32.7 PG (ref 26.6–33)
MCHC RBC AUTO-ENTMCNC: 34.1 G/DL (ref 31.5–35.7)
MCV RBC AUTO: 96.1 FL (ref 79–97)
MONOCYTES # BLD AUTO: 0.4 10*3/MM3 (ref 0.1–0.9)
MONOCYTES NFR BLD AUTO: 11 % (ref 5–12)
NEUTROPHILS NFR BLD AUTO: 2.1 10*3/MM3 (ref 1.7–7)
NEUTROPHILS NFR BLD AUTO: 57.5 % (ref 42.7–76)
NRBC BLD AUTO-RTO: 0.2 /100 WBC (ref 0–0.2)
PLATELET # BLD AUTO: 159 10*3/MM3 (ref 140–450)
PMV BLD AUTO: 9 FL (ref 6–12)
POTASSIUM SERPL-SCNC: 3.6 MMOL/L (ref 3.5–5.2)
RBC # BLD AUTO: 3.3 10*6/MM3 (ref 3.77–5.28)
SODIUM SERPL-SCNC: 137 MMOL/L (ref 136–145)
WBC NRBC COR # BLD: 3.7 10*3/MM3 (ref 3.4–10.8)

## 2022-09-13 PROCEDURE — 25010000002 HYDRALAZINE PER 20 MG: Performed by: INTERNAL MEDICINE

## 2022-09-13 PROCEDURE — 92526 ORAL FUNCTION THERAPY: CPT

## 2022-09-13 PROCEDURE — 25010000002 CEFTRIAXONE PER 250 MG: Performed by: INTERNAL MEDICINE

## 2022-09-13 PROCEDURE — 99239 HOSP IP/OBS DSCHRG MGMT >30: CPT | Performed by: INTERNAL MEDICINE

## 2022-09-13 RX ORDER — CEFDINIR 300 MG/1
300 CAPSULE ORAL 2 TIMES DAILY
Qty: 6 CAPSULE | Refills: 0 | Status: SHIPPED | OUTPATIENT
Start: 2022-09-13 | End: 2022-09-16

## 2022-09-13 RX ADMIN — DOCUSATE SODIUM 200 MG: 100 CAPSULE, LIQUID FILLED ORAL at 07:57

## 2022-09-13 RX ADMIN — LEVETIRACETAM 750 MG: 500 TABLET, FILM COATED ORAL at 07:57

## 2022-09-13 RX ADMIN — Medication 10 ML: at 07:57

## 2022-09-13 RX ADMIN — HYDRALAZINE HYDROCHLORIDE 50 MG: 25 TABLET, FILM COATED ORAL at 07:57

## 2022-09-13 RX ADMIN — PHENYTOIN SODIUM 200 MG: 100 CAPSULE ORAL at 07:56

## 2022-09-13 RX ADMIN — HYDRALAZINE HYDROCHLORIDE 10 MG: 20 INJECTION INTRAMUSCULAR; INTRAVENOUS at 06:24

## 2022-09-13 RX ADMIN — APIXABAN 5 MG: 5 TABLET, FILM COATED ORAL at 07:57

## 2022-09-13 RX ADMIN — DIVALPROEX SODIUM 500 MG: 125 CAPSULE, COATED PELLETS ORAL at 07:57

## 2022-09-13 RX ADMIN — METOPROLOL TARTRATE 50 MG: 50 TABLET, FILM COATED ORAL at 07:57

## 2022-09-13 RX ADMIN — SERTRALINE HYDROCHLORIDE 50 MG: 50 TABLET, FILM COATED ORAL at 07:57

## 2022-09-13 NOTE — PAYOR COMM NOTE
"This is discharge notification for Taqueria Henson   Reference/Auth # [id number]  Pt discharged to long term care facility on 9/13/22.    Temitope Merritt RN, BSN  Utilization Review Nurse  [facility]  Direct & confidential phone # [phone]  Fax # [phone]        Taqueria Henson (71 y.o. Female)             Date of Birth   1950    Social Security Number       Address   [address] [place] [address]    Home Phone   [phone]    MRN   [id number]       Taoist   None    Marital Status                               Admission Date   9/8/22    Admission Type   Emergency    Admitting Provider   Valarie Hermosillo MD    Attending Provider       [facility]       Discharge Date   9/13/2022    Discharge Disposition   Long Term Care (DC - External)    Discharge Destination                               Attending Provider: (none)   Allergies: No Known Allergies    Isolation: None   Infection: None   Code Status: CPR   Advance Care Planning Activity    Ht: 165.1 cm (65\")   Wt: 83 kg (182 lb 15.7 oz)    Admission Cmt: None   Principal Problem: None                Active Insurance as of 9/8/2022     Primary Coverage     Payor Plan Insurance Group Employer/Plan Group    MISC MEDICARE REPLACEMENT MISC MCARE REPLACEMENT [id number]     Coverage Address Coverage Phone Number Coverage Fax Number Effective Dates    [address] [phone]  1/1/2022 - None Entered    [address]       [facility] [address]       Subscriber Name Subscriber Birth Date Member ID       TAQUERIA HENSON 1950 [id number]           Secondary Coverage     Payor Plan Insurance Group Employer/Plan Group    [place]      Payor Plan Address Payor Plan Phone Number Payor Plan Fax Number Effective Dates    [address]   7/21/2019 - None Entered    [place] [address]       Subscriber Name Subscriber Birth Date " Member ID       TAQUERIA HENSON 1950 [id number]                 Emergency Contacts      (Rel.) Home Phone Work Phone Mobile Phone    PRAVEEN PARSONS (Daughter) -- -- [phone]               Discharge Summary      Manuel Barreto MD at 22 0746                       [facility]  DISCHARGE SUMMARY    Patient Name: Taqueria Henson  : 1950  MRN: [id number]    Date of Admission: 2022  Discharge Diagnosis:     Acute toxic metabolic encephalopathy  Multiple comorbidities-hypernatremia versus acute kidney injury versus UTI  Mental status back to baseline     Proteus mirabilis UTI  Pan susceptibility  On Rocephin while inpatient  Discharging on Omnicef to complete antibiotics course       Staph coagulase-negative bacteremia  1 out of 2 bottles  Most likely contaminants     Acute kidney injury  Most likely prerenal  Resolved     Seizure disorder  Continue seizure precaution  On divalproex , Keppra and phenytoin  Monitor     Chronic atrial fibrillation  Rate controlled  On metoprolol and on Eliquis     Hyperlipidemia-on statin     Urinary retention  Probably due to UTI/decreased mobility due to illness  Urologist following  Voiding trial done on 2022  If unsuccessful, Babott catheter will be placed and patient discharged to follow-up with  urologist as outpatient     Hypertension  Chronic and essential  Blood pressure fairly controlled  On hydralazine, metoprolol and Norvasc     Hypernatremia  Probably due to dehydration  Improved     Depression-on Zoloft          Date of Discharge: 2022  Primary Care Physician: Anna Barkley MD      Presenting Problem:   MEGAN (acute kidney injury) (HCC) [N17.9]  Acute cystitis with hematuria [N30.01]  Elevated digoxin level [R78.89]  On valproic acid therapy [Z79.899]  Altered mental status, unspecified altered mental status type [R41.82]  Elevated phenytoin level [R78.89]    Active and Resolved  Hospital Problems:  Active Hospital Problems    Diagnosis POA   • Altered mental status, unspecified altered mental status type [R41.82] Yes      Resolved Hospital Problems   No resolved problems to display.         Hospital Course     Hospital Course:  71-year-old female with multiple comorbidities including hypertension, atrial fibrillation on chronic anticoagulation with Eliquis and seizure disorder.  She is a resident of a local [facility].  Was sent to the emergency room due to altered mental status.     Conditions addressed while inpatient are as stated below    Acute toxic metabolic encephalopathy  Multiple comorbidities-hypernatremia versus acute kidney injury versus UTI  Mental status back to baseline     Proteus mirabilis UTI  Pan susceptibility  On Rocephin while inpatient  Discharging on Omnicef to complete antibiotics course       Staph coagulase-negative bacteremia  1 out of 2 bottles  Most likely contaminants     Acute kidney injury  Most likely prerenal  Resolved     Seizure disorder  Continue seizure precaution  On divalproex , Keppra and phenytoin  Monitor     Chronic atrial fibrillation  Rate controlled  On metoprolol and on Eliquis     Hyperlipidemia-on statin     Urinary retention  Probably due to UTI/decreased mobility due to illness  Urologist following  Voiding trial done on 9/13/2022  If unsuccessful, Abbott catheter will be placed and patient discharged to follow-up with  urologist as outpatient     Hypertension  Chronic and essential  Blood pressure fairly controlled  On hydralazine, metoprolol and Norvasc     Hypernatremia  Probably due to dehydration  Improved     Depression-on Zoloft      Condition at discharge-stable    DISCHARGE Follow Up Recommendations for labs and diagnostics:       Reasons For Change In Medications and Indications for New Medications:      Day of Discharge     Vital Signs:  Temp:  [97.4 °F (36.3 °C)-97.7 °F (36.5 °C)] 97.7 °F (36.5 °C)  Heart Rate:  [] 107  Resp:   [18-20] 20  BP: (157-199)/(69-80) 163/77    Physical Exam:  Physical Exam  Vitals reviewed.   Constitutional:       General: She is not in acute distress.  HENT:      Head: Normocephalic.      Nose: Nose normal.      Mouth/Throat:      Mouth: Mucous membranes are moist.   Eyes:      Extraocular Movements: Extraocular movements intact.      Conjunctiva/sclera: Conjunctivae normal.      Pupils: Pupils are equal, round, and reactive to light.   Cardiovascular:      Rate and Rhythm: Normal rate. Rhythm irregular.   Pulmonary:      Effort: No respiratory distress.   Abdominal:      General: Bowel sounds are normal. There is no distension.      Palpations: Abdomen is soft.      Tenderness: There is no abdominal tenderness.   Musculoskeletal:      Cervical back: Neck supple.      Comments: Degenerative changes   Skin:     General: Skin is warm.   Neurological:      Mental Status: She is alert. Mental status is at baseline.   Psychiatric:         Mood and Affect: Mood normal.            Pertinent  and/or Most Recent Results     LAB RESULTS:      Lab 09/12/22  2329 09/12/22  0254 09/10/22  2257 09/10/22  1035 09/09/22 2016 09/09/22  0518 09/08/22  1307 09/08/22  1200   WBC 3.70 3.50 3.10* 3.60  --  3.80  --  3.60   HEMOGLOBIN 10.8* 10.8* 11.0* 11.1*  --  11.5*  --  11.8*   HEMATOCRIT 31.7* 32.0* 33.2* 33.1*  --  34.5  --  35.8   PLATELETS 159 156 190 206  --  254  --  268   NEUTROS ABS 2.10 1.60* 1.60* 1.60*  --  2.10  --  1.70   LYMPHS ABS 1.10 1.40 1.10 1.50  --  1.30  --  1.50   MONOS ABS 0.40 0.30 0.30 0.50  --  0.40  --  0.40   EOS ABS 0.00 0.00 0.00 0.00  --  0.00  --  0.00   MCV 96.1 97.3* 97.3* 98.3*  --  98.8*  --  101.2*   PROCALCITONIN  --   --   --   --   --   --   --  0.12   LACTATE  --   --   --  0.9 2.5*  --  0.8  --          Lab 09/12/22  2329 09/12/22  0254 09/11/22  0030 09/09/22  0518 09/08/22  1200   SODIUM 137 139 140 147* 146*   POTASSIUM 3.6 3.6 3.7 3.9 4.5   CHLORIDE 99 101 103 107 104   CO2 27.0  29.0 28.0 23.0 24.0   ANION GAP 11.0 9.0 9.0 17.0* 18.0*   BUN 15 24* 31* 51* 57*   CREATININE 0.44* 0.52* 0.66 1.14* 1.48*   EGFR 102.3 98.2 92.8 50.9* 37.2*   GLUCOSE 106* 110* 129* 142* 114*   CALCIUM 8.0* 8.0* 8.5* 9.0 9.5   TSH  --   --  1.110  --   --          Lab 09/08/22  1200   TOTAL PROTEIN 7.0   ALBUMIN 3.80   GLOBULIN 3.2   ALT (SGPT) 8   AST (SGOT) 14   BILIRUBIN 0.2   ALK PHOS 36*             Lab 09/11/22  0030   CHOLESTEROL 174   LDL CHOL 92   HDL CHOL 53   TRIGLYCERIDES 171*         Lab 09/11/22  0030   VITAMIN B 12 948*         Brief Urine Lab Results  (Last result in the past 365 days)      Color   Clarity   Blood   Leuk Est   Nitrite   Protein   CREAT   Urine HCG        09/08/22 1203 Dark Yellow   Turbid   Small (1+)   Large (3+)   Negative   >=300 mg/dL (3+)               Microbiology Results (last 10 days)     Procedure Component Value - Date/Time    Blood Culture - Blood, Hand, Left [[id number]]  (Normal) Collected: 09/10/22 1035    Lab Status: Preliminary result Specimen: Blood from Hand, Left Updated: 09/13/22 1102     Blood Culture No growth at 3 days    Blood Culture - Blood, Arm, Right [[id number]]  (Abnormal) Collected: 09/08/22 1319    Lab Status: Final result Specimen: Blood from Arm, Right Updated: 09/10/22 0804     Blood Culture Staphylococcus, coagulase negative     Isolated from Aerobic Bottle     Gram Stain Aerobic Bottle Gram positive cocci in clusters    Narrative:      Probable contaminant requires clinical correlation, susceptibility not performed unless requested by physician.        Blood Culture ID, PCR - Blood, Arm, Right [[id number]]  (Abnormal) Collected: 09/08/22 1319    Lab Status: Final result Specimen: Blood from Arm, Right Updated: 09/09/22 1422     BCID, PCR Staph spp, not aureus or lugdunesis. Identification by BCID2 PCR.     BOTTLE TYPE Aerobic Bottle    Blood Culture - Blood, Hand, Left [[id number]]  (Normal) Collected: 09/08/22 1318    Lab Status: Preliminary result  Specimen: Blood from Hand, Left Updated: 09/12/22 1347     Blood Culture No growth at 4 days    Urine Culture - Urine, Urine, Catheter [[id number]]  (Abnormal)  (Susceptibility) Collected: 09/08/22 1203    Lab Status: Final result Specimen: Urine, Catheter Updated: 09/11/22 1342     Urine Culture >100,000 CFU/mL Proteus mirabilis    Narrative:      Colonization of the urinary tract without infection is common. Treatment is discouraged unless the patient is symptomatic, pregnant, or undergoing an invasive urologic procedure.    Susceptibility      Proteus mirabilis      VICTOR MANUEL      Ampicillin Susceptible      Ampicillin + Sulbactam Susceptible      Cefazolin Susceptible      Cefepime Susceptible      Ceftazidime Susceptible      Ceftriaxone Susceptible      Gentamicin Susceptible      Levofloxacin Susceptible      Nitrofurantoin Resistant     Piperacillin + Tazobactam Susceptible      Trimethoprim + Sulfamethoxazole Resistant                                CT Head Without Contrast    Result Date: 9/8/2022  Impression: No evidence of hemorrhage, mass effect or midline shift. No acute process identified. Stable chronic findings as above.  Electronically Signed By-Huong Dwyer MD On:9/8/2022 2:06 PM This report was finalized on [id number] by  Huong Dwyer MD.    XR Chest 1 View    Result Date: 9/8/2022  Impression:  1. No acute cardiopulmonary findings.  2. Anterior inferior subluxation or dislocation of the right shoulder. This has a similar appearance to the chest x-ray from 10/5/2017. Correlate with physical exam findings. 3. Stable borderline cardiac enlargement. 4. Old right rib fractures.  Electronically Signed By-Taty Mack MD On:9/8/2022 12:55 PM This report was finalized on [id number] by  Taty Mack MD.                  Labs Pending at Discharge:  Pending Labs     Order Current Status    Levetiracetam Level (Keppra) In process    Blood Culture - Blood, Hand, Left Preliminary result    Blood Culture  - Blood, Hand, Left Preliminary result          Procedures Performed           Consults:   Consults     Date and Time Order Name Status Description    9/11/2022  4:45 AM Inpatient Urology Consult Completed     9/10/2022  2:59 PM Inpatient Neurology Consult General Completed     9/8/2022  2:18 PM Hospitalist (on-call MD unless specified)              Discharge Details        Discharge Medications      New Medications      Instructions Start Date   cefdinir 300 MG capsule  Commonly known as: OMNICEF   300 mg, Oral, 2 Times Daily         Changes to Medications      Instructions Start Date   acetaminophen 325 MG tablet  Commonly known as: TYLENOL  What changed: Another medication with the same name was removed. Continue taking this medication, and follow the directions you see here.   650 mg, Oral, Every 8 Hours PRN         Continue These Medications      Instructions Start Date   amLODIPine 10 MG tablet  Commonly known as: NORVASC   10 mg, Oral, Nightly      atorvastatin 10 MG tablet  Commonly known as: LIPITOR   10 mg, Oral, Nightly      baclofen 10 MG tablet  Commonly known as: LIORESAL   15 mg, Oral, 3 Times Daily      bisacodyl 10 MG suppository  Commonly known as: DULCOLAX   10 mg, Rectal, Daily PRN      busPIRone 5 MG tablet  Commonly known as: BUSPAR   5 mg, Oral, 2 Times Daily      Diclofenac Sodium 1 % gel gel  Commonly known as: VOLTAREN   2 Times Daily, Right Knee/ Right Shoulder      digoxin 250 MCG tablet  Commonly known as: LANOXIN   250 mcg, Oral, Daily Digoxin, Hold For Pulse <60      divalproex 125 MG DR tablet  Commonly known as: DEPAKOTE   250 mg, Oral, 3 Times Daily      docusate sodium 100 MG capsule  Commonly known as: COLACE   200 mg, Oral, 2 Times Daily      Eliquis 5 MG tablet tablet  Generic drug: apixaban   5 mg, Oral, 2 Times Daily      fenofibrate 48 MG tablet  Commonly known as: TRICOR   48 mg, Oral, Daily      hydrALAZINE 50 MG tablet  Commonly known as: APRESOLINE   50 mg, Oral, 3 Times  Daily      HYDROcodone-acetaminophen 5-325 MG per tablet  Commonly known as: NORCO   1 tablet, Oral, 2 Times Daily      levETIRAcetam 500 MG tablet  Commonly known as: KEPPRA   500 mg, Oral, 2 Times Daily      Lidocaine 4 % patch   Apply externally, Daily, Right Shoulder      lisinopril 40 MG tablet  Commonly known as: PRINIVIL,ZESTRIL   40 mg, Oral, Daily      metoprolol tartrate 100 MG tablet  Commonly known as: LOPRESSOR   100 mg, Oral, Daily      Omega-3 1000 MG capsule   1 capsule, Oral, 2 times daily      phenytoin extended 200 MG ER capsule  Commonly known as: DILANTIN   200 mg, Oral, 2 Times Daily      polyethylene glycol 17 g packet  Commonly known as: MIRALAX   17 g, Oral, Daily      senna 8.6 MG tablet  Commonly known as: SENOKOT   2 tablets, Oral, 2 Times Daily      sertraline 50 MG tablet  Commonly known as: ZOLOFT   50 mg, Oral, Daily      Vitamin D3 50 MCG (2000 UT) tablet   Oral, Daily             No Known Allergies      Discharge Disposition:   Long Term Care (DC - External)    Diet:  Hospital:  Diet Order   Procedures   • Diet Texture; Mechanical Ground         Discharge Activity:   Activity Instructions     Other Activity Instructions      Activity Instructions: Fall precaution            CODE STATUS:  Code Status and Medical Interventions:   Ordered at: 09/08/22 2227     Level Of Support Discussed With:    Patient     Code Status (Patient has no pulse and is not breathing):    CPR (Attempt to Resuscitate)     Medical Interventions (Patient has pulse or is breathing):    Full Support         No future appointments.    Additional Instructions for the Follow-ups that You Need to Schedule     Discharge Follow-up with PCP   As directed       Currently Documented PCP:    Anna Barkley MD    PCP Phone Number:    [phone]     Follow Up Details: Follow-up with your PCP as needed         Discharge Follow-up with Specialty: Follow-up with urologist in 1 week   As directed      Specialty:  Follow-up with urologist in 1 week               Time spent on Discharge including face to face service:  33 minutes    This patient has been examined with appropriate PPE . 09/13/22      Signature: Electronically signed by Manuel Barreto MD, 09/13/22, 1:07 PM EDT.        Electronically signed by Manuel Barreto MD at 09/13/22 7079

## 2022-09-13 NOTE — PROGRESS NOTES
Urology Progress Note    Patient Identification:  Name:  Jeremiah Henson  Age:  71 y.o.  Sex:  female  :  1950  MRN:  [id number]    Chief Complaint: Patient feels better    History of Present Illness: Urine culture is growing Proteus which is sensitive to the Rocephin she is on.  Patient's Abbott catheter has been removed but she has not voided yet..    Problem List:  Patient Active Problem List   Diagnosis   • Atrial fibrillation (HCC)   • Cerebral infarction (HCC)   • Family history of lung cancer   • Hemiplegia of dominant side (HCC)   • Hyperlipidemia   • Hypertensive disorder   • Seizure disorder (HCC)   • Sleep apnea   • Abscess of back   • Chronic pain disorder   • Closed fracture of right hand   • Seasonal allergic rhinitis   • Urinary tract infectious disease   • Vitamin D deficiency   • Seizure disorder (HCC)   • Abdominal pain   • Anxiety   • Constipation   • Depressive disorder   • Mood disorder (HCC)   • Dislocation of shoulder joint   • Mechanical ileus (HCC)   • Left lower quadrant abdominal pain   • Altered mental status, unspecified altered mental status type     Past Medical History:  Past Medical History:   Diagnosis Date   • CVA (cerebral vascular accident) (HCC)    • Hyperlipidemia    • Hypertension    • Seizure (HCC)    • Sleep apnea      Past Surgical History:  Past Surgical History:   Procedure Laterality Date   • COLONOSCOPY N/A 2020    Procedure: COLONOSCOPY WITH POLYPECTOMY X 2;  Surgeon: Abelardo Garay MD;  Location: [facility] ENDOSCOPY;  Service: Gastroenterology;  Laterality: N/A;  COLON POLYPS   • HIP SURGERY     • PACEMAKER IMPLANTATION       Home Meds:  Medications Prior to Admission   Medication Sig Dispense Refill Last Dose   • acetaminophen (TYLENOL) 325 MG tablet Take 650 mg by mouth Every 8 (Eight) Hours As Needed for Mild Pain , Moderate Pain  or Fever.      • acetaminophen (Tylenol) 325 MG tablet Take 650 mg by mouth 2 (Two) Times a Day.      • amLODIPine  (NORVASC) 10 MG tablet Take 10 mg by mouth Every Night.      • atorvastatin (LIPITOR) 10 MG tablet Take 10 mg by mouth Every Night.      • baclofen (LIORESAL) 10 MG tablet Take 15 mg by mouth 3 (Three) Times a Day.      • bisacodyl (DULCOLAX) 10 MG suppository Insert 10 mg into the rectum Daily As Needed.      • busPIRone (BUSPAR) 5 MG tablet Take 5 mg by mouth 2 (Two) Times a Day.      • Cholecalciferol (Vitamin D3) 50 MCG (2000 UT) tablet Take  by mouth Daily.      • Diclofenac Sodium (VOLTAREN) 1 % gel gel 2 (Two) Times a Day. Right Knee/ Right Shoulder      • digoxin (LANOXIN) 250 MCG tablet Take 250 mcg by mouth Daily. Hold For Pulse <60      • divalproex (DEPAKOTE) 125 MG DR tablet Take 250 mg by mouth 3 (Three) Times a Day.      • docusate sodium (COLACE) 100 MG capsule Take 200 mg by mouth 2 (Two) Times a Day.      • Eliquis 5 MG tablet tablet Take 5 mg by mouth 2 (Two) Times a Day.      • fenofibrate (TRICOR) 48 MG tablet Take 48 mg by mouth Daily.      • hydrALAZINE (APRESOLINE) 50 MG tablet Take 50 mg by mouth 3 (Three) Times a Day.      • HYDROcodone-acetaminophen (NORCO) 5-325 MG per tablet Take 1 tablet by mouth 2 (Two) Times a Day.      • levETIRAcetam (KEPPRA) 500 MG tablet Take 500 mg by mouth 2 (Two) Times a Day.      • Lidocaine 4 % patch Apply  topically Daily. Right Shoulder      • lisinopril (PRINIVIL,ZESTRIL) 40 MG tablet Take 40 mg by mouth Daily.      • metoprolol tartrate (LOPRESSOR) 100 MG tablet Take 100 mg by mouth Daily.      • Omega-3 1000 MG capsule Take 1 capsule by mouth 2 (two) times a day.      • phenytoin extended (DILANTIN) 200 MG ER capsule Take 200 mg by mouth 2 (Two) Times a Day.      • polyethylene glycol (MIRALAX) 17 g packet Take 17 g by mouth Daily.      • senna (senna) 8.6 MG tablet Take 2 tablets by mouth 2 (Two) Times a Day.      • sertraline (ZOLOFT) 50 MG tablet Take 50 mg by mouth Daily.        Current Meds:    Current Facility-Administered Medications:   •   acetaminophen (TYLENOL) tablet 650 mg, 650 mg, Oral, Q4H PRN, 650 mg at 09/10/22 2021 **OR** acetaminophen (TYLENOL) 160 MG/5ML solution 650 mg, 650 mg, Oral, Q4H PRN **OR** acetaminophen (TYLENOL) suppository 650 mg, 650 mg, Rectal, Q4H PRN, Valarie Hermosillo MD  •  amLODIPine (NORVASC) tablet 10 mg, 10 mg, Oral, Nightly, Valarie Hermosillo MD, 10 mg at 09/12/22 2043  •  apixaban (ELIQUIS) tablet 5 mg, 5 mg, Oral, Q12H, Manuel Barreto MD, 5 mg at 09/12/22 2043  •  atorvastatin (LIPITOR) tablet 10 mg, 10 mg, Oral, Nightly, Valarie Hermosillo MD, 10 mg at 09/12/22 2043  •  bisacodyl (DULCOLAX) suppository 10 mg, 10 mg, Rectal, Daily PRN, Valarie Hermosillo MD  •  cefTRIAXone (ROCEPHIN) 2 g in sodium chloride 0.9 % 100 mL IVPB, 2 g, Intravenous, Q24H, Valarie Hermosillo MD, Last Rate: 200 mL/hr at 09/12/22 1359, 2 g at 09/12/22 1359  •  Divalproex Sodium (DEPAKOTE SPRINKLE) capsule 500 mg, 500 mg, Oral, Q12H, Lorena Bocanegra APRN, 500 mg at 09/12/22 2042  •  docusate sodium (COLACE) capsule 200 mg, 200 mg, Oral, BID, Valarie Hermosillo MD, 200 mg at 09/12/22 0849  •  hydrALAZINE (APRESOLINE) injection 10 mg, 10 mg, Intravenous, Q6H PRN, Valarie Hermosillo MD, 10 mg at 09/13/22 0624  •  hydrALAZINE (APRESOLINE) tablet 50 mg, 50 mg, Oral, TID, Valarie Hermosillo MD, 50 mg at 09/12/22 2043  •  levETIRAcetam (KEPPRA) tablet 750 mg, 750 mg, Oral, BID, Lorena Bocanegra APRN, 750 mg at 09/12/22 2042  •  lidocaine (LIDODERM) 5 % 1 patch, 1 patch, Transdermal, Q24H, Valarie Hermosillo MD  •  metoprolol tartrate (LOPRESSOR) tablet 50 mg, 50 mg, Oral, Daily, Valarie Hermosillo MD, 50 mg at 09/12/22 0849  •  ondansetron (ZOFRAN) tablet 4 mg, 4 mg, Oral, Q6H PRN **OR** ondansetron (ZOFRAN) injection 4 mg, 4 mg, Intravenous, Q6H PRN, Valarie Hermosillo MD  •  phenytoin ER (DILANTIN) capsule 200 mg, 200 mg, Oral, BID, Valarie Hermosillo MD, 200 mg at 09/12/22 2043  •  polyethylene glycol (MIRALAX) packet 17 g, 17 g, Oral, Daily, Valarie Hermosillo MD, 17 g at 09/12/22  "0849  •  sertraline (ZOLOFT) tablet 50 mg, 50 mg, Oral, Daily, Valarie Hermosillo MD, 50 mg at 22 0849  •  sodium chloride 0.9 % flush 10 mL, 10 mL, Intravenous, PRN, Valarie Hermosillo MD, 10 mL at 22 1323  •  sodium chloride 0.9 % flush 10 mL, 10 mL, Intravenous, Q12H, Valarie Hermosillo MD, 10 mL at 223  •  sodium chloride 0.9 % flush 10 mL, 10 mL, Intravenous, PRN, Valarie Hermosillo MD  Allergies:  Patient has no known allergies.    Review of Systems   no fevers or chills.  No congestion or nasal discharge.    Objective:  tMax 24 hours:  Temp (24hrs), Av.6 °F (36.4 °C), Min:97.4 °F (36.3 °C), Max:97.8 °F (36.6 °C)    Vital Sign Ranges:  Temp:  [97.4 °F (36.3 °C)-97.8 °F (36.6 °C)] 97.5 °F (36.4 °C)  Heart Rate:  [76-92] 92  Resp:  [16-19] 19  BP: (148-199)/(69-80) 199/80  Intake and Output Last 3 Shifts:  I/O last 3 completed shifts:  In: 980 [P.O.:880; IV Piggyback:100]  Out: 900 [Urine:900]    Exam:  BP (!) 199/80 (BP Location: Left arm, Patient Position: Lying) Comment: LPN notified  Pulse 92   Temp 97.5 °F (36.4 °C) (Axillary)   Resp 19   Ht 165.1 cm (65\")   Wt 83 kg (182 lb 15.7 oz)   SpO2 99%   BMI 30.45 kg/m²    General Appearance:    Alert, cooperative, no acute distress, general         appearance is normal   Head:    Normocephalic, without obvious abnormality, atraumatic   Eyes:            Pupils/Irises normal. Exterior inspection conjunctivae       and lids normal.   Ears:    Normal external inspection   Nose:   Exterior inspection of nose is normal   Throat:   Lips, mucosa, and tongue normal   Lungs:     Respirations unlabored; normal effort, no audible     abnormality   CV:   Regular rhythm and normal rate, no edema   Abdomen:     examination of the abdomen is normal with     no masses, tenderness, or distension    :       Data Review:  All labs (24hrs):    Lab Results (last 24 hours)     Procedure Component Value Units Date/Time    Basic Metabolic Panel [[id number]]  (Abnormal) " Collected: 09/12/22 2329    Specimen: Blood Updated: 09/13/22 0113     Glucose 106 mg/dL      BUN 15 mg/dL      Creatinine 0.44 mg/dL      Sodium 137 mmol/L      Potassium 3.6 mmol/L      Comment: Slight hemolysis detected by analyzer. Results may be affected.        Chloride 99 mmol/L      CO2 27.0 mmol/L      Calcium 8.0 mg/dL      BUN/Creatinine Ratio 34.1     Anion Gap 11.0 mmol/L      eGFR 102.3 mL/min/1.73      Comment: National Kidney Foundation and American Society of Nephrology (ASN) Task Force recommended calculation based on the Chronic Kidney Disease Epidemiology Collaboration (CKD-EPI) equation refit without adjustment for race.       Narrative:      GFR Normal >60  Chronic Kidney Disease <60  Kidney Failure <15      CBC & Differential [[id number]]  (Abnormal) Collected: 09/12/22 2329    Specimen: Blood Updated: 09/13/22 0059    Narrative:      The following orders were created for panel order CBC & Differential.  Procedure                               Abnormality         Status                     ---------                               -----------         ------                     CBC Auto Differential[[id number]]        Abnormal            Final result                 Please view results for these tests on the individual orders.    CBC Auto Differential [[id number]]  (Abnormal) Collected: 09/12/22 2329    Specimen: Blood Updated: 09/13/22 0059     WBC 3.70 10*3/mm3      RBC 3.30 10*6/mm3      Hemoglobin 10.8 g/dL      Hematocrit 31.7 %      MCV 96.1 fL      MCH 32.7 pg      MCHC 34.1 g/dL      RDW 14.0 %      RDW-SD 47.3 fl      MPV 9.0 fL      Platelets 159 10*3/mm3      Neutrophil % 57.5 %      Lymphocyte % 30.6 %      Monocyte % 11.0 %      Eosinophil % 0.6 %      Basophil % 0.3 %      Neutrophils, Absolute 2.10 10*3/mm3      Lymphocytes, Absolute 1.10 10*3/mm3      Monocytes, Absolute 0.40 10*3/mm3      Eosinophils, Absolute 0.00 10*3/mm3      Basophils, Absolute 0.00 10*3/mm3      nRBC 0.2 /100  WBC     Blood Culture - Blood, Hand, Left [[id number]]  (Normal) Collected: 09/08/22 1318    Specimen: Blood from Hand, Left Updated: 09/12/22 1347     Blood Culture No growth at 4 days    Blood Culture - Blood, Hand, Left [[id number]]  (Normal) Collected: 09/10/22 1035    Specimen: Blood from Hand, Left Updated: 09/12/22 1100     Blood Culture No growth at 2 days        Radiology:   Imaging Results (Last 72 Hours)     ** No results found for the last 72 hours. **          Assessment/Plan:    Active Problems:    Altered mental status, unspecified altered mental status type     Proteus UTI for which she is on the correct antibiotic  Urinary retention    Plan  Voiding trial today      Martin Barboza MD  9/13/2022  07:23 EDT

## 2022-09-13 NOTE — PHARMACY RECOMMENDATION
[facility]-Care ([facility]) Pharmacy Future COST Assessment:     /Nurse requesting test claim: N/A - this patient is on the high-cost drug report list    Pharmacy ran test claim for the following:     Drug Sig Covered/PA required Patient Copay per month   Eliquis (apixiban) 5 mg BID   N/A - patient discharging to facility/LTC/etc. $ N/A - patient discharging to facility/LTC/etc.     Patient Insurance Type: N/A - patient discharging to facility/LTC/etc.    Deductible/Medicare Gap Issue? N/A (discharging to facility/LTC/etc)    Is patient signed up for M2B service? No    Is above drug(s) eligible for 1 month free through M2B service? N/A - patient discharging to facility/LTC/etc.  If eligible,  or [facility] pharmacy can provide coupon upon request.           For billing questions, reach out to [facility] at [phone]  For M2B questions, reach out to [facility] at [phone]    Caren Junior PharmD   9/12/2022 21:09 EDT

## 2022-09-13 NOTE — NURSING NOTE
WOCN note:    71 yr old female admitted 9/8/22 from her ECF with worsening confusion. WOCN consult received for sacral pressure injury noted upon admission.     Patient presents with newly epithelialized areas to her sacrum. She is incontinent of urine and currently has a herminio-wick in place as well as a low air loss pump and zinc based moisture barrier.     Recommend to continue current pressure injury prevention measures and skin care for any episodes of incontinence. We will continue to follow as needed.

## 2022-09-13 NOTE — DISCHARGE SUMMARY
[facility] Medicine Services  DISCHARGE SUMMARY    Patient Name: Jeremiah Henson  : 1950  MRN: [id number]    Date of Admission: 2022  Discharge Diagnosis:     Acute toxic metabolic encephalopathy  Multiple comorbidities-hypernatremia versus acute kidney injury versus UTI  Mental status back to baseline     Proteus mirabilis UTI  Pan susceptibility  On Rocephin while inpatient  Discharging on Omnicef to complete antibiotics course       Staph coagulase-negative bacteremia  1 out of 2 bottles  Most likely contaminants     Acute kidney injury  Most likely prerenal  Resolved     Seizure disorder  Continue seizure precaution  On divalproex , Keppra and phenytoin  Monitor     Chronic atrial fibrillation  Rate controlled  On metoprolol and on Eliquis     Hyperlipidemia-on statin     Urinary retention  Probably due to UTI/decreased mobility due to illness  Urologist following  Voiding trial done on 2022  If unsuccessful, Abbott catheter will be placed and patient discharged to follow-up with  urologist as outpatient     Hypertension  Chronic and essential  Blood pressure fairly controlled  On hydralazine, metoprolol and Norvasc     Hypernatremia  Probably due to dehydration  Improved     Depression-on Zoloft          Date of Discharge: 2022  Primary Care Physician: Anna Barkley MD      Presenting Problem:   MEGAN (acute kidney injury) (HCC) [N17.9]  Acute cystitis with hematuria [N30.01]  Elevated digoxin level [R78.89]  On valproic acid therapy [Z79.899]  Altered mental status, unspecified altered mental status type [R41.82]  Elevated phenytoin level [R78.89]    Active and Resolved Hospital Problems:  Active Hospital Problems    Diagnosis POA   • Altered mental status, unspecified altered mental status type [R41.82] Yes      Resolved Hospital Problems   No resolved problems to display.         Hospital Course     Hospital Course:  71-year-old female with multiple  comorbidities including hypertension, atrial fibrillation on chronic anticoagulation with Eliquis and seizure disorder.  She is a resident of a local [facility].  Was sent to the emergency room due to altered mental status.     Conditions addressed while inpatient are as stated below    Acute toxic metabolic encephalopathy  Multiple comorbidities-hypernatremia versus acute kidney injury versus UTI  Mental status back to baseline     Proteus mirabilis UTI  Pan susceptibility  On Rocephin while inpatient  Discharging on Omnicef to complete antibiotics course       Staph coagulase-negative bacteremia  1 out of 2 bottles  Most likely contaminants     Acute kidney injury  Most likely prerenal  Resolved     Seizure disorder  Continue seizure precaution  On divalproex , Keppra and phenytoin  Monitor     Chronic atrial fibrillation  Rate controlled  On metoprolol and on Eliquis     Hyperlipidemia-on statin     Urinary retention  Probably due to UTI/decreased mobility due to illness  Urologist following  Voiding trial done on 9/13/2022  If unsuccessful, Abbott catheter will be placed and patient discharged to follow-up with  urologist as outpatient     Hypertension  Chronic and essential  Blood pressure fairly controlled  On hydralazine, metoprolol and Norvasc     Hypernatremia  Probably due to dehydration  Improved     Depression-on Zoloft      Condition at discharge-stable    DISCHARGE Follow Up Recommendations for labs and diagnostics:       Reasons For Change In Medications and Indications for New Medications:      Day of Discharge     Vital Signs:  Temp:  [97.4 °F (36.3 °C)-97.7 °F (36.5 °C)] 97.7 °F (36.5 °C)  Heart Rate:  [] 107  Resp:  [18-20] 20  BP: (157-199)/(69-80) 163/77    Physical Exam:  Physical Exam  Vitals reviewed.   Constitutional:       General: She is not in acute distress.  HENT:      Head: Normocephalic.      Nose: Nose normal.      Mouth/Throat:      Mouth: Mucous membranes are moist.   Eyes:       Extraocular Movements: Extraocular movements intact.      Conjunctiva/sclera: Conjunctivae normal.      Pupils: Pupils are equal, round, and reactive to light.   Cardiovascular:      Rate and Rhythm: Normal rate. Rhythm irregular.   Pulmonary:      Effort: No respiratory distress.   Abdominal:      General: Bowel sounds are normal. There is no distension.      Palpations: Abdomen is soft.      Tenderness: There is no abdominal tenderness.   Musculoskeletal:      Cervical back: Neck supple.      Comments: Degenerative changes   Skin:     General: Skin is warm.   Neurological:      Mental Status: She is alert. Mental status is at baseline.   Psychiatric:         Mood and Affect: Mood normal.            Pertinent  and/or Most Recent Results     LAB RESULTS:      Lab 09/12/22 2329 09/12/22 0254 09/10/22  2257 09/10/22  1035 09/09/22  2016 09/09/22  0518 09/08/22  1307 09/08/22  1200   WBC 3.70 3.50 3.10* 3.60  --  3.80  --  3.60   HEMOGLOBIN 10.8* 10.8* 11.0* 11.1*  --  11.5*  --  11.8*   HEMATOCRIT 31.7* 32.0* 33.2* 33.1*  --  34.5  --  35.8   PLATELETS 159 156 190 206  --  254  --  268   NEUTROS ABS 2.10 1.60* 1.60* 1.60*  --  2.10  --  1.70   LYMPHS ABS 1.10 1.40 1.10 1.50  --  1.30  --  1.50   MONOS ABS 0.40 0.30 0.30 0.50  --  0.40  --  0.40   EOS ABS 0.00 0.00 0.00 0.00  --  0.00  --  0.00   MCV 96.1 97.3* 97.3* 98.3*  --  98.8*  --  101.2*   PROCALCITONIN  --   --   --   --   --   --   --  0.12   LACTATE  --   --   --  0.9 2.5*  --  0.8  --          Lab 09/12/22 2329 09/12/22  0254 09/11/22  0030 09/09/22  0518 09/08/22  1200   SODIUM 137 139 140 147* 146*   POTASSIUM 3.6 3.6 3.7 3.9 4.5   CHLORIDE 99 101 103 107 104   CO2 27.0 29.0 28.0 23.0 24.0   ANION GAP 11.0 9.0 9.0 17.0* 18.0*   BUN 15 24* 31* 51* 57*   CREATININE 0.44* 0.52* 0.66 1.14* 1.48*   EGFR 102.3 98.2 92.8 50.9* 37.2*   GLUCOSE 106* 110* 129* 142* 114*   CALCIUM 8.0* 8.0* 8.5* 9.0 9.5   TSH  --   --  1.110  --   --          Lab 09/08/22  1200    TOTAL PROTEIN 7.0   ALBUMIN 3.80   GLOBULIN 3.2   ALT (SGPT) 8   AST (SGOT) 14   BILIRUBIN 0.2   ALK PHOS 36*             Lab 09/11/22  0030   CHOLESTEROL 174   LDL CHOL 92   HDL CHOL 53   TRIGLYCERIDES 171*         Lab 09/11/22  0030   VITAMIN B 12 948*         Brief Urine Lab Results  (Last result in the past 365 days)      Color   Clarity   Blood   Leuk Est   Nitrite   Protein   CREAT   Urine HCG        09/08/22 1203 Dark Yellow   Turbid   Small (1+)   Large (3+)   Negative   >=300 mg/dL (3+)               Microbiology Results (last 10 days)     Procedure Component Value - Date/Time    Blood Culture - Blood, Hand, Left [[id number]]  (Normal) Collected: 09/10/22 1035    Lab Status: Preliminary result Specimen: Blood from Hand, Left Updated: 09/13/22 1102     Blood Culture No growth at 3 days    Blood Culture - Blood, Arm, Right [[id number]]  (Abnormal) Collected: 09/08/22 1319    Lab Status: Final result Specimen: Blood from Arm, Right Updated: 09/10/22 0804     Blood Culture Staphylococcus, coagulase negative     Isolated from Aerobic Bottle     Gram Stain Aerobic Bottle Gram positive cocci in clusters    Narrative:      Probable contaminant requires clinical correlation, susceptibility not performed unless requested by physician.        Blood Culture ID, PCR - Blood, Arm, Right [[id number]]  (Abnormal) Collected: 09/08/22 1319    Lab Status: Final result Specimen: Blood from Arm, Right Updated: 09/09/22 1422     BCID, PCR Staph spp, not aureus or lugdunesis. Identification by BCID2 PCR.     BOTTLE TYPE Aerobic Bottle    Blood Culture - Blood, Hand, Left [[id number]]  (Normal) Collected: 09/08/22 1318    Lab Status: Preliminary result Specimen: Blood from Hand, Left Updated: 09/12/22 1347     Blood Culture No growth at 4 days    Urine Culture - Urine, Urine, Catheter [[id number]]  (Abnormal)  (Susceptibility) Collected: 09/08/22 1203    Lab Status: Final result Specimen: Urine, Catheter Updated: 09/11/22 1342      Urine Culture >100,000 CFU/mL Proteus mirabilis    Narrative:      Colonization of the urinary tract without infection is common. Treatment is discouraged unless the patient is symptomatic, pregnant, or undergoing an invasive urologic procedure.    Susceptibility      Proteus mirabilis      VICTOR MANUEL      Ampicillin Susceptible      Ampicillin + Sulbactam Susceptible      Cefazolin Susceptible      Cefepime Susceptible      Ceftazidime Susceptible      Ceftriaxone Susceptible      Gentamicin Susceptible      Levofloxacin Susceptible      Nitrofurantoin Resistant     Piperacillin + Tazobactam Susceptible      Trimethoprim + Sulfamethoxazole Resistant                                CT Head Without Contrast    Result Date: 9/8/2022  Impression: No evidence of hemorrhage, mass effect or midline shift. No acute process identified. Stable chronic findings as above.  Electronically Signed By-Huong Dwyer MD On:9/8/2022 2:06 PM This report was finalized on [id number] by  Huong Dwyer MD.    XR Chest 1 View    Result Date: 9/8/2022  Impression:  1. No acute cardiopulmonary findings.  2. Anterior inferior subluxation or dislocation of the right shoulder. This has a similar appearance to the chest x-ray from 10/5/2017. Correlate with physical exam findings. 3. Stable borderline cardiac enlargement. 4. Old right rib fractures.  Electronically Signed By-Taty Mack MD On:9/8/2022 12:55 PM This report was finalized on [id number] by  Taty Mack MD.                  Labs Pending at Discharge:  Pending Labs     Order Current Status    Levetiracetam Level (Keppra) In process    Blood Culture - Blood, Hand, Left Preliminary result    Blood Culture - Blood, Hand, Left Preliminary result          Procedures Performed           Consults:   Consults     Date and Time Order Name Status Description    9/11/2022  4:45 AM Inpatient Urology Consult Completed     9/10/2022  2:59 PM Inpatient Neurology Consult General Completed      9/8/2022  2:18 PM Hospitalist (on-call MD unless specified)              Discharge Details        Discharge Medications      New Medications      Instructions Start Date   cefdinir 300 MG capsule  Commonly known as: OMNICEF   300 mg, Oral, 2 Times Daily         Changes to Medications      Instructions Start Date   acetaminophen 325 MG tablet  Commonly known as: TYLENOL  What changed: Another medication with the same name was removed. Continue taking this medication, and follow the directions you see here.   650 mg, Oral, Every 8 Hours PRN         Continue These Medications      Instructions Start Date   amLODIPine 10 MG tablet  Commonly known as: NORVASC   10 mg, Oral, Nightly      atorvastatin 10 MG tablet  Commonly known as: LIPITOR   10 mg, Oral, Nightly      baclofen 10 MG tablet  Commonly known as: LIORESAL   15 mg, Oral, 3 Times Daily      bisacodyl 10 MG suppository  Commonly known as: DULCOLAX   10 mg, Rectal, Daily PRN      busPIRone 5 MG tablet  Commonly known as: BUSPAR   5 mg, Oral, 2 Times Daily      Diclofenac Sodium 1 % gel gel  Commonly known as: VOLTAREN   2 Times Daily, Right Knee/ Right Shoulder      digoxin 250 MCG tablet  Commonly known as: LANOXIN   250 mcg, Oral, Daily Digoxin, Hold For Pulse <60      divalproex 125 MG DR tablet  Commonly known as: DEPAKOTE   250 mg, Oral, 3 Times Daily      docusate sodium 100 MG capsule  Commonly known as: COLACE   200 mg, Oral, 2 Times Daily      Eliquis 5 MG tablet tablet  Generic drug: apixaban   5 mg, Oral, 2 Times Daily      fenofibrate 48 MG tablet  Commonly known as: TRICOR   48 mg, Oral, Daily      hydrALAZINE 50 MG tablet  Commonly known as: APRESOLINE   50 mg, Oral, 3 Times Daily      HYDROcodone-acetaminophen 5-325 MG per tablet  Commonly known as: NORCO   1 tablet, Oral, 2 Times Daily      levETIRAcetam 500 MG tablet  Commonly known as: KEPPRA   500 mg, Oral, 2 Times Daily      Lidocaine 4 % patch   Apply externally, Daily, Right Shoulder       lisinopril 40 MG tablet  Commonly known as: PRINIVIL,ZESTRIL   40 mg, Oral, Daily      metoprolol tartrate 100 MG tablet  Commonly known as: LOPRESSOR   100 mg, Oral, Daily      Omega-3 1000 MG capsule   1 capsule, Oral, 2 times daily      phenytoin extended 200 MG ER capsule  Commonly known as: DILANTIN   200 mg, Oral, 2 Times Daily      polyethylene glycol 17 g packet  Commonly known as: MIRALAX   17 g, Oral, Daily      senna 8.6 MG tablet  Commonly known as: SENOKOT   2 tablets, Oral, 2 Times Daily      sertraline 50 MG tablet  Commonly known as: ZOLOFT   50 mg, Oral, Daily      Vitamin D3 50 MCG (2000 UT) tablet   Oral, Daily             No Known Allergies      Discharge Disposition:   Long Term Care (DC - External)    Diet:  Hospital:  Diet Order   Procedures   • Diet Texture; Mechanical Ground         Discharge Activity:   Activity Instructions     Other Activity Instructions      Activity Instructions: Fall precaution            CODE STATUS:  Code Status and Medical Interventions:   Ordered at: 09/08/22 2227     Level Of Support Discussed With:    Patient     Code Status (Patient has no pulse and is not breathing):    CPR (Attempt to Resuscitate)     Medical Interventions (Patient has pulse or is breathing):    Full Support         No future appointments.    Additional Instructions for the Follow-ups that You Need to Schedule     Discharge Follow-up with PCP   As directed       Currently Documented PCP:    Anna Barkley MD    PCP Phone Number:    [phone]     Follow Up Details: Follow-up with your PCP as needed         Discharge Follow-up with Specialty: Follow-up with urologist in 1 week   As directed      Specialty: Follow-up with urologist in 1 week               Time spent on Discharge including face to face service:  33 minutes    This patient has been examined with appropriate PPE . 09/13/22      Signature: Electronically signed by Manuel Barreto MD, 09/13/22, 1:07 PM EDT.

## 2022-09-13 NOTE — THERAPY TREATMENT NOTE
Acute Care - Speech Language Pathology   Swallow Treatment Note  [place]     Patient Name: Jeremiah Henson  : 1950  MRN: [id number]  Today's Date: 2022               Admit Date: 2022  Patient was not wearing a face mask during this therapy encounter. Therapist used appropriate personal protective equipment including mask, eye protection and gloves.  Mask used was standard procedure mask. Appropriate PPE was worn during the entire therapy session. Hand hygiene was completed before and after therapy session. Patient is not in enhanced droplet precautions.             Visit Dx:     ICD-10-CM ICD-9-CM   1. Altered mental status, unspecified altered mental status type  R41.82 780.97   2. Acute cystitis with hematuria  N30.01 595.0   3. Elevated digoxin level  R78.89 796.0   4. On valproic acid therapy  Z79.899 V58.69   5. Elevated phenytoin level  R78.89 790.6   6. MEGAN (acute kidney injury) (HCC)  N17.9 584.9     Patient Active Problem List   Diagnosis   • Atrial fibrillation (HCC)   • Cerebral infarction (HCC)   • Family history of lung cancer   • Hemiplegia of dominant side (HCC)   • Hyperlipidemia   • Hypertensive disorder   • Seizure disorder (HCC)   • Sleep apnea   • Abscess of back   • Chronic pain disorder   • Closed fracture of right hand   • Seasonal allergic rhinitis   • Urinary tract infectious disease   • Vitamin D deficiency   • Seizure disorder (HCC)   • Abdominal pain   • Anxiety   • Constipation   • Depressive disorder   • Mood disorder (HCC)   • Dislocation of shoulder joint   • Mechanical ileus (HCC)   • Left lower quadrant abdominal pain   • Altered mental status, unspecified altered mental status type     Past Medical History:   Diagnosis Date   • CVA (cerebral vascular accident) (HCC)    • Hyperlipidemia    • Hypertension    • Seizure (HCC)    • Sleep apnea      Past Surgical History:   Procedure Laterality Date   • COLONOSCOPY N/A 2020    Procedure: COLONOSCOPY WITH  POLYPECTOMY X 2;  Surgeon: Abelardo Garay MD;  Location: [facility];  Service: Gastroenterology;  Laterality: N/A;  COLON POLYPS   • HIP SURGERY     • PACEMAKER IMPLANTATION         SLP Recommendation and Plan          SWALLOW EVALUATION (last 72 hours)           EDUCATION  The patient has been educated in the following areas:   Dysphagia (Swallowing Impairment) Oral Care/Hydration.        SLP GOALS     Row Name 09/13/22 1300          (STG) Swallow 1 The patient will participate in a full meal assessment to determine safety and adequacy of recommended diet, independent use of safe swallow compensations, and additional goals/recommendations to follow  -CB    [place] (Swallow Short Term Goal 1) with moderate cues (50-74% accuracy)  -CB    Time Frame (Swallow Short Term Goal 1) other (see comments)  -CB    Progress/Outcomes (Swallow Short Term Goal 1) goal ongoing  -CB    Comment (Swallow Short Term Goal 1) Patient was seen for dysphagia meal. Positioned patient upright in bed prior to meal.  Patient is visually impaired and requires assistance with feeding. Patient is edentulous. Patient reports that she had upper and lower dentures but does not generally wear them when she eats. Patient currently receives mechanical ground  which is felt to be her least restrictive diet. Patient noted to demonstrate adequate munching/chewing pattern. Patient exhibits trace oral residue following the swallow at times. Patient requires multiple swallows and/or liquid wash to adequately clear oral cavity effectively. No wet vocal quality was detected. No s/s of aspiration or complications were observed. Patient demonstrates adequate labial seal around the spoon as no anterior loss is evident.  Patient took sips of thins via straw without s/s of aspiration or complications. Vocal quality was clear. ST will continue to follow to assure safety and tolerance with least restrictive diet.  -CB    (LTG) Swallow The patient  will maximize swallow function for least restrictive po diet, exhibiting no complications associated with dysphagia, adequate po intake, and demonstrating independent use of safe swallow compensations.  -CB    [place] (Swallow Long Term Goal) with moderate cues (50-74% accuracy)  -CB    Time Frame (Swallow Long Term Goal) by discharge  -CB              (STG) Swallow 2 The patient will participate in ongoing assessment of swallow to determine need for further swallow re-evaluation and appropriateness/need for an instrumental assessment of swallow  -CB    [place] (Swallow Short Term Goal 2) with moderate cues (50-74% accuracy)  -CB    Time Frame (Swallow Short Term Goal 2) 1 week  -CB          User Key  (r) = Recorded By, (t) = Taken By, (c) = Cosigned By    Initials Name Provider Type          Arleen Salgado, SLP Speech and Language Pathologist                   Time Calculation:                EUGENIA Art  9/13/2022

## 2022-09-13 NOTE — PLAN OF CARE
Goal Outcome Evaluation:  Plan of Care Reviewed With: patient        Progress: no change  Outcome Evaluation: Pt discharging back to [facility] with tolentino cather per Dr. Barboza (urology), abx changed to PO omnicef.

## 2022-09-13 NOTE — PLAN OF CARE
Goal Outcome Evaluation:  Plan of Care Reviewed With: patient        Progress: no change   Patient experiencing hallucinations throughout shift, difficulty falling asleep. Pt will have voiding trial today, orders for tolentino cath to be d/c are placed. Call light in reach, will continue to monitor..

## 2022-09-15 LAB
BACTERIA SPEC AEROBE CULT: NORMAL
LEVETIRACETAM SERPL-MCNC: 8.9 UG/ML (ref 10–40)

## (undated) DEVICE — TRAP WIDEEYE POLYP

## (undated) DEVICE — SNAR POLYP CAPTIVATOR OVL 13MM 240CM

## (undated) DEVICE — PK ENDO GI 50

## (undated) DEVICE — PAPR PRNT PK SONY W RIBN UPC55